# Patient Record
Sex: FEMALE | Race: WHITE | Employment: OTHER | URBAN - METROPOLITAN AREA
[De-identification: names, ages, dates, MRNs, and addresses within clinical notes are randomized per-mention and may not be internally consistent; named-entity substitution may affect disease eponyms.]

---

## 2017-06-13 ENCOUNTER — HOSPITAL ENCOUNTER (OUTPATIENT)
Dept: LAB | Age: 77
Discharge: HOME | End: 2017-06-13
Payer: MEDICARE

## 2017-06-13 ENCOUNTER — OFFICE VISIT (OUTPATIENT)
Dept: HEMATOLOGY | Age: 77
End: 2017-06-13

## 2017-06-13 VITALS
TEMPERATURE: 96.8 F | OXYGEN SATURATION: 97 % | RESPIRATION RATE: 16 BRPM | HEIGHT: 62 IN | HEART RATE: 65 BPM | SYSTOLIC BLOOD PRESSURE: 141 MMHG | BODY MASS INDEX: 29.55 KG/M2 | WEIGHT: 160.6 LBS | DIASTOLIC BLOOD PRESSURE: 65 MMHG

## 2017-06-13 DIAGNOSIS — K74.60 CIRRHOSIS OF LIVER WITHOUT ASCITES, UNSPECIFIED HEPATIC CIRRHOSIS TYPE (HCC): ICD-10-CM

## 2017-06-13 DIAGNOSIS — K75.81 NASH (NONALCOHOLIC STEATOHEPATITIS): ICD-10-CM

## 2017-06-13 DIAGNOSIS — K74.60 CIRRHOSIS OF LIVER WITHOUT ASCITES, UNSPECIFIED HEPATIC CIRRHOSIS TYPE (HCC): Primary | ICD-10-CM

## 2017-06-13 LAB
ALBUMIN SERPL BCP-MCNC: 4.2 G/DL (ref 3.4–5)
ALBUMIN/GLOB SERPL: 1.3 {RATIO} (ref 0.8–1.7)
ALP SERPL-CCNC: 44 U/L (ref 45–117)
ALT SERPL-CCNC: 39 U/L (ref 13–56)
ANION GAP BLD CALC-SCNC: 10 MMOL/L (ref 3–18)
AST SERPL W P-5'-P-CCNC: 49 U/L (ref 15–37)
BASOPHILS # BLD AUTO: 0 K/UL (ref 0–0.06)
BASOPHILS # BLD: 0 % (ref 0–2)
BILIRUB DIRECT SERPL-MCNC: 0.1 MG/DL (ref 0–0.2)
BILIRUB SERPL-MCNC: 0.4 MG/DL (ref 0.2–1)
BUN SERPL-MCNC: 29 MG/DL (ref 7–18)
BUN/CREAT SERPL: 33 (ref 12–20)
CALCIUM SERPL-MCNC: 9.6 MG/DL (ref 8.5–10.1)
CHLORIDE SERPL-SCNC: 104 MMOL/L (ref 100–108)
CO2 SERPL-SCNC: 29 MMOL/L (ref 21–32)
CREAT SERPL-MCNC: 0.89 MG/DL (ref 0.6–1.3)
DIFFERENTIAL METHOD BLD: ABNORMAL
EOSINOPHIL # BLD: 0.1 K/UL (ref 0–0.4)
EOSINOPHIL NFR BLD: 2 % (ref 0–5)
ERYTHROCYTE [DISTWIDTH] IN BLOOD BY AUTOMATED COUNT: 15.5 % (ref 11.6–14.5)
GLOBULIN SER CALC-MCNC: 3.2 G/DL (ref 2–4)
GLUCOSE SERPL-MCNC: 166 MG/DL (ref 74–99)
HCT VFR BLD AUTO: 41.6 % (ref 35–45)
HGB BLD-MCNC: 13.8 G/DL (ref 12–16)
LYMPHOCYTES # BLD AUTO: 28 % (ref 21–52)
LYMPHOCYTES # BLD: 1.1 K/UL (ref 0.9–3.6)
MCH RBC QN AUTO: 30.4 PG (ref 24–34)
MCHC RBC AUTO-ENTMCNC: 33.2 G/DL (ref 31–37)
MCV RBC AUTO: 91.6 FL (ref 74–97)
MONOCYTES # BLD: 0.3 K/UL (ref 0.05–1.2)
MONOCYTES NFR BLD AUTO: 7 % (ref 3–10)
NEUTS SEG # BLD: 2.5 K/UL (ref 1.8–8)
NEUTS SEG NFR BLD AUTO: 63 % (ref 40–73)
PLATELET # BLD AUTO: 108 K/UL (ref 135–420)
PMV BLD AUTO: 12.5 FL (ref 9.2–11.8)
POTASSIUM SERPL-SCNC: 4.3 MMOL/L (ref 3.5–5.5)
PROT SERPL-MCNC: 7.4 G/DL (ref 6.4–8.2)
RBC # BLD AUTO: 4.54 M/UL (ref 4.2–5.3)
SODIUM SERPL-SCNC: 143 MMOL/L (ref 136–145)
WBC # BLD AUTO: 4 K/UL (ref 4.6–13.2)

## 2017-06-13 PROCEDURE — 36415 COLL VENOUS BLD VENIPUNCTURE: CPT | Performed by: NURSE PRACTITIONER

## 2017-06-13 PROCEDURE — 82107 ALPHA-FETOPROTEIN L3: CPT | Performed by: NURSE PRACTITIONER

## 2017-06-13 PROCEDURE — 80076 HEPATIC FUNCTION PANEL: CPT | Performed by: NURSE PRACTITIONER

## 2017-06-13 PROCEDURE — 80048 BASIC METABOLIC PNL TOTAL CA: CPT | Performed by: NURSE PRACTITIONER

## 2017-06-13 PROCEDURE — 85025 COMPLETE CBC W/AUTO DIFF WBC: CPT | Performed by: NURSE PRACTITIONER

## 2017-06-13 RX ORDER — ATORVASTATIN CALCIUM 10 MG/1
TABLET, FILM COATED ORAL DAILY
COMMUNITY

## 2017-06-13 NOTE — PROGRESS NOTES
2 Alie Carlisle MD, GLENN Sorenson PA-C Emi Arbour, MD, FACP, [facility]  Melida Flores NP        at [address], [address] [address]  [address].     [phone]     FAX: [phone]    at [address], [address] [place] [address], [address]     [phone]     FAX: [phone]           Patient Care Team:  Geeta Adair MD as PCP - General (Family Practice)  Ana Blackman MD (Gastroenterology)      Problem List  Date Reviewed: 6/13/2017          Codes Class Noted    MÉNDEZ (nonalcoholic steatohepatitis) ICD-10-CM: K75.81  ICD-9-CM: 571.8  11/29/2014        Diabetes mellitus ([address] ICD-10-CM: E11.9  ICD-9-CM: 250.00  11/5/2013        Gout ICD-10-CM: M10.9  ICD-9-CM: 274.9  11/5/2013        GERD (gastroesophageal reflux disease) ICD-10-CM: K21.9  ICD-9-CM: 530.81  11/5/2013        Hypothyroidism ICD-10-CM: E03.9  ICD-9-CM: 244.9  11/5/2013        Hypertension ICD-10-CM: I10  ICD-9-CM: 401.9  11/5/2013        S/P cholecystectomy ICD-10-CM: Z90.49  ICD-9-CM: V45.79  11/5/2013        S/P EULALIA (total abdominal hysterectomy) ICD-10-CM: Z90.710  ICD-9-CM: V88.01  11/5/2013              Marya Ahuja returns to the [place] [address] for monitoring of MÉNDEZ. The active problem list, all pertinent past medical history, medications, liver histology, endoscopic studies, radiologic findings and laboratory findings related to the liver disorder were reviewed with the patient. Serologic evaluation was positive for JAEL and ASMA. A liver biopsy performed in 2005 suggested MÉNDEZ with fibrosis. She had been on prednisone for macrobid induced pulmonary fibrosis. While on prednisone the liver enzymes normalized. When off prednisone the liver enzymes increased. She had a very high titer positive JAEL and ASMA.   The possibility she had AIH and not MÉNDEZ was considered. Repeat liver biopsy in 8/2014 demonstrates MÉNDEZ with bridging fibrosis. The patient feels well and voiced no complaints today. She is helping to care for her grandchildren. She has been walking 4 to 6 miles a day and has lost 25 pounds over the past year or so. The patient has no complaints which can be attributed to liver disease. The patient completes all daily activities without any functional limitations. The patient has not experienced fatigue, fevers, chills, shortness of breath, chest pain, pain in the right side over the liver, diffuse abdominal pain, nausea, vomiting, constipation, diarrhrea, dry eyes, dry mouth, arthralgias, myalgias, yellowing of the eyes or skin, itching, dark urine, problems concentrating, swelling of the abdomen, swelling of the lower extremities, hematemesis, or hematochezia. Allergies   Allergen Reactions    Ciprofloxacin Itching     \"scalp burns and body itches\"    Macrobid [Nitrofurantoin Monohyd/M-Cryst] Other (comments)     Scarred lungs    Sulfa (Sulfonamide Antibiotics) Hives    Tetanus Immune Globulin Swelling     Current Outpatient Prescriptions   Medication Sig    atorvastatin (LIPITOR) 10 mg tablet Take  by mouth daily.  METHENAMINE HIPPURATE (HIPREX PO) Take 1 Tab by mouth two (2) times a day.  insulin lispro (HUMALOG) 100 unit/mL kwikpen by SubCUTAneous route Before breakfast, lunch, dinner and at bedtime. Sliding scale   Indications: TYPE 2 DIABETES MELLITUS    nitroglycerin (NITROSTAT) 0.4 mg SL tablet 0.4 mg by SubLINGual route every five (5) minutes as needed for Chest Pain.  furosemide (LASIX) 20 mg tablet Take 1 Tab by mouth daily.  aspirin delayed-release 81 mg tablet Take  by mouth daily.  allopurinol (ZYLOPRIM) 300 mg tablet Take  by mouth daily. Indications: GOUT    fenofibrate (TRICOR) 160 mg tablet Take 160 mg by mouth daily.  Indications: HYPERCHOLESTEROLEMIA    levothyroxine (SYNTHROID) 125 mcg tablet Take 125 mcg by mouth Daily (before breakfast). 125mcg Tuesday, W ednesday, Friday,Saturday, Sunday  62. 5mcg Monday, Thursday  Indications: HYPOTHYROIDISM    insulin detemir (LEVEMIR) 100 unit/mL (3 mL) pen 50 Units by SubCUTAneous route two (2) times a day. 15 units in the morning and 35 units at night. Indications: type 2 diabetes mellitus    omeprazole (PRILOSEC) 20 mg capsule Take 20 mg by mouth daily. Indications: GASTROESOPHAGEAL REFLUX     No current facility-administered medications for this visit. REVIEW OF SYSTEMS:  Constitution systems: Negative for fever, chills, weight gain, weight loss. Eyes: Negative for diplopia, visual changes, eye pain. ENT: Negative for sore throat, painful swallowing. Respiratory: Negative for cough, hemoptysis, dyspnea. Cardiology: Negative for chest pain, palpitations. GI:  Negative for constipation or diarrhea. : Negative for urinary frequency, dysuria and hematuria. Skin: Negative for rash. Hematology: Negative for easy bruising, bleeding from gums or nose. Musculo-skelatal: Negative for back pain, muscle pain, weakness. Neurologic: Negative for headaches, dizziness, vertigo, memory problems. Psychology: Negative for anxiety, depression. FAMILY/SOCIAL HISTORY:  The patient is . The patient has 3 children, and 10 grandchildren. The patient has never used tobacco products. The patient consumes 5 alcoholic beverages per week. The patient currently works full time as school  and worked in retail sales. 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 PHYSICAL EXAMINATION:    Visit Vitals    /65    Pulse 65    Temp 96.8 °F (36 °C) (Tympanic)    Resp 16    Ht 5' 2\" (1.575 m)    Wt 160 lb 9.6 oz (72.8 kg)    SpO2 97%    BMI 29.37 kg/m2       General: No acute distress. Eyes: Sclera anicteric. ENT: No oral lesions. Thyroid normal.  Nodes: No adenopathy. Skin: No spider angiomata.   No jaundice. No palmar erythema. Respiratory: Lungs clear to auscultation. Cardiovascular: Regular heart rate. No murmurs. No JVD. Abdomen: Soft non-tender. Liver size normal to percussion/palpation. Spleen not palpable. No obvious ascites. Extremities: No edema. No muscle wasting. No gross arthritic changes. Neurologic: Alert and oriented. Cranial nerves grossly intact. No asterixsis.     LABORATORY STUDIES:  [address] & Units 10/27/2016 4/27/2016   WBC 4.6 - 13.2 K/uL 4.7 3.9 (L)   ANC 1.8 - 8.0 K/UL 3.1 2.4   HGB 12.0 - 16.0 g/dL 13.5 13.0    - 420 K/uL 94 (L) 100 (L)   INR 0.8 - 1.2     AST 15 - 37 U/L 51 (H) 92 (H)   ALT 13 - 56 U/L 48 91 (H)   Alk Phos 45 - 117 U/L 49 66   Bili, Total 0.2 - 1.0 MG/DL 0.3 0.5   Bili, Direct 0.0 - 0.2 MG/DL 0.1 0.1   Albumin 3.4 - 5.0 g/dL 4.0 4.2   BUN 7.0 - 18 MG/DL 30 (H) 18   Creat 0.6 - 1.3 MG/DL 0.97 0.82   Na 136 - 145 mmol/L 145 143   K 3.5 - 5.5 mmol/L 4.5 4.4   Cl 100 - 108 mmol/L 105 104   CO2 21 - 32 mmol/L 30 30   Glucose 74 - 99 mg/dL 137 (H) 171 (H)     [facility] Latest Ref Rng & Units 7/24/2014   WBC 4.6 - 13.2 K/uL 5.2   ANC 1.8 - 8.0 K/UL 3.1   HGB 12.0 - 16.0 g/dL 13.8    - 420 K/uL 124 (L)   INR 0.8 - 1.2 1.0   AST 15 - 37 U/L 62 (H)   ALT 13 - 56 U/L 65 (H)   Alk Phos 45 - 117 U/L 74   Bili, Total 0.2 - 1.0 MG/DL 0.4   Bili, Direct 0.0 - 0.2 MG/DL 0.13   Albumin 3.4 - 5.0 g/dL 4.5   BUN 7.0 - 18 MG/DL 17   Creat 0.6 - 1.3 MG/DL 0.74   Na 136 - 145 mmol/L 139   K 3.5 - 5.5 mmol/L 4.6   Cl 100 - 108 mmol/L 100   CO2 21 - 32 mmol/L 24   Glucose 74 - 99 mg/dL 218 (H)       SEROLOGIES:  Serologies Latest Ref [facility] 11/5/2013   Hep B Surface Ag Negative Negative   Hep B Core Ab, Total Negative Negative   Ferritin 15 - 150 ng/mL 213 (H)   Iron % Saturation 15 - 55 % 14 (L)   JAEL, IFA  See patterns   JAEL Pattern  1:1280 (H)   ASMCA 0 - 19 Units 35 (H)   Alpha-1 antitrypsin level 90 - 200 mg/dL 176     11/2013. Anti-HBsurface negative, anti-HCV negative. LIVER HISTOLOGY:  2005. MÉNDEZ with fibrosis. 8/2014. Slides reviewed by MLS. MÉNDEZ with bridging fibrosis. ENDOSCOPIC PROCEDURES:  Not available or performed    RADIOLOGY:  11/2013. Ultrasound of liver. Echogenic consistent with chronic liver disease. No liver mass lesions. No dilated bile ducts. No ascites. 10/2015: Ultrasound of liver. Echogenic consistent with chronic liver disease. No liver mass lesions. No dilated bile ducts. No ascites. 6/2016: Ultrasound of liver. Echogenic consistent with chronic liver disease. No liver mass lesions. No dilated bile ducts. No ascites. OTHER TESTING:  Not available or performed    ASSESSMENT AND PLAN:  MÉNDEZ with bridging fibrosis. The most recent laboratory studies indicate that the liver transaminases are elevated (AST>ALT), alkaline phosphatase is normal, tests of hepatic synthetic and metabolic function are normal, and the platelet count is depressed. Based on labs, the patient appears to have cirrhosis. Will perform laboratory testing to monitor liver function and degree of liver injury. This will include hepatic panel, a CBC w/ diff, a BMP, a PT/INR, and an AFP-L3%. The need to perform an assessment of liver fibrosis was discussed with the patient. Elastography  can assess liver fibrosis and determine if a patient has advanced fibrosis or cirrhosis without the need for liver biopsy. This can also be performed with ultrasound. This was ordered today. The patient was counseled regarding diet and exercise to achieve weight loss. The best diet for patients with fatty liver is one very low in carbohydrates and enriched with protein such as an [address] program.      The patient was directed to continue all current medications at the current dosages.   There are no contraindications for the patient to take any medications that are necessary for treatment of other medical issues including medications for diabetes mellitus and hypercholesterolemia. The patient was counseled regarding alcohol consumption and that this could contribute to fatty liver disease. Vaccination for viral hepatitis B is recommended since the patient has no serologic evidence of previous exposure or vaccination with immunity. The need for vaccination against viral hepatitis A will be assessed with serologic and instituted as appropriate. 25 minutes total time spent with this patient with more than 50% of this time spent counseling and coordinating care as described above. [facility] 87 in 4 months.     Debora Benavidez NP  [facility] of [address] [place] [address], [address] [place] [address] [facility], [address]  [phone]

## 2017-06-13 NOTE — MR AVS SNAPSHOT
Visit Information Date & Time Provider Department Dept. Phone Encounter #  
 6/13/2017  1:00 PM Darius Browning NP Via [address] [id number] Follow-up Instructions Return in about 4 months (around 10/13/2017). Upcoming Health Maintenance Date Due HEMOGLOBIN A1C Q6M 1940 LIPID PANEL Q1 1940 FOOT EXAM Q1 5/18/1952 MICROALBUMIN Q1 5/18/1952 EYE EXAM RETINAL OR DILATED Q1 5/18/1952 DTaP/Tdap/Td series (1 - Tdap) 5/18/1963 FOBT Q 1 YEAR AGE 50-75 5/18/1992 ZOSTER VACCINE AGE 60> 5/18/2002 GLAUCOMA SCREENING Q2Y 5/18/2007 OSTEOPOROSIS SCREENING (DEXA) 5/18/2007 Pneumococcal 65+ Low/Medium Risk (1 of 2 - PCV13) 5/18/2007 MEDICARE YEARLY EXAM 5/18/2007 INFLUENZA AGE 9 TO ADULT 8/1/2017 Allergies as of 6/13/2017  Review Complete On: 6/13/2017 By: Mustapha Hampton Severity Noted Reaction Type Reactions Ciprofloxacin  12/31/2013   Side Effect Itching \"scalp burns and body itches\" Burke Barger [Nitrofurantoin Monohyd/m-cryst]  12/03/2013    Other (comments) Scarred lungs Sulfa (Sulfonamide Antibiotics)  11/05/2013    Hives Tetanus Immune Globulin  11/05/2013    Swelling Current Immunizations  Never Reviewed No immunizations on file. Not reviewed this visit You Were Diagnosed With   
  
 Codes Comments Cirrhosis of liver without ascites, unspecified hepatic cirrhosis type ([facility]    -  Primary ICD-10-CM: K74.60 ICD-9-CM: 571.5 MÉNDEZ (nonalcoholic steatohepatitis)     ICD-10-CM: A12.70 ICD-9-CM: 571.8 Vitals BP Pulse Temp Resp Height(growth percentile) Weight(growth percentile) 141/65 65 96.8 °F (36 °C) (Tympanic) 16 5' 2\" (1.575 m) 160 lb 9.6 oz (72.8 kg) SpO2 BMI OB Status Smoking Status 97% 29.37 kg/m2 Hysterectomy Never Smoker BMI and BSA Data  Body Mass Index Body Surface Area  
 29.37 kg/m 2 1.78 m 2  
  
  
 Preferred Pharmacy Pharmacy Name Phone [facility] [address], [address] [place] [address] [phone] Your Updated Medication List  
  
   
This list is accurate as of: 6/13/17  1:39 PM.  Always use your most recent med list.  
  
  
  
  
 allopurinol 300 mg tablet Commonly known as:  Claribel Kim Take  by mouth daily. Indications: GOUT  
  
 aspirin delayed-release 81 mg tablet Take  by mouth daily. atorvastatin 10 mg tablet Commonly known as:  LIPITOR Take  by mouth daily. fenofibrate 160 mg tablet Commonly known as:  LOFIBRA Take 160 mg by mouth daily. Indications: HYPERCHOLESTEROLEMIA  
  
 furosemide 20 mg tablet Commonly known as:  LASIX Take 1 Tab by mouth daily. HIPREX PO Take 1 Tab by mouth two (2) times a day. insulin detemir 100 unit/mL (3 mL) Inpn Commonly known as:  LEVEMIR FLEXTOUCH  
50 Units by SubCUTAneous route two (2) times a day. 15 units in the morning and 35 units at night. Indications: type 2 diabetes mellitus  
  
 insulin lispro 100 unit/mL kwikpen Commonly known as:  HUMALOG  
by SubCUTAneous route Before breakfast, lunch, dinner and at bedtime. Sliding scale   Indications: TYPE 2 DIABETES MELLITUS  
  
 levothyroxine 125 mcg tablet Commonly known as:  SYNTHROID Take 125 mcg by mouth Daily (before breakfast). 125mcg Tuesday, W ednesday, Friday,Saturday, Sunday 62. 5mcg Monday, Thursday  Indications: HYPOTHYROIDISM  
  
 nitroglycerin 0.4 mg SL tablet Commonly known as:  NITROSTAT  
0.4 mg by SubLINGual route every five (5) minutes as needed for Chest Pain. omeprazole 20 mg capsule Commonly known as:  PRILOSEC Take 20 mg by mouth daily. Indications: GASTROESOPHAGEAL REFLUX Follow-up Instructions Return in about 4 months (around 10/13/2017). To-Do List   
 06/13/2017 Lab:  AFP WITH AFP-L3%   
  
 06/13/2017 Lab:  CBC WITH AUTOMATED DIFF   
  
 06/13/2017 Lab:  HEPATIC FUNCTION PANEL   
  
 06/13/2017 Lab:  METABOLIC PANEL, BASIC   
  
 06/13/2017 Lab:  PROTHROMBIN TIME + INR   
  
 06/13/2017 Imaging:  US ABD LTD W ELASTOGRAPHY Introducing hospitals & HEALTH SERVICES! Dear Jennifer Sanders: Thank you for requesting a [employer] account. Our records indicate that you already have an active [employer] account. You can access your account anytime at [email or web address] Did you know that you can access your hospital and ER discharge instructions at any time in [employer]? You can also review all of your test results from your hospital stay or ER visit. Additional Information If you have questions, please visit the Frequently Asked Questions section of the Exper[employer] website at [email or web address] Remember, [employer] is NOT to be used for urgent needs. For medical emergencies, dial 911. Now available from your iPhone and Android! Please provide this summary of care documentation to your next provider. Your primary care clinician is listed as Maggy Kuo. If you have any questions after today's visit, please call [phone].

## 2017-06-14 LAB
AFP L3 MFR SERPL: 12.7 % (ref 0–9.9)
AFP SERPL-MCNC: 2.6 NG/ML (ref 0–8)

## 2017-06-23 ENCOUNTER — HOSPITAL ENCOUNTER (OUTPATIENT)
Dept: ULTRASOUND IMAGING | Age: 77
Discharge: HOME | End: 2017-06-23
Payer: MEDICARE

## 2017-06-23 DIAGNOSIS — K74.60 CIRRHOSIS OF LIVER WITHOUT ASCITES, UNSPECIFIED HEPATIC CIRRHOSIS TYPE (HCC): ICD-10-CM

## 2017-06-23 PROCEDURE — 0346T US ABD LTD W ELASTOGRAPHY: CPT

## 2017-06-26 NOTE — PROGRESS NOTES
Called pt to inform her that her U/S results are now posted in [place]. Pt stated she was at doctors appt with her  and she would look at results at a later time.

## 2017-06-26 NOTE — PROGRESS NOTES
Please let her know that the ultrasound results are released in \"my chart\". The elastography suggests mild to moderated hepatic fibrosis, F2. Also has some fatty liver.   Thank you

## 2017-10-12 ENCOUNTER — OFFICE VISIT (OUTPATIENT)
Dept: HEMATOLOGY | Age: 77
End: 2017-10-12

## 2017-10-12 ENCOUNTER — HOSPITAL ENCOUNTER (OUTPATIENT)
Dept: LAB | Age: 77
Discharge: HOME | End: 2017-10-12
Payer: MEDICARE

## 2017-10-12 VITALS
HEART RATE: 65 BPM | TEMPERATURE: 97 F | OXYGEN SATURATION: 97 % | DIASTOLIC BLOOD PRESSURE: 75 MMHG | SYSTOLIC BLOOD PRESSURE: 150 MMHG | BODY MASS INDEX: 28.63 KG/M2 | RESPIRATION RATE: 16 BRPM | HEIGHT: 62 IN | WEIGHT: 155.6 LBS

## 2017-10-12 DIAGNOSIS — K74.60 CIRRHOSIS OF LIVER WITHOUT ASCITES, UNSPECIFIED HEPATIC CIRRHOSIS TYPE (HCC): Primary | ICD-10-CM

## 2017-10-12 DIAGNOSIS — K74.60 CIRRHOSIS OF LIVER WITHOUT ASCITES, UNSPECIFIED HEPATIC CIRRHOSIS TYPE (HCC): ICD-10-CM

## 2017-10-12 LAB
ALBUMIN SERPL-MCNC: 4 G/DL (ref 3.4–5)
ALBUMIN/GLOB SERPL: 1.3 {RATIO} (ref 0.8–1.7)
ALP SERPL-CCNC: 44 U/L (ref 45–117)
ALT SERPL-CCNC: 32 U/L (ref 13–56)
ANION GAP SERPL CALC-SCNC: 7 MMOL/L (ref 3–18)
AST SERPL-CCNC: 33 U/L (ref 15–37)
BASOPHILS # BLD: 0 K/UL (ref 0–0.06)
BASOPHILS NFR BLD: 0 % (ref 0–2)
BILIRUB DIRECT SERPL-MCNC: 0.1 MG/DL (ref 0–0.2)
BILIRUB SERPL-MCNC: 0.3 MG/DL (ref 0.2–1)
BUN SERPL-MCNC: 22 MG/DL (ref 7–18)
BUN/CREAT SERPL: 28 (ref 12–20)
CALCIUM SERPL-MCNC: 9.3 MG/DL (ref 8.5–10.1)
CHLORIDE SERPL-SCNC: 105 MMOL/L (ref 100–108)
CO2 SERPL-SCNC: 31 MMOL/L (ref 21–32)
CREAT SERPL-MCNC: 0.8 MG/DL (ref 0.6–1.3)
DIFFERENTIAL METHOD BLD: ABNORMAL
EOSINOPHIL # BLD: 0.1 K/UL (ref 0–0.4)
EOSINOPHIL NFR BLD: 2 % (ref 0–5)
ERYTHROCYTE [DISTWIDTH] IN BLOOD BY AUTOMATED COUNT: 14.8 % (ref 11.6–14.5)
GLOBULIN SER CALC-MCNC: 3.2 G/DL (ref 2–4)
GLUCOSE SERPL-MCNC: 87 MG/DL (ref 74–99)
HCT VFR BLD AUTO: 41.3 % (ref 35–45)
HGB BLD-MCNC: 13.6 G/DL (ref 12–16)
INR PPP: 1.1 (ref 0.8–1.2)
LYMPHOCYTES # BLD: 1 K/UL (ref 0.9–3.6)
LYMPHOCYTES NFR BLD: 28 % (ref 21–52)
MCH RBC QN AUTO: 30.3 PG (ref 24–34)
MCHC RBC AUTO-ENTMCNC: 32.9 G/DL (ref 31–37)
MCV RBC AUTO: 92 FL (ref 74–97)
MONOCYTES # BLD: 0.3 K/UL (ref 0.05–1.2)
MONOCYTES NFR BLD: 8 % (ref 3–10)
NEUTS SEG # BLD: 2.1 K/UL (ref 1.8–8)
NEUTS SEG NFR BLD: 62 % (ref 40–73)
PLATELET # BLD AUTO: 104 K/UL (ref 135–420)
PMV BLD AUTO: 12.9 FL (ref 9.2–11.8)
POTASSIUM SERPL-SCNC: 4.3 MMOL/L (ref 3.5–5.5)
PROT SERPL-MCNC: 7.2 G/DL (ref 6.4–8.2)
PROTHROMBIN TIME: 13.4 SEC (ref 11.5–15.2)
RBC # BLD AUTO: 4.49 M/UL (ref 4.2–5.3)
SODIUM SERPL-SCNC: 143 MMOL/L (ref 136–145)
WBC # BLD AUTO: 3.5 K/UL (ref 4.6–13.2)

## 2017-10-12 PROCEDURE — 80076 HEPATIC FUNCTION PANEL: CPT | Performed by: NURSE PRACTITIONER

## 2017-10-12 PROCEDURE — 82107 ALPHA-FETOPROTEIN L3: CPT | Performed by: NURSE PRACTITIONER

## 2017-10-12 PROCEDURE — 36415 COLL VENOUS BLD VENIPUNCTURE: CPT | Performed by: NURSE PRACTITIONER

## 2017-10-12 PROCEDURE — 85610 PROTHROMBIN TIME: CPT | Performed by: NURSE PRACTITIONER

## 2017-10-12 PROCEDURE — 80048 BASIC METABOLIC PNL TOTAL CA: CPT | Performed by: NURSE PRACTITIONER

## 2017-10-12 PROCEDURE — 85025 COMPLETE CBC W/AUTO DIFF WBC: CPT | Performed by: NURSE PRACTITIONER

## 2017-10-12 NOTE — PROGRESS NOTES
[address] Nabil Manning MD, [address], [place], [place]       GLENN Jackson PA-C Louetta Peace, [facility]   GLENN Butler NP        at [facility] AT [address], [address] [facility]     [address] [facility], [facility].     [phone]     FAX: [phone]    at [address], [id number] [place] [address], [address]     [phone]     FAX: [phone]         Patient Care Team:  Elizabeth Huynh MD as PCP - General (Family Practice)  Vianca Frazier MD (Gastroenterology)      Problem List  Date Reviewed: 6/13/2017          Codes Class Noted    MÉNDEZ (nonalcoholic steatohepatitis) ICD-10-CM: K75.81  ICD-9-CM: 571.8  11/29/2014        Diabetes mellitus ([address] ICD-10-CM: E11.9  ICD-9-CM: 250.00  11/5/2013        Gout ICD-10-CM: M10.9  ICD-9-CM: 274.9  11/5/2013        GERD (gastroesophageal reflux disease) ICD-10-CM: K21.9  ICD-9-CM: 530.81  11/5/2013        Hypothyroidism ICD-10-CM: E03.9  ICD-9-CM: 244.9  11/5/2013        Hypertension ICD-10-CM: I10  ICD-9-CM: 401.9  11/5/2013        S/P cholecystectomy ICD-10-CM: Z90.49  ICD-9-CM: V45.79  11/5/2013        S/P EULALIA (total abdominal hysterectomy) ICD-10-CM: Z90.710  ICD-9-CM: V88.01  11/5/2013              [facility] MÉNDEZ. The active problem list, all pertinent past medical history, medications, liver histology, endoscopic studies, radiologic findings and laboratory findings related to the liver disorder were reviewed with the patient. Serologic evaluation was positive for JAEL and ASMA. A liver biopsy performed in 2005 suggested MÉNDEZ with fibrosis. She had been on prednisone for macrobid induced pulmonary fibrosis. While on prednisone the liver enzymes normalized. When off prednisone the liver enzymes increased. She had a very high titer positive JAEL and ASMA. The possibility she had AIH and not MÉNDEZ was considered. Repeat liver biopsy in 8/2014 demonstrates MÉNDEZ with bridging fibrosis. The patient feels well and voiced no complaints today. She is helping to care for her grandchildren. She has been walking 4 to 6 miles a day and has lost 25 pounds over the past year or so. The patient has no complaints which can be attributed to liver disease. The patient completes all daily activities without any functional limitations. The patient has not experienced fatigue, fevers, chills, shortness of breath, chest pain, pain in the right side over the liver, diffuse abdominal pain, nausea, vomiting, constipation, diarrhrea, dry eyes, dry mouth, arthralgias, myalgias, yellowing of the eyes or skin, itching, dark urine, problems concentrating, swelling of the abdomen, swelling of the lower extremities, hematemesis, or hematochezia. Allergies   Allergen Reactions    Ciprofloxacin Itching     \"scalp burns and body itches\"    Macrobid [Nitrofurantoin Monohyd/M-Cryst] Other (comments)     Scarred lungs    Sulfa (Sulfonamide Antibiotics) Hives    Tetanus Immune Globulin Swelling     Current Outpatient Prescriptions   Medication Sig    atorvastatin (LIPITOR) 10 mg tablet Take  by mouth daily.  METHENAMINE HIPPURATE (HIPREX PO) Take 1 Tab by mouth two (2) times a day.  insulin lispro (HUMALOG) 100 unit/mL kwikpen by SubCUTAneous route Before breakfast, lunch, dinner and at bedtime. Sliding scale   Indications: TYPE 2 DIABETES MELLITUS    nitroglycerin (NITROSTAT) 0.4 mg SL tablet 0.4 mg by SubLINGual route every five (5) minutes as needed for Chest Pain.  furosemide (LASIX) 20 mg tablet Take 1 Tab by mouth daily.  aspirin delayed-release 81 mg tablet Take  by mouth daily.  allopurinol (ZYLOPRIM) 300 mg tablet Take  by mouth daily. Indications: GOUT    fenofibrate (TRICOR) 160 mg tablet Take 160 mg by mouth daily.  Indications: HYPERCHOLESTEROLEMIA  levothyroxine (SYNTHROID) 125 mcg tablet Take 125 mcg by mouth Daily (before breakfast). 125mcg Tuesday, W ednesday, Friday,Saturday, Sunday  62. 5mcg Monday, Thursday  Indications: HYPOTHYROIDISM    insulin detemir (LEVEMIR) 100 unit/mL (3 mL) pen 50 Units by SubCUTAneous route two (2) times a day. 15 units in the morning and 35 units at night. Indications: type 2 diabetes mellitus    omeprazole (PRILOSEC) 20 mg capsule Take 20 mg by mouth daily. Indications: GASTROESOPHAGEAL REFLUX     No current facility-administered medications for this visit. REVIEW OF SYSTEMS:  Constitution systems: Negative for fever, chills, weight gain, weight loss. Eyes: Negative for diplopia, visual changes, eye pain. ENT: Negative for sore throat, painful swallowing. Respiratory: Negative for cough, hemoptysis, dyspnea. Cardiology: Negative for chest pain, palpitations. GI:  Negative for constipation or diarrhea. : Negative for urinary frequency, dysuria and hematuria. Skin: Negative for rash. Hematology: Negative for easy bruising, bleeding from gums or nose. Musculo-skelatal: Negative for back pain, muscle pain, weakness. Neurologic: Negative for headaches, dizziness, vertigo, memory problems. Psychology: Negative for anxiety, depression. FAMILY/SOCIAL HISTORY:  The patient is . The patient has 3 children, and 10 grandchildren. The patient has never used tobacco products. The patient consumes 5 alcoholic beverages per week. 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 PHYSICAL EXAMINATION:    Visit Vitals    /75 (BP 1 Location: Right arm, BP Patient Position: Sitting)    Pulse 65    Temp 97 °F (36.1 °C) (Tympanic)    Resp 16    Ht 5' 2\" (1.575 m)    Wt 155 lb 9.6 oz (70.6 kg)    SpO2 97%    BMI 28.46 kg/m2       General: No acute distress. Eyes: Sclera anicteric. ENT: No oral lesions. Thyroid normal.  Nodes: No adenopathy. Skin: No spider angiomata. No jaundice.   No palmar erythema. Respiratory: Lungs clear to auscultation. Cardiovascular: Regular heart rate. No murmurs. No JVD. Abdomen: Soft non-tender. Liver size normal to percussion/palpation. Spleen not palpable. No obvious ascites. Extremities: No edema. No muscle wasting. No gross arthritic changes. Neurologic: Alert and oriented. Cranial nerves grossly intact. No asterixsis.     LABORATORY STUDIES:  [address] & Units 6/13/2017 10/27/2016   WBC 4.6 - 13.2 K/uL 4.0 (L) 4.7   ANC 1.8 - 8.0 K/UL 2.5 3.1   HGB 12.0 - 16.0 g/dL 13.8 13.5    - 420 K/uL 108 (L) 94 (L)   INR 0.8 - 1.2     AST 15 - 37 U/L 49 (H) 51 (H)   ALT 13 - 56 U/L 39 48   Alk Phos 45 - 117 U/L 44 (L) 49   Bili, Total 0.2 - 1.0 MG/DL 0.4 0.3   Bili, Direct 0.0 - 0.2 MG/DL 0.1 0.1   Albumin 3.4 - 5.0 g/dL 4.2 4.0   BUN 7.0 - 18 MG/DL 29 (H) 30 (H)   Creat 0.6 - 1.3 MG/DL 0.89 0.97   Na 136 - 145 mmol/L 143 145   K 3.5 - 5.5 mmol/L 4.3 4.5   Cl 100 - 108 mmol/L 104 105   CO2 21 - 32 mmol/L 29 30   Glucose 74 - 99 mg/dL 166 (H) 137 (H)     Liver [facility] Latest Ref Rng & Units 4/27/2016   WBC 4.6 - 13.2 K/uL 3.9 (L)   ANC 1.8 - 8.0 K/UL 2.4   HGB 12.0 - 16.0 g/dL 13.0    - 420 K/uL 100 (L)   INR 0.8 - 1.2    AST 15 - 37 U/L 92 (H)   ALT 13 - 56 U/L 91 (H)   Alk Phos 45 - 117 U/L 66   Bili, Total 0.2 - 1.0 MG/DL 0.5   Bili, Direct 0.0 - 0.2 MG/DL 0.1   Albumin 3.4 - 5.0 g/dL 4.2   BUN 7.0 - 18 MG/DL 18   Creat 0.6 - 1.3 MG/DL 0.82   Na 136 - 145 mmol/L 143   K 3.5 - 5.5 mmol/L 4.4   Cl 100 - 108 mmol/L 104   CO2 21 - 32 mmol/L 30   Glucose 74 - 99 mg/dL 171 (H)     SEROLOGIES:  Serologies Latest Ref [facility] 11/5/2013   Hep B Surface Ag Negative Negative   Hep B Core Ab, Total Negative Negative   Ferritin 15 - 150 ng/mL 213 (H)   Iron % Saturation 15 - 55 % 14 (L)   JAEL, IFA  See patterns   JAEL Pattern  1:1280 (H)   ASMCA 0 - 19 Units 35 (H)   Alpha-1 antitrypsin level 90 - 200 mg/dL 176 11/2013. Anti-HBsurface negative, anti-HCV negative. LIVER HISTOLOGY:  2005. MÉNDEZ with fibrosis. 8/2014. Slides reviewed by MLS. MÉNDEZ with bridging fibrosis. 06/2017. TRANSIENT HEPATIC ELASTOGRAPHY:   E Range: 7.3-11.5 kPa  E Mean: 8.9 kPa  E Median: 8.7 kPa  E Std: 1.5 kPa    ENDOSCOPIC PROCEDURES:  Not available or performed    RADIOLOGY:  11/2013. Ultrasound of liver. Echogenic consistent with chronic liver disease. No liver mass lesions. No dilated bile ducts. No ascites. 10/2015: Ultrasound of liver. Echogenic consistent with chronic liver disease. No liver mass lesions. No dilated bile ducts. No ascites. 6/2016: Ultrasound of liver. Echogenic consistent with chronic liver disease. No liver mass lesions. No dilated bile ducts. No ascites. 06/2017. Ultrasound of the liver, performed with elastography. Heterogeneously coarsened parenchyma with suggestion of a subtle micronodular contour. No solid mass lesion. OTHER TESTING:  Not available or performed    ASSESSMENT AND PLAN:  MÉNDEZ with bridging fibrosis. The most recent laboratory studies indicate that the liver transaminases are elevated (AST>ALT), alkaline phosphatase is normal, tests of hepatic synthetic and metabolic function are normal, and the platelet count is depressed. Based on labs, the patient appears to have cirrhosis. Will perform laboratory testing to monitor liver function and degree of liver injury. This will include hepatic panel, a CBC w/ diff, a BMP, a PT/INR, and an AFP-L3%. Shear wave elastography suggests portal fibrosis, F2. The patient's lab work suggests cirrhosis. She is thrombocytopenic and her AST is greater than her ALT. Complications of cirrhosis were discussed in detail. We discussed thrombocytopenia, portal hypertension, varices, GI bleeding, peripheral edema, ascites, hepatic encephalopathy, and hepatocellular carcinoma.  We discussed the need for follow ups on a regular basis to monitor for complications. We discussed the need for every 6 month liver imaging studies. The safest way to proceed is to perform ultrasounds for [address] screenings every 6 months. Ultrasound was ordered today to be performed in 12/2017. I have asked her to call 's office to arrange for EGD screening for portal hypertension and esophageal varices. The patient was counseled regarding diet and exercise to achieve weight loss. The best diet for patients with fatty liver is one very low in carbohydrates and enriched with protein such as an [address] program.      The patient was directed to continue all current medications at the current dosages. There are no contraindications for the patient to take any medications that are necessary for treatment of other medical issues including medications for diabetes mellitus and hypercholesterolemia. The patient was counseled regarding alcohol consumption and that this could contribute to fatty liver disease. Vaccination for viral hepatitis B is recommended since the patient has no serologic evidence of previous exposure or vaccination with immunity. The need for vaccination against viral hepatitis A will be assessed with serologic and instituted as appropriate. 25 minutes total time spent with this patient with more than 50% of this time spent counseling and coordinating care as described above. [facility] 87 in 6 months.     Roya Taylor NP  [facility] [address], [address] [place] [address] Alie Em, [address] [phone]

## 2017-10-12 NOTE — MR AVS SNAPSHOT
Visit Information Date & Time Provider Department Dept. Phone Encounter #  
 10/12/2017 10:00 AM GLENN Albertmelony [address] of  [address] [id number] Follow-up Instructions Return in about 6 months (around 4/12/2018). Upcoming Health Maintenance Date Due HEMOGLOBIN A1C Q6M 1940 LIPID PANEL Q1 1940 FOOT EXAM Q1 5/18/1952 MICROALBUMIN Q1 5/18/1952 EYE EXAM RETINAL OR DILATED Q1 5/18/1952 DTaP/Tdap/Td series (1 - Tdap) 5/18/1963 ZOSTER VACCINE AGE 60> 3/18/2002 GLAUCOMA SCREENING Q2Y 5/18/2007 OSTEOPOROSIS SCREENING (DEXA) 5/18/2007 Pneumococcal 65+ Low/Medium Risk (1 of 2 - PCV13) 5/18/2007 MEDICARE YEARLY EXAM 5/18/2007 INFLUENZA AGE 9 TO ADULT 8/1/2017 Allergies as of 10/12/2017  Review Complete On: 10/12/2017 By: Jason Wong Severity Noted Reaction Type Reactions Ciprofloxacin  12/31/2013   Side Effect Itching \"scalp burns and body itches\" Crissie Best [Nitrofurantoin Monohyd/m-cryst]  12/03/2013    Other (comments) Scarred lungs Sulfa (Sulfonamide Antibiotics)  11/05/2013    Hives Tetanus Immune Globulin  11/05/2013    Swelling Current Immunizations  Never Reviewed No immunizations on file. Not reviewed this visit You Were Diagnosed With   
  
 Codes Comments Cirrhosis of liver without ascites, unspecified hepatic cirrhosis type ([address]    -  Primary ICD-10-CM: K74.60 ICD-9-CM: 571.5 Vitals BP Pulse Temp Resp Height(growth percentile) Weight(growth percentile) 150/75 (BP 1 Location: Right arm, BP Patient Position: Sitting) 65 97 °F (36.1 °C) (Tympanic) 16 5' 2\" (1.575 m) 155 lb 9.6 oz (70.6 kg) SpO2 BMI OB Status Smoking Status 97% 28.46 kg/m2 Hysterectomy Never Smoker BMI and BSA Data Body Mass Index Body Surface Area  
 28.46 kg/m 2 1.76 m 2 Preferred Pharmacy Pharmacy Name Phone [address] [phone] Your Updated Medication List  
  
   
This list is accurate as of: 10/12/17 10:47 AM.  Always use your most recent med list.  
  
  
  
  
 allopurinol 300 mg tablet Commonly known as:  Nadia Andrade Take  by mouth daily. Indications: GOUT  
  
 aspirin delayed-release 81 mg tablet Take  by mouth daily. atorvastatin 10 mg tablet Commonly known as:  LIPITOR Take  by mouth daily. fenofibrate 160 mg tablet Commonly known as:  LOFIBRA Take 160 mg by mouth daily. Indications: HYPERCHOLESTEROLEMIA  
  
 furosemide 20 mg tablet Commonly known as:  LASIX Take 1 Tab by mouth daily. HIPREX PO Take 1 Tab by mouth two (2) times a day. insulin detemir 100 unit/mL (3 mL) Inpn Commonly known as:  LEVEMIR FLEXTOUCH  
50 Units by SubCUTAneous route two (2) times a day. 15 units in the morning and 35 units at night. Indications: type 2 diabetes mellitus  
  
 insulin lispro 100 unit/mL kwikpen Commonly known as:  HUMALOG  
by SubCUTAneous route Before breakfast, lunch, dinner and at bedtime. Sliding scale   Indications: TYPE 2 DIABETES MELLITUS  
  
 levothyroxine 125 mcg tablet Commonly known as:  SYNTHROID Take 125 mcg by mouth Daily (before breakfast). 125mcg Tuesday, W ednesday, Friday,Saturday, Sunday 62. 5mcg Monday, Thursday  Indications: HYPOTHYROIDISM  
  
 nitroglycerin 0.4 mg SL tablet Commonly known as:  NITROSTAT  
0.4 mg by SubLINGual route every five (5) minutes as needed for Chest Pain. omeprazole 20 mg capsule Commonly known as:  PRILOSEC Take 20 mg by mouth daily. Indications: GASTROESOPHAGEAL REFLUX Follow-up Instructions Return in about 6 months (around 4/12/2018). To-Do List   
 10/12/2017 Imaging:  [facility]! Dear Mamie Riddle: Thank you for requesting a [employer] account.   Our records indicate that you already have an active [employer] account. You can access your account anytime at [email or web address]. [email or web address] Did you know that you can access your hospital and ER discharge instructions at any time in [employer]? You can also review all of your test results from your hospital stay or ER visit. Additional Information If you have questions, please visit the Frequently Asked Questions section of the [employer] website at [email or web address] Remember, [employer] is NOT to be used for urgent needs. For medical emergencies, dial 911. Now available from your iPhone and Android! Please provide this summary of care documentation to your next provider. Your primary care clinician is listed as Patricio Salgado. If you have any questions after today's visit, please call [phone].

## 2017-10-13 LAB
AFP L3 MFR SERPL: NORMAL % (ref 0–9.9)
AFP SERPL-MCNC: 1.5 NG/ML (ref 0–8)

## 2018-03-09 PROBLEM — N81.10 FEMALE CYSTOCELE: Status: ACTIVE | Noted: 2018-03-09

## 2018-04-16 ENCOUNTER — OFFICE VISIT (OUTPATIENT)
Dept: HEMATOLOGY | Age: 78
End: 2018-04-16

## 2018-04-16 ENCOUNTER — HOSPITAL ENCOUNTER (OUTPATIENT)
Dept: LAB | Age: 78
Discharge: HOME | End: 2018-04-16
Payer: MEDICARE

## 2018-04-16 VITALS
HEIGHT: 62 IN | SYSTOLIC BLOOD PRESSURE: 182 MMHG | BODY MASS INDEX: 30.36 KG/M2 | OXYGEN SATURATION: 97 % | WEIGHT: 165 LBS | RESPIRATION RATE: 18 BRPM | TEMPERATURE: 97.7 F | HEART RATE: 70 BPM | DIASTOLIC BLOOD PRESSURE: 76 MMHG

## 2018-04-16 DIAGNOSIS — K74.60 CIRRHOSIS OF LIVER WITHOUT ASCITES, UNSPECIFIED HEPATIC CIRRHOSIS TYPE (HCC): Primary | ICD-10-CM

## 2018-04-16 DIAGNOSIS — K74.60 CIRRHOSIS OF LIVER WITHOUT ASCITES, UNSPECIFIED HEPATIC CIRRHOSIS TYPE (HCC): ICD-10-CM

## 2018-04-16 LAB
ALBUMIN SERPL-MCNC: 3.6 G/DL (ref 3.4–5)
ALBUMIN/GLOB SERPL: 1.1 {RATIO} (ref 0.8–1.7)
ALP SERPL-CCNC: 49 U/L (ref 45–117)
ALT SERPL-CCNC: 33 U/L (ref 13–56)
ANION GAP SERPL CALC-SCNC: 8 MMOL/L (ref 3–18)
AST SERPL-CCNC: 30 U/L (ref 15–37)
BASOPHILS # BLD: 0 K/UL (ref 0–0.06)
BASOPHILS NFR BLD: 0 % (ref 0–2)
BILIRUB DIRECT SERPL-MCNC: 0.1 MG/DL (ref 0–0.2)
BILIRUB SERPL-MCNC: 0.3 MG/DL (ref 0.2–1)
BUN SERPL-MCNC: 23 MG/DL (ref 7–18)
BUN/CREAT SERPL: 27 (ref 12–20)
CALCIUM SERPL-MCNC: 8.9 MG/DL (ref 8.5–10.1)
CHLORIDE SERPL-SCNC: 108 MMOL/L (ref 100–108)
CO2 SERPL-SCNC: 29 MMOL/L (ref 21–32)
CREAT SERPL-MCNC: 0.84 MG/DL (ref 0.6–1.3)
DIFFERENTIAL METHOD BLD: ABNORMAL
EOSINOPHIL # BLD: 0.1 K/UL (ref 0–0.4)
EOSINOPHIL NFR BLD: 3 % (ref 0–5)
ERYTHROCYTE [DISTWIDTH] IN BLOOD BY AUTOMATED COUNT: 15.1 % (ref 11.6–14.5)
GLOBULIN SER CALC-MCNC: 3.4 G/DL (ref 2–4)
GLUCOSE SERPL-MCNC: 165 MG/DL (ref 74–99)
HCT VFR BLD AUTO: 39.2 % (ref 35–45)
HGB BLD-MCNC: 12.4 G/DL (ref 12–16)
INR PPP: 1.1 (ref 0.8–1.2)
LYMPHOCYTES # BLD: 0.9 K/UL (ref 0.9–3.6)
LYMPHOCYTES NFR BLD: 26 % (ref 21–52)
MCH RBC QN AUTO: 28.6 PG (ref 24–34)
MCHC RBC AUTO-ENTMCNC: 31.6 G/DL (ref 31–37)
MCV RBC AUTO: 90.5 FL (ref 74–97)
MONOCYTES # BLD: 0.2 K/UL (ref 0.05–1.2)
MONOCYTES NFR BLD: 7 % (ref 3–10)
NEUTS SEG # BLD: 2.1 K/UL (ref 1.8–8)
NEUTS SEG NFR BLD: 64 % (ref 40–73)
PLATELET # BLD AUTO: 88 K/UL (ref 135–420)
PMV BLD AUTO: 12 FL (ref 9.2–11.8)
POTASSIUM SERPL-SCNC: 4.5 MMOL/L (ref 3.5–5.5)
PROT SERPL-MCNC: 7 G/DL (ref 6.4–8.2)
PROTHROMBIN TIME: 13.7 SEC (ref 11.5–15.2)
RBC # BLD AUTO: 4.33 M/UL (ref 4.2–5.3)
SODIUM SERPL-SCNC: 145 MMOL/L (ref 136–145)
WBC # BLD AUTO: 3.3 K/UL (ref 4.6–13.2)

## 2018-04-16 PROCEDURE — 82107 ALPHA-FETOPROTEIN L3: CPT | Performed by: NURSE PRACTITIONER

## 2018-04-16 PROCEDURE — 85610 PROTHROMBIN TIME: CPT | Performed by: NURSE PRACTITIONER

## 2018-04-16 PROCEDURE — 80076 HEPATIC FUNCTION PANEL: CPT | Performed by: NURSE PRACTITIONER

## 2018-04-16 PROCEDURE — 85025 COMPLETE CBC W/AUTO DIFF WBC: CPT | Performed by: NURSE PRACTITIONER

## 2018-04-16 PROCEDURE — 80048 BASIC METABOLIC PNL TOTAL CA: CPT | Performed by: NURSE PRACTITIONER

## 2018-04-16 PROCEDURE — 36415 COLL VENOUS BLD VENIPUNCTURE: CPT | Performed by: NURSE PRACTITIONER

## 2018-04-16 NOTE — PROGRESS NOTES
Neena Mayers is a 78 y.o. female    No chief complaint on file. 1. Have you been to the ER, urgent care clinic or hospitalized since your last visit? YES    Patient was hospitalized in [address] [facility] on 29MAR2018. 2. Have you seen or consulted any other health care providers outside of the [address] [place] [address] since your last visit (Include any pap smears or colon screening)? YES    Patient saw urologist since last visit for surgery prep, surgery, and post surgery visit.       Learning Assessment 2/26/2014   PRIMARY LEARNER Patient   HIGHEST LEVEL OF EDUCATION - PRIMARY LEARNER  SOME COLLEGE   BARRIERS PRIMARY LEARNER NONE   CO-LEARNER CAREGIVER No   PRIMARY LANGUAGE ENGLISH   LEARNER PREFERENCE PRIMARY DEMONSTRATION   LEARNING SPECIAL TOPICS no    ANSWERED BY patient   RELATIONSHIP SELF

## 2018-04-16 NOTE — PROGRESS NOTES
[address] Manning MD, Reynold Gomez, Lisa Vijaya Dawson, [place]       Emelia Jackson, PERLA Vasquez, [facility]   GLENN Mc NP        at [address], [facility] Ole Goldberg  22.     [phone]     FAX: [phone]    at [address], [address]     [phone]     FAX: [phone]         Patient Care Team:  Shoaib Christian MD as PCP - General (Family Practice)  Nolberto Kimball MD (Gastroenterology)      Problem List  Date Reviewed: 4/13/2018          Codes Class Noted    Female cystocele ICD-10-CM: N81.10  ICD-9-CM: 618.01  3/9/2018        MÉNDEZ (nonalcoholic steatohepatitis) ICD-10-CM: K75.81  ICD-9-CM: 571.8  11/29/2014        Diabetes mellitus ([facility] [address] ICD-10-CM: E11.9  ICD-9-CM: 250.00  11/5/2013        Gout ICD-10-CM: M10.9  ICD-9-CM: 274.9  11/5/2013        GERD (gastroesophageal reflux disease) ICD-10-CM: K21.9  ICD-9-CM: 530.81  11/5/2013        Hypothyroidism ICD-10-CM: E03.9  ICD-9-CM: 244.9  11/5/2013        Hypertension ICD-10-CM: I10  ICD-9-CM: 401.9  11/5/2013        S/P cholecystectomy ICD-10-CM: Z90.49  ICD-9-CM: V45.79  11/5/2013        S/P EULALIA (total abdominal hysterectomy) ICD-10-CM: Z90.710  ICD-9-CM: V88.01  11/5/2013              Bethel Coldamon returns to the [address] for monitoring of MÉNDEZ. The active problem list, all pertinent past medical history, medications, liver histology, endoscopic studies, radiologic findings and laboratory findings related to the liver disorder were reviewed with the patient. Serologic evaluation was positive for JAEL and ASMA. A liver biopsy performed in 2005 suggested MÉNDEZ with fibrosis. She had been on prednisone for macrobid induced pulmonary fibrosis. While on prednisone the liver enzymes normalized.   When off prednisone the liver enzymes increased. She had a very high titer positive JAEL and ASMA. The possibility she had AIH and not MÉNDEZ was considered. Repeat liver biopsy in 8/2014 demonstrates MÉNDEZ with bridging fibrosis. The patient recently has surgical repair of prolapsed vaginal mathias by Dr. Bob Miller. The patient feels well and voiced no complaints today. She is helping to care for her grandchildren. She has been walking 4 to 6 miles a day and has lost 25 pounds over the past year or so. The patient has no complaints which can be attributed to liver disease. The patient completes all daily activities without any functional limitations. The patient has not experienced fatigue, fevers, chills, shortness of breath, chest pain, pain in the right side over the liver, diffuse abdominal pain, nausea, vomiting, constipation, diarrhrea, dry eyes, dry mouth, arthralgias, myalgias, yellowing of the eyes or skin, itching, dark urine, problems concentrating, swelling of the abdomen, swelling of the lower extremities, hematemesis, or hematochezia. Allergies   Allergen Reactions    Ciprofloxacin Itching     \"scalp burns and body itches\"    Macrobid [Nitrofurantoin Monohyd/M-Cryst] Other (comments)     Scarred lungs    Sulfa (Sulfonamide Antibiotics) Hives    Tetanus Immune Globulin Swelling     Current Outpatient Prescriptions   Medication Sig    metoprolol succinate (TOPROL-XL) 100 mg tablet Take  by mouth daily.  methenamine hippurate (HIPREX) 1 gram tablet Take 1 Tab by mouth two (2) times daily (with meals).  atorvastatin (LIPITOR) 10 mg tablet Take  by mouth daily.  insulin lispro (HUMALOG) 100 unit/mL kwikpen by SubCUTAneous route Before breakfast, lunch, dinner and at bedtime. Sliding scale   Indications: TYPE 2 DIABETES MELLITUS    furosemide (LASIX) 20 mg tablet Take 1 Tab by mouth daily.  allopurinol (ZYLOPRIM) 300 mg tablet Take  by mouth daily.  Indications: GOUT    fenofibrate (TRICOR) 160 mg tablet Take 160 mg by mouth daily. Indications: HYPERCHOLESTEROLEMIA    levothyroxine (SYNTHROID) 125 mcg tablet Take 125 mcg by mouth Daily (before breakfast). 125mcg Tuesday, W ednesday, Friday,Saturday, Sunday  62. 5mcg Monday, Thursday  Indications: HYPOTHYROIDISM    insulin detemir (LEVEMIR) 100 unit/mL (3 mL) pen 50 Units by SubCUTAneous route two (2) times a day. 15 units in the morning and 35 units at night. Indications: type 2 diabetes mellitus    omeprazole (PRILOSEC) 20 mg capsule Take 20 mg by mouth daily. Indications: GASTROESOPHAGEAL REFLUX    nitroglycerin (NITROSTAT) 0.4 mg SL tablet 0.4 mg by SubLINGual route every five (5) minutes as needed for Chest Pain. No current facility-administered medications for this visit. REVIEW OF SYSTEMS:  Constitution systems: Negative for fever, chills, weight gain, weight loss. Eyes: Negative for diplopia, visual changes, eye pain. ENT: Negative for sore throat, painful swallowing. Respiratory: Negative for cough, hemoptysis, dyspnea. Cardiology: Negative for chest pain, palpitations. GI:  Negative for constipation or diarrhea. : Negative for urinary frequency, dysuria and hematuria. Skin: Negative for rash. Hematology: Negative for easy bruising, bleeding from gums or nose. Musculo-skelatal: Negative for back pain, muscle pain, weakness. Neurologic: Negative for headaches, dizziness, vertigo, memory problems. Psychology: Negative for anxiety, depression. FAMILY/SOCIAL HISTORY:  The patient is . The patient has 3 children, and 10 grandchildren. The patient has never used tobacco products. The patient consumes 5 alcoholic beverages per week. 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       PHYSICAL EXAMINATION:  Visit Vitals    /76 (BP 1 Location: Right arm, BP Patient Position: Sitting)    Pulse 70    Temp 97.7 °F (36.5 °C) (Tympanic)    Resp 18    Ht 5' 2\" (1.575 m)    Wt 165 lb (74.8 kg)    SpO2 97%    BMI 30.18 kg/m2       General: No acute distress. Eyes: Sclera anicteric. ENT: No oral lesions. Thyroid normal.  Nodes: No adenopathy. Skin: No spider angiomata. No jaundice. No palmar erythema. Respiratory: Lungs clear to auscultation. Cardiovascular: Regular heart rate. No murmurs. No JVD. Abdomen: Soft non-tender. Liver size normal to percussion/palpation. Spleen not palpable. No obvious ascites. Extremities: No edema. No muscle wasting. No gross arthritic changes. Neurologic: Alert and oriented. Cranial nerves grossly intact. No asterixsis. LABORATORY STUDIES:  Liver [place] of [address] [place] [address] & Units 10/12/2017 6/13/2017   WBC 4.6 - 13.2 K/uL 3.5 (L) 4.0 (L)   ANC 1.8 - 8.0 K/UL 2.1 2.5   HGB 12.0 - 16.0 g/dL 13.6 13.8    - 420 K/uL 104 (L) 108 (L)   INR 0.8 - 1.2   1.1    AST 15 - 37 U/L 33 49 (H)   ALT 13 - 56 U/L 32 39   Alk Phos 45 - 117 U/L 44 (L) 44 (L)   Bili, Total 0.2 - 1.0 MG/DL 0.3 0.4   Bili, Direct 0.0 - 0.2 MG/DL 0.1 0.1   Albumin 3.4 - 5.0 g/dL 4.0 4.2   BUN 7.0 - 18 MG/DL 22 (H) 29 (H)   Creat 0.6 - 1.3 MG/DL 0.80 0.89   Na 136 - 145 mmol/L 143 143   K 3.5 - 5.5 mmol/L 4.3 4.3   Cl 100 - 108 mmol/L 105 104   CO2 21 - 32 mmol/L 31 29   Glucose 74 - 99 mg/dL 87 166 (H)     Cancer Screening Latest Ref Rng & Units 10/12/2017 6/13/2017   AFP, Serum 0.0 - 8.0 ng/mL 1.5 2.6   AFP-L3% 0.0 - 9.9 % Comment 12.7 (H)     SEROLOGIES:  Serologies Latest Ref Rng 11/5/2013   Hep B Surface Ag Negative Negative   Hep B Core Ab, Total Negative Negative   Ferritin 15 - 150 ng/mL 213 (H)   Iron % Saturation 15 - 55 % 14 (L)   JAEL, IFA  See patterns   JAEL Pattern  1:1280 (H)   ASMCA 0 - 19 Units 35 (H)   Alpha-1 antitrypsin level 90 - 200 mg/dL 176     11/2013. Anti-HBsurface negative, anti-HCV negative. LIVER HISTOLOGY:  2005. MÉNDEZ with fibrosis. 8/2014. Slides reviewed by MLS. MÉNDEZ with bridging fibrosis. 06/2017.   TRANSIENT HEPATIC ELASTOGRAPHY: E Range: 7.3-11.5 kPa  E Mean: 8.9 kPa  E Median: 8.7 kPa  E Std: 1.5 kPa    ENDOSCOPIC PROCEDURES:  11/2017. EGD by Dr. Medora Cushing. Small varices. No banding. Report is unavailable in CC. RADIOLOGY:  11/2013. Ultrasound of liver. Echogenic consistent with chronic liver disease. No liver mass lesions. No dilated bile ducts. No ascites. 10/2015: Ultrasound of liver. Echogenic consistent with chronic liver disease. No liver mass lesions. No dilated bile ducts. No ascites. 6/2016: Ultrasound of liver. Echogenic consistent with chronic liver disease. No liver mass lesions. No dilated bile ducts. No ascites. 06/2017. Ultrasound of the liver, performed with elastography. Heterogeneously coarsened parenchyma with suggestion of a subtle micronodular contour. No solid mass lesion. 02/2018. Abdominal ultrasound. Features of fatty infiltration and cirrhosis in the liver unchanged. Hepatic cysts unchanged. OTHER TESTING:  Not available or performed    ASSESSMENT AND PLAN:  MÉNDEZ with bridging fibrosis. The most recent laboratory studies indicate that the liver transaminases are elevated, alkaline phosphatase is normal, tests of hepatic synthetic and metabolic function are normal, and the platelet count is depressed. Based on labs, the patient appears to have cirrhosis. Will perform laboratory testing to monitor liver function and degree of liver injury. This will include hepatic panel, a CBC w/ diff, a BMP, a PT/INR, and an AFP-L3%. Shear wave elastography suggests portal fibrosis, F2. The patient's lab work suggests cirrhosis. She is thrombocytopenic and her AST is greater than her ALT. Complications of cirrhosis were discussed in detail. We discussed thrombocytopenia, portal hypertension, varices, GI bleeding, peripheral edema, ascites, hepatic encephalopathy, and hepatocellular carcinoma.  We discussed the need for follow ups on a regular basis to monitor for complications. We discussed the need for every 6 month liver imaging studies. The safest way to proceed is to perform ultrasounds for [address] screenings every 6 months. Ultrasound was ordered today to be performed in   08/2018. This will be performed with shear wave elastography. Elastography  can assess liver fibrosis and determine if a patient has advanced fibrosis or cirrhosis without the need for liver biopsy. EGD was performed by Dr. Amanda Mejia in 11/2017. Follow up with him as indicated. The patient was counseled regarding diet and exercise to achieve weight loss. The best diet for patients with fatty liver is one very low in carbohydrates and enriched with protein such as an [address] program.      The patient was directed to continue all current medications at the current dosages. There are no contraindications for the patient to take any medications that are necessary for treatment of other medical issues including medications for diabetes mellitus and hypercholesterolemia. The patient was counseled regarding alcohol consumption and that this could contribute to fatty liver disease. Vaccination for viral hepatitis B is recommended since the patient has no serologic evidence of previous exposure or vaccination with immunity. The need for vaccination against viral hepatitis A will be assessed with serologic and instituted as appropriate. [facility] in 6 months.     Gustavo Ayala NP  Liver [place] of [address] [place] [address], [address] [place] [address] Alie Em, [address] [phone]

## 2018-04-16 NOTE — MR AVS SNAPSHOT
[address] [address] 
[phone] Patient: Dionicio Myaers MRN: [id number] Mary Jane Blanco Visit Information Date & Time Provider Department Dept. Phone Encounter #  
 4/16/2018 12:45 PM Prescott Ditto, NP [address] of  [address] [id number] Your Appointments 5/25/2018  3:00 PM  
ESTABLISHED PATIENT with Callie Leo MD  
[facility] ([facility]) Appt Note: Return in about 1 month (around 5/13/2018) for post op  exam. per [facility] [address], [place] [address] [facility]  
  
   
 [address], [facility] [phone]  
  
    
 10/16/2018 11:30 AM  
Follow [place] [address] [facility] ([address]) Appt Note: 6 MON F/U  
 [address] [facility], [address] [place] [address] Upcoming Health Maintenance Date Due HEMOGLOBIN A1C Q6M 1940 LIPID PANEL Q1 1940 FOOT EXAM Q1 5/18/1952 MICROALBUMIN Q1 5/18/1952 EYE EXAM RETINAL OR DILATED Q1 5/18/1952 DTaP/Tdap/Td series (1 - Tdap) 5/18/1963 ZOSTER VACCINE AGE 60> 3/18/2002 GLAUCOMA SCREENING Q2Y 5/18/2007 Bone Densitometry (Dexa) Screening 5/18/2007 Pneumococcal 65+ Low/Medium Risk (1 of 2 - PCV13) 5/18/2007 MEDICARE YEARLY EXAM 3/14/2018 Allergies as of 4/16/2018  Review Complete On: 4/16/2018 By: Esther Blake Severity Noted Reaction Type Reactions Ciprofloxacin  12/31/2013   Side Effect Itching \"scalp burns and body itches\" Ana Bring [Nitrofurantoin Monohyd/m-cryst]  12/03/2013    Other (comments) Scarred lungs Sulfa (Sulfonamide Antibiotics)  11/05/2013    Hives Tetanus Immune Globulin  11/05/2013    Swelling Current Immunizations  Never Reviewed No immunizations on file. Not reviewed this visit You Were Diagnosed With   
  
 Codes Comments Cirrhosis of liver without ascites, unspecified hepatic cirrhosis type ([facility]    -  Primary ICD-10-CM: K74.60 ICD-9-CM: 571.5 Vitals BP Pulse Temp Resp Height(growth percentile) 182/76 (BP 1 Location: Right arm, BP Patient Position: Sitting) 70 97.7 °F (36.5 °C) (Tympanic) 18 5' 2\" (1.575 m) Weight(growth percentile) SpO2 BMI OB Status Smoking Status 165 lb (74.8 kg) 97% 30.18 kg/m2 Hysterectomy Former Smoker Vitals History BMI and BSA Data Body Mass Index Body Surface Area  
 30.18 kg/m 2 1.81 m 2 Preferred Pharmacy Pharmacy Name Phone [address] [facility] [phone] Your Updated Medication List  
  
   
This list is accurate as of 4/16/18  1:15 PM.  Always use your most recent med list.  
  
  
  
  
 allopurinol 300 mg tablet Commonly known as:  Angel Snowman Take  by mouth daily. Indications: GOUT  
  
 atorvastatin 10 mg tablet Commonly known as:  LIPITOR Take  by mouth daily. fenofibrate 160 mg tablet Commonly known as:  LOFIBRA Take 160 mg by mouth daily. Indications: HYPERCHOLESTEROLEMIA  
  
 furosemide 20 mg tablet Commonly known as:  LASIX Take 1 Tab by mouth daily. insulin detemir U-100 100 unit/mL (3 mL) Inpn Commonly known as:  LEVEMIR FLEXTOUCH  
50 Units by SubCUTAneous route two (2) times a day. 15 units in the morning and 35 units at night. Indications: type 2 diabetes mellitus  
  
 insulin lispro 100 unit/mL kwikpen Commonly known as:  HUMALOG  
by SubCUTAneous route Before breakfast, lunch, dinner and at bedtime. Sliding scale   Indications: TYPE 2 DIABETES MELLITUS  
  
 levothyroxine 125 mcg tablet Commonly known as:  SYNTHROID Take 125 mcg by mouth Daily (before breakfast).  125mcg Tuesday, [address], Sunday 62. 5mcg Monday, Thursday  Indications: HYPOTHYROIDISM  
  
 methenamine hippurate 1 gram tablet Commonly known as:  HIPREX Take 1 Tab by mouth two (2) times daily (with meals). metoprolol succinate 100 mg tablet Commonly known as:  TOPROL-XL Take  by mouth daily. nitroglycerin 0.4 mg SL tablet Commonly known as:  NITROSTAT  
0.4 mg by SubLINGual route every five (5) minutes as needed for Chest Pain. omeprazole 20 mg capsule Commonly known as:  PRILOSEC Take 20 mg by mouth daily. Indications: GASTROESOPHAGEAL REFLUX To-Do List   
 04/16/2018 Lab:  AFP WITH AFP-L3% 04/16/2018 Lab:  CBC WITH AUTOMATED DIFF   
  
 04/16/2018 Lab:  HEPATIC FUNCTION PANEL   
  
 04/16/2018 Lab:  METABOLIC PANEL, BASIC   
  
 04/16/2018 Lab:  PROTHROMBIN TIME + INR   
  
 08/01/2018 Imaging:  US ABD LTD W ELASTOGRAPHY Introducing [facility]! Dear Compa Hardin: Thank you for requesting a [email or web address] account. Our records indicate that you already have an active [email or web address] account. You can access your account anytime at [email or web address] Did you know that you can access your hospital and ER discharge instructions at any time in [email or web address]? You can also review all of your test results from your hospital stay or ER visit. Additional Information If you have questions, please visit the Frequently Asked Questions section of the [email or web address] website at [email or web address] Remember, [email or web address] is NOT to be used for urgent needs. For medical emergencies, dial 911. Now available from your iPhone and Android! Please provide this summary of care documentation to your next provider. Your primary care clinician is listed as Jered Zhang. If you have any questions after today's visit, please call [phone].

## 2018-04-17 LAB
AFP L3 MFR SERPL: NORMAL % (ref 0–9.9)
AFP SERPL-MCNC: 2.1 NG/ML (ref 0–8)

## 2018-04-19 ENCOUNTER — TELEPHONE (OUTPATIENT)
Dept: HEMATOLOGY | Age: 78
End: 2018-04-19

## 2018-07-13 PROBLEM — N39.0 UTI (URINARY TRACT INFECTION): Status: ACTIVE | Noted: 2018-07-13

## 2018-07-31 ENCOUNTER — HOSPITAL ENCOUNTER (OUTPATIENT)
Dept: ULTRASOUND IMAGING | Age: 78
Discharge: HOME | End: 2018-07-31
Payer: MEDICARE

## 2018-07-31 DIAGNOSIS — K74.60 CIRRHOSIS OF LIVER WITHOUT ASCITES, UNSPECIFIED HEPATIC CIRRHOSIS TYPE (HCC): ICD-10-CM

## 2018-07-31 PROCEDURE — 0346T US ABD LTD W ELASTOGRAPHY: CPT

## 2018-08-01 NOTE — PROGRESS NOTES
Please let her know that her ultrasound is fine. No hepatic masses. Elastography suggests some fatty liver. Encourage weight loss. Fibrosis score of F1. Thank you.

## 2018-10-16 ENCOUNTER — OFFICE VISIT (OUTPATIENT)
Dept: HEMATOLOGY | Age: 78
End: 2018-10-16

## 2018-10-16 ENCOUNTER — HOSPITAL ENCOUNTER (OUTPATIENT)
Dept: LAB | Age: 78
Discharge: HOME | End: 2018-10-16
Payer: MEDICARE

## 2018-10-16 VITALS
TEMPERATURE: 96.1 F | DIASTOLIC BLOOD PRESSURE: 71 MMHG | SYSTOLIC BLOOD PRESSURE: 150 MMHG | HEIGHT: 62 IN | WEIGHT: 167 LBS | RESPIRATION RATE: 16 BRPM | OXYGEN SATURATION: 98 % | BODY MASS INDEX: 30.73 KG/M2 | HEART RATE: 61 BPM

## 2018-10-16 DIAGNOSIS — K74.60 CIRRHOSIS OF LIVER WITHOUT ASCITES, UNSPECIFIED HEPATIC CIRRHOSIS TYPE (HCC): Primary | ICD-10-CM

## 2018-10-16 DIAGNOSIS — K74.60 CIRRHOSIS OF LIVER WITHOUT ASCITES, UNSPECIFIED HEPATIC CIRRHOSIS TYPE (HCC): ICD-10-CM

## 2018-10-16 LAB
ALBUMIN SERPL-MCNC: 3.9 G/DL (ref 3.4–5)
ALBUMIN/GLOB SERPL: 1.1 {RATIO} (ref 0.8–1.7)
ALP SERPL-CCNC: 50 U/L (ref 45–117)
ALT SERPL-CCNC: 38 U/L (ref 13–56)
ANION GAP SERPL CALC-SCNC: 9 MMOL/L (ref 3–18)
AST SERPL-CCNC: 34 U/L (ref 15–37)
BASOPHILS # BLD: 0 K/UL (ref 0–0.1)
BASOPHILS NFR BLD: 0 % (ref 0–2)
BILIRUB DIRECT SERPL-MCNC: 0.1 MG/DL (ref 0–0.2)
BILIRUB SERPL-MCNC: 0.4 MG/DL (ref 0.2–1)
BUN SERPL-MCNC: 26 MG/DL (ref 7–18)
BUN/CREAT SERPL: 31 (ref 12–20)
CALCIUM SERPL-MCNC: 9.2 MG/DL (ref 8.5–10.1)
CHLORIDE SERPL-SCNC: 106 MMOL/L (ref 100–108)
CO2 SERPL-SCNC: 29 MMOL/L (ref 21–32)
CREAT SERPL-MCNC: 0.84 MG/DL (ref 0.6–1.3)
DIFFERENTIAL METHOD BLD: ABNORMAL
EOSINOPHIL # BLD: 0.1 K/UL (ref 0–0.4)
EOSINOPHIL NFR BLD: 2 % (ref 0–5)
ERYTHROCYTE [DISTWIDTH] IN BLOOD BY AUTOMATED COUNT: 16.1 % (ref 11.6–14.5)
GLOBULIN SER CALC-MCNC: 3.4 G/DL (ref 2–4)
GLUCOSE SERPL-MCNC: 127 MG/DL (ref 74–99)
HCT VFR BLD AUTO: 43.1 % (ref 35–45)
HGB BLD-MCNC: 13.8 G/DL (ref 12–16)
INR PPP: 1.1 (ref 0.8–1.2)
LYMPHOCYTES # BLD: 0.9 K/UL (ref 0.9–3.6)
LYMPHOCYTES NFR BLD: 30 % (ref 21–52)
MCH RBC QN AUTO: 29.2 PG (ref 24–34)
MCHC RBC AUTO-ENTMCNC: 32 G/DL (ref 31–37)
MCV RBC AUTO: 91.1 FL (ref 74–97)
MONOCYTES # BLD: 0.2 K/UL (ref 0.05–1.2)
MONOCYTES NFR BLD: 7 % (ref 3–10)
NEUTS SEG # BLD: 1.8 K/UL (ref 1.8–8)
NEUTS SEG NFR BLD: 61 % (ref 40–73)
PLATELET # BLD AUTO: 79 K/UL (ref 135–420)
PMV BLD AUTO: 12.5 FL (ref 9.2–11.8)
POTASSIUM SERPL-SCNC: 4.3 MMOL/L (ref 3.5–5.5)
PROT SERPL-MCNC: 7.3 G/DL (ref 6.4–8.2)
PROTHROMBIN TIME: 13.8 SEC (ref 11.5–15.2)
RBC # BLD AUTO: 4.73 M/UL (ref 4.2–5.3)
SODIUM SERPL-SCNC: 144 MMOL/L (ref 136–145)
WBC # BLD AUTO: 3 K/UL (ref 4.6–13.2)

## 2018-10-16 PROCEDURE — 85025 COMPLETE CBC W/AUTO DIFF WBC: CPT | Performed by: NURSE PRACTITIONER

## 2018-10-16 PROCEDURE — 80048 BASIC METABOLIC PNL TOTAL CA: CPT | Performed by: NURSE PRACTITIONER

## 2018-10-16 PROCEDURE — 36415 COLL VENOUS BLD VENIPUNCTURE: CPT | Performed by: NURSE PRACTITIONER

## 2018-10-16 PROCEDURE — 80076 HEPATIC FUNCTION PANEL: CPT | Performed by: NURSE PRACTITIONER

## 2018-10-16 PROCEDURE — 85610 PROTHROMBIN TIME: CPT | Performed by: NURSE PRACTITIONER

## 2018-10-16 PROCEDURE — 82107 ALPHA-FETOPROTEIN L3: CPT | Performed by: NURSE PRACTITIONER

## 2018-10-16 RX ORDER — LANOLIN ALCOHOL/MO/W.PET/CERES
1000 CREAM (GRAM) TOPICAL DAILY
COMMUNITY
End: 2020-07-29 | Stop reason: SDUPTHER

## 2018-10-16 NOTE — PROGRESS NOTES
[address] Manning MD, [place], [facility], [place] Vijaya Dawson, [place]       GLENN Marmolejo PA-C Edilia Rudd, REYNALDOP-BC   GLENN Najera NP        at [address], [address] [address]     [phone]     FAX: [phone]    at [address], [address]     [phone]     FAX: [phone]         Patient Care Team:  Charles Morse MD as PCP - General (Family Practice)  Jose Montilla MD (Gastroenterology)  Jillian Alfaro MD (Urology)  Karrie Gambino MD (Hematology and Oncology)  Emily Roa MD (Endocrinology)      Problem List  Date Reviewed: 10/16/2018          Codes Class Noted    UTI (urinary tract infection) ICD-10-CM: N39.0  ICD-9-CM: 599.0  7/13/2018        Female cystocele ICD-10-CM: N81.10  ICD-9-CM: 618.01  3/9/2018        MÉNDEZ (nonalcoholic steatohepatitis) ICD-10-CM: K75.81  ICD-9-CM: 571.8  11/29/2014        Diabetes mellitus ([facility] [address] ICD-10-CM: E11.9  ICD-9-CM: 250.00  11/5/2013        Gout ICD-10-CM: M10.9  ICD-9-CM: 274.9  11/5/2013        GERD (gastroesophageal reflux disease) ICD-10-CM: K21.9  ICD-9-CM: 530.81  11/5/2013        Hypothyroidism ICD-10-CM: E03.9  ICD-9-CM: 244.9  11/5/2013        Hypertension ICD-10-CM: I10  ICD-9-CM: 401.9  11/5/2013        S/P cholecystectomy ICD-10-CM: Z90.49  ICD-9-CM: V45.79  11/5/2013        S/P EULALIA (total abdominal hysterectomy) ICD-10-CM: Z90.710  ICD-9-CM: V88.01  11/5/2013              Unknown Lemon returns to the [place] [address] for monitoring of MÉNDEZ. The active problem list, all pertinent past medical history, medications, liver histology, endoscopic studies, radiologic findings and laboratory findings related to the liver disorder were reviewed with the patient. Serologic evaluation was positive for JAEL and ASMA.       A liver biopsy performed in 2005 suggested MÉNDEZ with fibrosis. She had been on prednisone for macrobid induced pulmonary fibrosis. While on prednisone the liver enzymes normalized. When off prednisone the liver enzymes increased. She had a very high titer positive JAEL and ASMA. The possibility she had AIH coexistent with MÉNDEZ was considered. Repeat liver biopsy in 8/2014 demonstrates MÉNDEZ with bridging fibrosis. The patient recently has surgical repair of prolapsed vaginal mathias by Dr. Teetee Viera. The patient feels well and voiced no complaints today. She is helping to care for her grandchildren. She has been walking 4 to 6 miles a day and has lost 25 pounds over the past year or so. Her highest weight was about 210 pounds. She weighs 167 pounds today. The patient has no complaints which can be attributed to liver disease. The patient completes all daily activities without any functional limitations. The patient has not experienced fatigue, fevers, chills, shortness of breath, chest pain, pain in the right side over the liver, diffuse abdominal pain, nausea, vomiting, constipation, diarrhrea, dry eyes, dry mouth, arthralgias, myalgias, yellowing of the eyes or skin, itching, dark urine, problems concentrating, swelling of the abdomen, swelling of the lower extremities, hematemesis, or hematochezia. Allergies   Allergen Reactions    Ciprofloxacin Itching     \"scalp burns and body itches\"    Macrobid [Nitrofurantoin Monohyd/M-Cryst] Other (comments)     Scarred lungs    Sulfa (Sulfonamide Antibiotics) Hives    Tetanus Immune Globulin Swelling     Current Outpatient Prescriptions   Medication Sig    cyanocobalamin 1,000 mcg tablet Take 1,000 mcg by mouth daily.  ferrous sulfate (IRON PO) Take  by mouth.  metoprolol succinate (TOPROL-XL) 100 mg tablet Take  by mouth daily.     methenamine hippurate (HIPREX) 1 gram tablet Take 1 Tab by mouth two (2) times daily (with meals).  atorvastatin (LIPITOR) 10 mg tablet Take  by mouth daily.  insulin lispro (HUMALOG) 100 unit/mL kwikpen by SubCUTAneous route Before breakfast, lunch, dinner and at bedtime. Sliding scale   Indications: TYPE 2 DIABETES MELLITUS    nitroglycerin (NITROSTAT) 0.4 mg SL tablet 0.4 mg by SubLINGual route every five (5) minutes as needed for Chest Pain.  allopurinol (ZYLOPRIM) 300 mg tablet Take  by mouth daily. Indications: GOUT    fenofibrate (TRICOR) 160 mg tablet Take 160 mg by mouth daily. Indications: HYPERCHOLESTEROLEMIA    levothyroxine (SYNTHROID) 125 mcg tablet Take 125 mcg by mouth Daily (before breakfast). 125mcg Tuesday, W ednesday, Friday,Saturday, Sunday  62. 5mcg Monday, Thursday  Indications: HYPOTHYROIDISM    insulin detemir (LEVEMIR) 100 unit/mL (3 mL) pen 50 Units by SubCUTAneous route two (2) times a day. 15 units in the morning and 35 units at night. Indications: type 2 diabetes mellitus    omeprazole (PRILOSEC) 20 mg capsule Take 20 mg by mouth daily. Indications: GASTROESOPHAGEAL REFLUX     No current facility-administered medications for this visit. REVIEW OF SYSTEMS:  Constitution systems: Negative for fever, chills, weight gain, weight loss. Eyes: Negative for diplopia, visual changes, eye pain. ENT: Negative for sore throat, painful swallowing. Respiratory: Negative for cough, hemoptysis, dyspnea. Cardiology: Negative for chest pain, palpitations. GI:  Negative for constipation or diarrhea. : Negative for urinary frequency, dysuria and hematuria. Skin: Negative for rash. Hematology: Negative for easy bruising, bleeding from gums or nose. Musculo-skelatal: Negative for back pain, muscle pain, weakness. Neurologic: Negative for headaches, dizziness, vertigo, memory problems. Psychology: Negative for anxiety, depression. FAMILY/SOCIAL HISTORY:  The patient is . The patient has 3 children, and 10 grandchildren.   The patient has never used tobacco products. The patient consumes 5 alcoholic beverages per week. 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 PHYSICAL EXAMINATION:  Visit Vitals    /71 (BP 1 Location: Left arm, BP Patient Position: Sitting)    Pulse 61    Temp 96.1 °F (35.6 °C) (Tympanic)    Resp 16    Ht 5' 2\" (1.575 m)    Wt 167 lb (75.8 kg)    SpO2 98%    BMI 30.54 kg/m2       General: No acute distress. Eyes: Sclera anicteric. ENT: No oral lesions. Thyroid normal.  Nodes: No adenopathy. Skin: No spider angiomata. No jaundice. No palmar erythema. Respiratory: Lungs clear to auscultation. Cardiovascular: Regular heart rate. No murmurs. No JVD. Abdomen: Soft non-tender. Liver size normal to percussion/palpation. Spleen not palpable. No obvious ascites. Extremities: No edema. No muscle wasting. No gross arthritic changes. Neurologic: Alert and oriented. Cranial nerves grossly intact. No asterixsis.     LABORATORY STUDIES:  Liver [place] of [address] [place] [address] 10/16/2018 4/16/2018   WBC 4.6 - 13.2 K/uL 3.0 (L) 3.3 (L)   ANC 1.8 - 8.0 K/UL 1.8 2.1   HGB 12.0 - 16.0 g/dL 13.8 12.4    - 420 K/uL 79 (L) 88 (L)   INR 0.8 - 1.2   1.1 1.1   AST 15 - 37 U/L 34 30   ALT 13 - 56 U/L 38 33   Alk Phos 45 - 117 U/L 50 49   Bili, Total 0.2 - 1.0 MG/DL 0.4 0.3   Bili, Direct 0.0 - 0.2 MG/DL 0.1 0.1   Albumin 3.4 - 5.0 g/dL 3.9 3.6   BUN 7.0 - 18 MG/DL 26 (H) 23 (H)   Creat 0.6 - 1.3 MG/DL 0.84 0.84   Na 136 - 145 mmol/L 144 145   K 3.5 - 5.5 mmol/L 4.3 4.5   Cl 100 - 108 mmol/L 106 108   CO2 21 - 32 mmol/L 29 29   Glucose 74 - 99 mg/dL 127 (H) 165 (H)     Cancer Screening Latest Ref Rng & Units 10/16/2018 4/16/2018 10/12/2017   AFP, Serum 0.0 - 8.0 ng/mL 2.4 2.1 1.5   AFP-L3% 0.0 - 9.9 % Comment Comment Comment     SEROLOGIES:  Serologies Latest Ref Rng 11/5/2013   Hep B Surface Ag Negative Negative   Hep B Core Ab, Total Negative Negative   Ferritin 15 - 150 ng/mL 213 (H)   Iron % Saturation 15 - 55 % 14 (L)   JAEL, IFA  See patterns   JAEL Pattern  1:1280 (H)   ASMCA 0 - 19 Units 35 (H)   Alpha-1 antitrypsin level 90 - 200 mg/dL 176     11/2013. Anti-HBsurface negative, anti-HCV negative. LIVER HISTOLOGY:  2005. MÉNDEZ with fibrosis. 8/2014. Slides reviewed by MLS. MÉNDEZ with bridging fibrosis. 06/2017. TRANSIENT HEPATIC ELASTOGRAPHY:   E Range: 7.3-11.5 kPa  E Mean: 8.9 kPa  E Median: 8.7 kPa  E Std: 1.5 kPa    07/2018. TRANSIENT HEPATIC ELASTOGRAPHY:   E Range: 4.91-11.28 kPa  E Mean: 7.38 kPa  E Median: 7.17 kPa  E Std: 1.8 kPa    ENDOSCOPIC PROCEDURES:  11/2017. EGD by Dr. Ginger Will. Small varices. No banding. Report is unavailable in CC. RADIOLOGY:  11/2013. Ultrasound of liver. Echogenic consistent with chronic liver disease. No liver mass lesions. No dilated bile ducts. No ascites. 10/2015: Ultrasound of liver. Echogenic consistent with chronic liver disease. No liver mass lesions. No dilated bile ducts. No ascites. 6/2016: Ultrasound of liver. Echogenic consistent with chronic liver disease. No liver mass lesions. No dilated bile ducts. No ascites. 06/2017. Ultrasound of the liver, performed with elastography. Heterogeneously coarsened parenchyma with suggestion of a subtle micronodular contour. No solid mass lesion. 02/2018. Abdominal ultrasound. Features of fatty infiltration and cirrhosis in the liver unchanged. Hepatic cysts unchanged. 07/2018. Ultrasound of the liver. Heterogeneous and slightly coarse in echogenicity. There is incidental small anechoic 1.7 cm cyst in the left hepatic lobe. No evidence of solid mass. OTHER TESTING:  Not available or performed    ASSESSMENT AND PLAN:  MÉNDEZ with bridging fibrosis. The most recent laboratory studies indicate that the liver transaminases are elevated, alkaline phosphatase is normal, tests of hepatic synthetic and metabolic function are normal, and the platelet count is depressed.  Based on labs, the patient appears to have cirrhosis. Will perform laboratory testing to monitor liver function and degree of liver injury. This will include hepatic panel, a CBC w/ diff, a BMP, a PT/INR, and an AFP-L3%. Shear wave elastography suggests portal fibrosis, F2. Complications of cirrhosis were discussed in detail. We discussed thrombocytopenia, portal hypertension, varices, GI bleeding, peripheral edema, ascites, hepatic encephalopathy, and hepatocellular carcinoma. We discussed the need for follow ups on a regular basis to monitor for complications. We discussed the need for every 6 month liver imaging studies. The safest way to proceed is to perform ultrasounds for [facility]. screenings every 6 months. Ultrasound was ordered today to be performed in   01/2019. Shear wave elastography will be performed annually. Elastography can assess liver fibrosis and determine if a patient has advanced fibrosis or cirrhosis without the need for liver biopsy. EGD was performed by Dr. Idania Navarro in 11/2017. Follow up with him as indicated. The patient was counseled regarding diet and exercise to achieve weight loss. The best diet for patients with fatty liver is one very low in carbohydrates and enriched with protein such as an [address] program.      The patient was directed to continue all current medications at the current dosages. There are no contraindications for the patient to take any medications that are necessary for treatment of other medical issues including medications for diabetes mellitus and hypercholesterolemia. The patient was counseled regarding alcohol consumption and that this could contribute to fatty liver disease. Vaccination for viral hepatitis B is recommended since the patient has no serologic evidence of previous exposure or vaccination with immunity. The need for vaccination against viral hepatitis A will be assessed with serologic and instituted as appropriate.      Follow-up [address] in 6 months.     Gris Burr NP  Liver [place] of [address], [address] [facility], [facility] [address] [phone]

## 2018-10-16 NOTE — MR AVS SNAPSHOT
[address] [place] [address] 
[phone] Patient: Inge Weaver MRN: [id number] Adan Brockraman Visit Information Date & Time Provider Department Dept. Phone Encounter #  
 10/16/2018 11:30 AM GLENN Silveraudrey [address] of  [address] [id number] Your Appointments 11/14/2018  2:15 PM  
ESTABLISHED PATIENT with Yovana Briggs MD  
[facility] Appt Note: Return in about 3 months (around 11/30/2018) for repeat  exam. per Venecia Arnold; Letter mailed r/s 11/30/18  
 [employer] [address] [place] [address] [phone]  
[phone]  
  
   
 [employer] [address] [place] [id number] [facility] Upcoming Health Maintenance Date Due HEMOGLOBIN A1C Q6M 1940 LIPID PANEL Q1 1940 FOOT EXAM Q1 5/18/1952 MICROALBUMIN Q1 5/18/1952 EYE EXAM RETINAL OR DILATED Q1 5/18/1952 DTaP/Tdap/Td series (1 - Tdap) 5/18/1963 Shingrix Vaccine Age 50> (1 of 2) 5/18/1992 GLAUCOMA SCREENING Q2Y 5/18/2007 Bone Densitometry (Dexa) Screening 5/18/2007 Pneumococcal 65+ Low/Medium Risk (1 of 2 - PCV13) 1/1/2008 MEDICARE YEARLY EXAM 3/14/2018 Influenza Age 5 to Adult 8/1/2018 Allergies as of 10/16/2018  Review Complete On: 10/16/2018 By: Fortino Goldsmith Severity Noted Reaction Type Reactions Ciprofloxacin  12/31/2013   Side Effect Itching \"scalp burns and body itches\" [employer] [Nitrofurantoin Monohyd/m-cryst]  12/03/2013    Other (comments) Scarred lungs Sulfa (Sulfonamide Antibiotics)  11/05/2013    Hives Tetanus Immune Globulin  11/05/2013    Swelling Current Immunizations  Never Reviewed No immunizations on file. Not reviewed this visit You Were Diagnosed With   
  
 Codes Comments  Cirrhosis of liver without ascites, unspecified hepatic cirrhosis type [facility])    -  Primary ICD-10-CM: P32.47 ICD-9-CM: 571.5 Vitals BP Pulse Temp Resp Height(growth percentile) 150/71 (BP 1 Location: Left arm, BP Patient Position: Sitting) 61 96.1 °F (35.6 °C) (Tympanic) 16 5' 2\" (1.575 m) Weight(growth percentile) SpO2 BMI OB Status Smoking Status 167 lb (75.8 kg) 98% 30.54 kg/m2 Hysterectomy Former Smoker Vitals History BMI and BSA Data Body Mass Index Body Surface Area 30.54 kg/m 2 1.82 m 2 Preferred Pharmacy Pharmacy Name Phone [facility] [phone] Your Updated Medication List  
  
   
This list is accurate as of 10/16/18 11:40 AM.  Always use your most recent med list.  
  
  
  
  
 allopurinol 300 mg tablet Commonly known as:  Sola Ormond Take  by mouth daily. Indications: GOUT  
  
 atorvastatin 10 mg tablet Commonly known as:  LIPITOR Take  by mouth daily. cyanocobalamin 1,000 mcg tablet Take 1,000 mcg by mouth daily. fenofibrate 160 mg tablet Commonly known as:  LOFIBRA Take 160 mg by mouth daily. Indications: HYPERCHOLESTEROLEMIA  
  
 insulin detemir U-100 100 unit/mL (3 mL) Inpn Commonly known as:  LEVEMIR FLEXTOUCH  
50 Units by SubCUTAneous route two (2) times a day. 15 units in the morning and 35 units at night. Indications: type 2 diabetes mellitus  
  
 insulin lispro 100 unit/mL kwikpen Commonly known as:  HUMALOG  
by SubCUTAneous route Before breakfast, lunch, dinner and at bedtime. Sliding scale   Indications: TYPE 2 DIABETES MELLITUS  
  
 IRON PO Take  by mouth.  
  
 levothyroxine 125 mcg tablet Commonly known as:  SYNTHROID Take 125 mcg by mouth Daily (before breakfast). 125mcg Tuesday, W ednesday, Friday,Saturday, Sunday 62. 5mcg Monday, Thursday  Indications: HYPOTHYROIDISM  
  
 methenamine hippurate 1 gram tablet Commonly known as:  HIPREX Take 1 Tab by mouth two (2) times daily (with meals). metoprolol succinate 100 mg tablet Commonly known as:  TOPROL-XL Take  by mouth daily. nitroglycerin 0.4 mg SL tablet Commonly known as:  NITROSTAT  
0.4 mg by SubLINGual route every five (5) minutes as needed for Chest Pain. omeprazole 20 mg capsule Commonly known as:  PRILOSEC Take 20 mg by mouth daily. Indications: GASTROESOPHAGEAL REFLUX To-Do List   
 10/16/2018 Lab:  AFP WITH AFP-L3% 10/16/2018 Lab:  CBC WITH AUTOMATED DIFF   
  
 10/16/2018 Lab:  HEPATIC FUNCTION PANEL   
  
 10/16/2018 Lab:  METABOLIC PANEL, BASIC   
  
 10/16/2018 Lab:  PROTHROMBIN TIME + INR   
  
 02/01/2019 Imaging:  [facility] SERVICES! Dear Abraham Salazar: Thank you for requesting a [employer] account. Our records indicate that you already have an active [employer] account. You can access your account anytime at [email or web address]. [email or web address] Did you know that you can access your hospital and ER discharge instructions at any time in [employer]? You can also review all of your test results from your hospital stay or ER visit. Additional Information If you have questions, please visit the Frequently Asked Questions section of the [employer] website at [email or web address] Remember, [employer] is NOT to be used for urgent needs. For medical emergencies, dial 911. Now available from your iPhone and Android! Please provide this summary of care documentation to your next provider. Your primary care clinician is listed as Jayda Lisa. If you have any questions after today's visit, please call [phone].

## 2018-10-16 NOTE — PATIENT INSTRUCTIONS
Liver Disease Diet: Care Instructions  Your Care Instructions    The liver does many jobs that are vital to the rest of your body. When something is wrong with the liver, your body may not get the nutrition it needs. It is important that you eat a healthy diet that includes a variety of foods from the basic food groups: grains, vegetables, fruits, dairy, and protein foods. Follow your doctor's instructions for eating carbohydrate, protein, and fat in the right amounts for you. Your doctor also may limit salt or take salt out of your diet to help protect the liver. Always talk with your doctor or dietitian before you make changes in your diet. Follow-up care is a key part of your treatment and safety. Be sure to make and go to all appointments, and call your doctor if you are having problems. It's also a good idea to know your test results and keep a list of the medicines you take. How can you care for yourself at home? · Work with your doctor or dietitian to create a food plan that guides your daily food choices. · Eat a balanced diet. Do not skip meals or go for many hours without eating. If you eat several small meals during the day, you have a better chance of getting the extra calories your body needs for energy. · Your doctor may recommend a high-carbohydrate diet to get enough calories, since you may have to limit fat. You may need to spread carbohydrate throughout your meals and snacks to control the amount of sugar in your blood. Eat six small meals a day, rather than three big ones. Carbohydrate is found in:  ¨ Whole-grain and refined breads and cereals. ¨ Some vegetables. ¨ Cooked beans (such as kidney or black beans) and peas (such as lentils or split peas). ¨ Fruits. ¨ Low-fat or nonfat milk and milk products, which also supply protein. ¨ Candy, table sugar, and sugary soda drinks (try to limit these).   · Follow your doctor's or dietitian's instructions on how to get the right amount of protein in your diet. Examples of animal protein are:  [employer], fish, and poultry. ¨ Eggs. ¨ Milk and milk products. · Your doctor or dietitian may ask you to eat a certain amount of protein that comes from plants (rather than protein that comes from animals). You can get plant protein from foods such as:  ¨ Cooked dried beans and peas. ¨ Peanut butter, nuts, and seeds. ¨ Tofu. · Limit salt, if your doctor tells you to. This will help prevent fluid buildup in your belly and chest, which can cause serious problems. Salt is in many prepared foods, such as chacko, canned foods, snack foods, sauces, and soups. Look for reduced-salt products. · Your doctor may recommend vitamin and mineral supplements. However, do not take any other medicine, including over-the-counter medicines, vitamins, and herbal products, without talking to your doctor first.  · Do NOT take acetaminophen (Tylenol), ibuprofen (Advil, Motrin), or naproxen (Aleve). These can cause more liver damage. · Do not drink any alcohol. It can harm your liver. Talk to your doctor if you need help to stop drinking. · If you have a loss of appetite or have nausea or vomiting, try to:  ¨ Stay away from foods and food smells that make you feel worse. ¨ Avoid greasy or fatty foods. ¨ Eat food that settles your stomach when it feels upset. Try crackers, dry toast, or pratik (pratik tea, hard pratik candy, or crystallized pratik). When should you call for help? Watch closely for changes in your health, and be sure to contact your doctor if you have any problems. Where can you learn more? Go to http://ernestina-nya.info/. Enter Z291 in the search box to learn more about \"Liver Disease Diet: Care Instructions. \"  Current as of: March 29, 2018  Content Version: 11.8  © 6657-4057 [employer]. Care instructions adapted under license by [employer] (which disclaims liability or warranty for this information).  If you have questions about a medical condition or this instruction, always ask your healthcare professional. [address] any warranty or liability for your use of this information. Nonalcoholic Steatohepatitis (MÉNDEZ): Care Instructions  Your Care Instructions    Nonalcoholic steatohepatitis (MÉNDEZ) is liver inflammation. It is caused by a buildup of fat in the liver. The fat buildup is not caused by drinking alcohol. Because of the inflammation, the liver does not work as well as it should. MÉNDEZ is part of a group of liver diseases called nonalcoholic fatty liver disease. In these diseases, fat builds up in the liver and sometimes causes liver damage. This damage can get worse over time. Follow-up care is a key part of your treatment and safety. Be sure to make and go to all appointments, and call your doctor if you are having problems. It's also a good idea to know your test results and keep a list of the medicines you take. How can you care for yourself at home? · Stay at a healthy weight. Or if you need to, slowly get to a healthy weight. · Control your cholesterol. Talk to your doctor about ways to lower your cholesterol, if needed. You might try getting active, taking medicines, and making healthy changes to your diet. · Eat healthy foods. This includes fruits, vegetables, lean meats and dairy, and whole grains. · If you have diabetes, keep your blood sugar at your target level. · Get at least 30 minutes of exercise on most days of the week. Walking is a good choice. You also may want to do other activities, such as running, swimming, cycling, or playing tennis or team sports. · Limit alcohol, or do not drink. Alcohol can damage the liver and cause health problems. When should you call for help? Call 911 anytime you think you may need emergency care.  For example, call if:    · You have trouble breathing.     · You vomit blood or what looks like coffee grounds.    Call your doctor now or seek immediate medical care if:    · You feel very sleepy or confused.     · You have new or worse belly pain.     · You have a fever.     · There is a new or increasing yellow tint to your skin or the whites of your eyes.     · You have any abnormal bleeding, such as:  ¨ Nosebleeds. ¨ Vaginal bleeding that is different (heavier, more frequent, at a different time of the month) than what you are used to. ¨ Bloody or black stools, or rectal bleeding. ¨ Bloody or pink urine.    Watch closely for changes in your health, and be sure to contact your doctor if:    · Your belly is getting bigger.     · You are gaining weight.     · You have any problems. Where can you learn more? Go to http://ernestina-nya.info/. Enter L769 in the search box to learn more about \"Nonalcoholic Steatohepatitis (MÉNDEZ): Care Instructions. \"  Current as of: March 28, 2018  Content Version: 11.8  © 5817-7267 [employer]. Care instructions adapted under license by [employer] (which disclaims liability or warranty for this information). If you have questions about a medical condition or this instruction, always ask your healthcare professional. [address] any warranty or liability for your use of this information. Nonalcoholic Steatohepatitis (MÉNDEZ): Care Instructions  Your Care Instructions    Nonalcoholic steatohepatitis (MÉNDEZ) is liver inflammation. It is caused by a buildup of fat in the liver. The fat buildup is not caused by drinking alcohol. Because of the inflammation, the liver does not work as well as it should. MÉNDEZ is part of a group of liver diseases called nonalcoholic fatty liver disease. In these diseases, fat builds up in the liver and sometimes causes liver damage. This damage can get worse over time. Follow-up care is a key part of your treatment and safety.  Be sure to make and go to all appointments, and call your doctor if you are having problems. It's also a good idea to know your test results and keep a list of the medicines you take. How can you care for yourself at home? · Stay at a healthy weight. Or if you need to, slowly get to a healthy weight. · Control your cholesterol. Talk to your doctor about ways to lower your cholesterol, if needed. You might try getting active, taking medicines, and making healthy changes to your diet. · Eat healthy foods. This includes fruits, vegetables, lean meats and dairy, and whole grains. · If you have diabetes, keep your blood sugar at your target level. · Get at least 30 minutes of exercise on most days of the week. Walking is a good choice. You also may want to do other activities, such as running, swimming, cycling, or playing tennis or team sports. · Limit alcohol, or do not drink. Alcohol can damage the liver and cause health problems. When should you call for help? Call 911 anytime you think you may need emergency care. For example, call if:    · You have trouble breathing.     · You vomit blood or what looks like coffee grounds.    Call your doctor now or seek immediate medical care if:    · You feel very sleepy or confused.     · You have new or worse belly pain.     · You have a fever.     · There is a new or increasing yellow tint to your skin or the whites of your eyes.     · You have any abnormal bleeding, such as:  ¨ Nosebleeds. ¨ Vaginal bleeding that is different (heavier, more frequent, at a different time of the month) than what you are used to. ¨ Bloody or black stools, or rectal bleeding. ¨ Bloody or pink urine.    Watch closely for changes in your health, and be sure to contact your doctor if:    · Your belly is getting bigger.     · You are gaining weight.     · You have any problems. Where can you learn more? Go to http://ernestina-nya.info/.   Enter W017 in the search box to learn more about \"Nonalcoholic Steatohepatitis (MÉNDEZ): Care Instructions. \"  Current as of: March 28, 2018  Content Version: 11.8  © 9070-9938 [employer], [employer]. Care instructions adapted under license by [employer] (which disclaims liability or warranty for this information). If you have questions about a medical condition or this instruction, always ask your healthcare professional. [address] any warranty or liability for your use of this information. Looks like the liver has improved a great deal with the weight loss. 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 Please continue weight loss and exercise efforts.

## 2018-10-17 LAB
AFP L3 MFR SERPL: NORMAL % (ref 0–9.9)
AFP SERPL-MCNC: 2.4 NG/ML (ref 0–8)

## 2019-04-03 ENCOUNTER — HOSPITAL ENCOUNTER (OUTPATIENT)
Dept: ULTRASOUND IMAGING | Age: 79
Discharge: HOME | End: 2019-04-03
Payer: MEDICARE

## 2019-04-03 DIAGNOSIS — K74.60 CIRRHOSIS OF LIVER WITHOUT ASCITES, UNSPECIFIED HEPATIC CIRRHOSIS TYPE (HCC): ICD-10-CM

## 2019-04-03 PROCEDURE — 76705 ECHO EXAM OF ABDOMEN: CPT

## 2019-04-05 DIAGNOSIS — K74.60 CIRRHOSIS OF LIVER WITHOUT ASCITES, UNSPECIFIED HEPATIC CIRRHOSIS TYPE (HCC): Primary | ICD-10-CM

## 2019-04-05 NOTE — PROGRESS NOTES
Dynamic MRI ordered to better characterize a 1.8 cm hypoechoic area right hepatic lobe. Patient aware and provided contact information.

## 2019-04-12 ENCOUNTER — HOSPITAL ENCOUNTER (OUTPATIENT)
Dept: MRI IMAGING | Age: 79
Discharge: HOME | End: 2019-04-12
Payer: MEDICARE

## 2019-04-12 DIAGNOSIS — K74.60 CIRRHOSIS OF LIVER WITHOUT ASCITES, UNSPECIFIED HEPATIC CIRRHOSIS TYPE (HCC): ICD-10-CM

## 2019-04-12 LAB — CREAT UR-MCNC: 0.8 MG/DL (ref 0.6–1.3)

## 2019-04-12 PROCEDURE — 74183 MRI ABD W/O CNTR FLWD CNTR: CPT

## 2019-04-12 PROCEDURE — 82565 ASSAY OF CREATININE: CPT

## 2019-04-12 PROCEDURE — 74011636320 HC RX REV CODE- 636/320: Performed by: NURSE PRACTITIONER

## 2019-04-12 PROCEDURE — A9575 INJ GADOTERATE MEGLUMI 0.1ML: HCPCS | Performed by: NURSE PRACTITIONER

## 2019-04-12 RX ADMIN — GADOTERATE MEGLUMINE 20 ML: 376.9 INJECTION INTRAVENOUS at 12:47

## 2019-04-17 ENCOUNTER — OFFICE VISIT (OUTPATIENT)
Dept: HEMATOLOGY | Age: 79
End: 2019-04-17

## 2019-04-17 ENCOUNTER — HOSPITAL ENCOUNTER (OUTPATIENT)
Dept: LAB | Age: 79
Discharge: HOME | End: 2019-04-17
Payer: MEDICARE

## 2019-04-17 VITALS
BODY MASS INDEX: 31.28 KG/M2 | OXYGEN SATURATION: 97 % | TEMPERATURE: 96.3 F | WEIGHT: 170 LBS | DIASTOLIC BLOOD PRESSURE: 87 MMHG | HEART RATE: 61 BPM | HEIGHT: 62 IN | SYSTOLIC BLOOD PRESSURE: 164 MMHG

## 2019-04-17 DIAGNOSIS — K75.81 NASH (NONALCOHOLIC STEATOHEPATITIS): Primary | ICD-10-CM

## 2019-04-17 DIAGNOSIS — K75.81 NASH (NONALCOHOLIC STEATOHEPATITIS): ICD-10-CM

## 2019-04-17 LAB
ALBUMIN SERPL-MCNC: 4 G/DL (ref 3.4–5)
ALBUMIN/GLOB SERPL: 1.2 {RATIO} (ref 0.8–1.7)
ALP SERPL-CCNC: 66 U/L (ref 45–117)
ALT SERPL-CCNC: 37 U/L (ref 13–56)
ANION GAP SERPL CALC-SCNC: 5 MMOL/L (ref 3–18)
AST SERPL-CCNC: 32 U/L (ref 15–37)
BASOPHILS # BLD: 0 K/UL (ref 0–0.1)
BASOPHILS NFR BLD: 0 % (ref 0–2)
BILIRUB DIRECT SERPL-MCNC: 0.2 MG/DL (ref 0–0.2)
BILIRUB SERPL-MCNC: 0.7 MG/DL (ref 0.2–1)
BUN SERPL-MCNC: 17 MG/DL (ref 7–18)
BUN/CREAT SERPL: 20 (ref 12–20)
CALCIUM SERPL-MCNC: 9.5 MG/DL (ref 8.5–10.1)
CHLORIDE SERPL-SCNC: 106 MMOL/L (ref 100–108)
CO2 SERPL-SCNC: 30 MMOL/L (ref 21–32)
CREAT SERPL-MCNC: 0.87 MG/DL (ref 0.6–1.3)
DIFFERENTIAL METHOD BLD: ABNORMAL
EOSINOPHIL # BLD: 0.1 K/UL (ref 0–0.4)
EOSINOPHIL NFR BLD: 2 % (ref 0–5)
ERYTHROCYTE [DISTWIDTH] IN BLOOD BY AUTOMATED COUNT: 15.2 % (ref 11.6–14.5)
FERRITIN SERPL-MCNC: 82 NG/ML (ref 8–388)
GLOBULIN SER CALC-MCNC: 3.3 G/DL (ref 2–4)
GLUCOSE SERPL-MCNC: 108 MG/DL (ref 74–99)
HCT VFR BLD AUTO: 44.9 % (ref 35–45)
HGB BLD-MCNC: 14.6 G/DL (ref 12–16)
INR PPP: 1.1 (ref 0.8–1.2)
IRON SATN MFR SERPL: 21 %
IRON SERPL-MCNC: 81 UG/DL (ref 50–175)
LYMPHOCYTES # BLD: 0.9 K/UL (ref 0.9–3.6)
LYMPHOCYTES NFR BLD: 24 % (ref 21–52)
MCH RBC QN AUTO: 30.6 PG (ref 24–34)
MCHC RBC AUTO-ENTMCNC: 32.5 G/DL (ref 31–37)
MCV RBC AUTO: 94.1 FL (ref 74–97)
MONOCYTES # BLD: 0.3 K/UL (ref 0.05–1.2)
MONOCYTES NFR BLD: 7 % (ref 3–10)
NEUTS SEG # BLD: 2.5 K/UL (ref 1.8–8)
NEUTS SEG NFR BLD: 67 % (ref 40–73)
PLATELET # BLD AUTO: 95 K/UL (ref 135–420)
PMV BLD AUTO: 12.9 FL (ref 9.2–11.8)
POTASSIUM SERPL-SCNC: 4 MMOL/L (ref 3.5–5.5)
PROT SERPL-MCNC: 7.3 G/DL (ref 6.4–8.2)
PROTHROMBIN TIME: 13.4 SEC (ref 11.5–15.2)
RBC # BLD AUTO: 4.77 M/UL (ref 4.2–5.3)
SODIUM SERPL-SCNC: 141 MMOL/L (ref 136–145)
TIBC SERPL-MCNC: 390 UG/DL (ref 250–450)
WBC # BLD AUTO: 3.8 K/UL (ref 4.6–13.2)

## 2019-04-17 PROCEDURE — 82728 ASSAY OF FERRITIN: CPT

## 2019-04-17 PROCEDURE — 83540 ASSAY OF IRON: CPT

## 2019-04-17 PROCEDURE — 85025 COMPLETE CBC W/AUTO DIFF WBC: CPT

## 2019-04-17 PROCEDURE — 36415 COLL VENOUS BLD VENIPUNCTURE: CPT

## 2019-04-17 PROCEDURE — 80076 HEPATIC FUNCTION PANEL: CPT

## 2019-04-17 PROCEDURE — 85610 PROTHROMBIN TIME: CPT

## 2019-04-17 PROCEDURE — 80048 BASIC METABOLIC PNL TOTAL CA: CPT

## 2019-04-17 PROCEDURE — 82107 ALPHA-FETOPROTEIN L3: CPT

## 2019-04-17 NOTE — PROGRESS NOTES
[address] [facility] MD Mitch, GLENN Arguello PA-C Coralie Bald, MD, FACP, [facility]  Pastor Sanchez NP        at [facility]     [address] [facility], [address], [address]     [phone]     FAX: [phone]    at [place] [address], [address] [place] [address], [address]     [phone]     FAX: [phone]       Patient Care Team:  Yuly Guillaume MD as PCP - General (Family Practice)  Michael Garcia MD (Gastroenterology)  Farrukh Finney MD (Urology)  Jessica Collins MD (Hematology and Oncology)  Anne Olivares MD (Endocrinology)  Amisha Polanco MD (Pulmonary Disease)      Problem List  Date Reviewed: 4/17/2019          Codes Class Noted    UTI (urinary tract infection) ICD-10-CM: N39.0  ICD-9-CM: 599.0  7/13/2018        Female cystocele ICD-10-CM: N81.10  ICD-9-CM: 618.01  3/9/2018        MÉNDEZ (nonalcoholic steatohepatitis) ICD-10-CM: K75.81  ICD-9-CM: 571.8  11/29/2014        Diabetes mellitus ([address] ICD-10-CM: E11.9  ICD-9-CM: 250.00  11/5/2013        Gout ICD-10-CM: M10.9  ICD-9-CM: 274.9  11/5/2013        GERD (gastroesophageal reflux disease) ICD-10-CM: K21.9  ICD-9-CM: 530.81  11/5/2013        Hypothyroidism ICD-10-CM: E03.9  ICD-9-CM: 244.9  11/5/2013        Hypertension ICD-10-CM: I10  ICD-9-CM: 401.9  11/5/2013        S/P cholecystectomy ICD-10-CM: Z90.49  ICD-9-CM: V45.79  11/5/2013        S/P EULALIA (total abdominal hysterectomy) ICD-10-CM: Z90.710  ICD-9-CM: V88.01  11/5/2013              Beverley Benavides [facility] of MÉNDEZ. The active problem list, all pertinent past medical history, medications, liver histology, endoscopic studies, radiologic findings and laboratory findings related to the liver disorder were reviewed with the patient. Serologic evaluation was positive for JAEL and ASMA. A liver biopsy performed in 2005 suggested MÉNDEZ with fibrosis. She had been on prednisone for macrobid induced pulmonary fibrosis. While on prednisone the liver enzymes normalized. When off prednisone the liver enzymes increased. She had a very high titer positive JAEL and ASMA. The possibility she had AIH and not MÉNDEZ was considered. Repeat liver biopsy in 8/2014 demonstrates MÉNDEZ with bridging fibrosis. Ultrasound imaging of the liver was performed in 04/2019. This demonstrates a 1.6 X 1.2 cm subcapsular mass in the right hepatic lobe. This was better characterized with dynamic MRI in 04/2019 and is classified as a LI-RADS 3 lesion. The patient feels well and voiced no complaints today. She is helping to care for her grandchildren. She is also caring for her ailing spouse. She is back to walking 2 miles a day after recovery from surgery of her prolapsed bladder. She was walking 4 to 6 miles a day prior to her surgery. She had lost about 25 pounds with her exercise regime. The patient has no complaints which can be attributed to liver disease. The patient completes all daily activities without any functional limitations. The patient has not experienced fatigue, fevers, chills, shortness of breath, chest pain, pain in the right side over the liver, diffuse abdominal pain, nausea, vomiting, constipation, diarrhrea, dry eyes, dry mouth, arthralgias, myalgias, yellowing of the eyes or skin, itching, dark urine, problems concentrating, swelling of the abdomen, swelling of the lower extremities, hematemesis, or hematochezia.          Allergies   Allergen Reactions    Ciprofloxacin Itching     \"scalp burns and body itches\"    Macrobid [Nitrofurantoin Monohyd/M-Cryst] Other (comments)     Scarred lungs    Serotonin 5ht-3 Antagonists Other (comments)     fever    Sulfa (Sulfonamide Antibiotics) Hives    Tetanus Immune Globulin Swelling     Current Outpatient Medications   Medication Sig    vit B complex no.12/niacin,B3, (VITAMIN B COMPLEX NO.12-NIACIN PO) Take 1,000 mg by mouth.  glucose blood VI test strips (PRECISION XTRA TEST) strip Use to check bs 5x daily    ferrous sulfate 325 mg (65 mg iron) tablet 325 mg.    GIGI PEN NEEDLE 32 gauge x 5/32\" ndle     cyanocobalamin 1,000 mcg tablet Take 1,000 mcg by mouth daily.  metoprolol succinate (TOPROL-XL) 100 mg tablet Take  by mouth daily.  methenamine hippurate (HIPREX) 1 gram tablet Take 1 Tab by mouth two (2) times daily (with meals).  atorvastatin (LIPITOR) 10 mg tablet Take  by mouth daily.  insulin lispro (HUMALOG) 100 unit/mL kwikpen by SubCUTAneous route Before breakfast, lunch, dinner and at bedtime. Sliding scale   Indications: TYPE 2 DIABETES MELLITUS    allopurinol (ZYLOPRIM) 300 mg tablet Take  by mouth daily. Indications: GOUT    fenofibrate (TRICOR) 160 mg tablet Take 160 mg by mouth daily. Indications: HYPERCHOLESTEROLEMIA    levothyroxine (SYNTHROID) 125 mcg tablet Take 125 mcg by mouth Daily (before breakfast). 125mcg Tuesday, W ednesday, Friday,Saturday, Sunday  62. 5mcg Monday, Thursday  Indications: HYPOTHYROIDISM    insulin detemir (LEVEMIR) 100 unit/mL (3 mL) pen 50 Units by SubCUTAneous route two (2) times a day. 15 units in the morning and 35 units at night. Indications: type 2 diabetes mellitus    omeprazole (PRILOSEC) 20 mg capsule Take 20 mg by mouth daily. Indications: GASTROESOPHAGEAL REFLUX    acetaminophen-codeine (TYLENOL #3) 300-30 mg per tablet     nitroglycerin (NITROSTAT) 0.4 mg SL tablet 0.4 mg by SubLINGual route every five (5) minutes as needed for Chest Pain. No current facility-administered medications for this visit. REVIEW OF SYSTEMS:  Constitution systems: Negative for fever, chills, weight gain, weight loss. Eyes: Negative for diplopia, visual changes, eye pain. ENT: Negative for sore throat, painful swallowing.    Respiratory: Negative for cough, hemoptysis, dyspnea. Cardiology: Negative for chest pain, palpitations. GI:  Negative for constipation or diarrhea. : Negative for urinary frequency, dysuria and hematuria. Skin: Negative for rash. Hematology: Negative for easy bruising, bleeding from gums or nose. Musculo-skelatal: Negative for back pain, muscle pain, weakness. Neurologic: Negative for headaches, dizziness, vertigo, memory problems. Psychology: Negative for anxiety, depression. FAMILY/SOCIAL HISTORY:  The patient is . The patient has 3 children, and 10 grandchildren. The patient has never used tobacco products. The patient consumes 5 alcoholic beverages per week. 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 PHYSICAL EXAMINATION:    Visit Vitals  /87 (BP 1 Location: Left arm, BP Patient Position: Sitting)   Pulse 61   Temp 96.3 °F (35.7 °C)   Ht 5' 2\" (1.575 m)   Wt 170 lb (77.1 kg)   SpO2 97%   BMI 31.09 kg/m²       General: No acute distress. Eyes: Sclera anicteric. ENT: No oral lesions. Thyroid normal.  Nodes: No adenopathy. Skin: No spider angiomata. No jaundice. No palmar erythema. Respiratory: Lungs clear to auscultation. Cardiovascular: Regular heart rate. No murmurs. No JVD. Abdomen: Soft non-tender. Liver size normal to percussion/palpation. Spleen not palpable. No obvious ascites. Extremities: No edema. No muscle wasting. No gross arthritic changes. Neurologic: Alert and oriented. Cranial nerves grossly intact. No asterixsis.     LABORATORY STUDIES:  [facility] [address] & Units 10/27/2016 4/27/2016   WBC 4.6 - 13.2 K/uL 4.7 3.9 (L)   ANC 1.8 - 8.0 K/UL 3.1 2.4   HGB 12.0 - 16.0 g/dL 13.5 13.0    - 420 K/uL 94 (L) 100 (L)   INR 0.8 - 1.2     AST 15 - 37 U/L 51 (H) 92 (H)   ALT 13 - 56 U/L 48 91 (H)   Alk Phos 45 - 117 U/L 49 66   Bili, Total 0.2 - 1.0 MG/DL 0.3 0.5   Bili, Direct 0.0 - 0.2 MG/DL 0.1 0.1   Albumin 3.4 - 5.0 g/dL 4.0 4.2   BUN 7.0 - 18 MG/DL 30 (H) 18   Creat 0.6 - 1.3 MG/DL 0.97 0.82   Na 136 - 145 mmol/L 145 143   K 3.5 - 5.5 mmol/L 4.5 4.4   Cl 100 - 108 mmol/L 105 104   CO2 21 - 32 mmol/L 30 30   Glucose 74 - 99 mg/dL 137 (H) 171 (H)     Liver [facility] Latest Ref Rng & Units 7/24/2014   WBC 4.6 - 13.2 K/uL 5.2   ANC 1.8 - 8.0 K/UL 3.1   HGB 12.0 - 16.0 g/dL 13.8    - 420 K/uL 124 (L)   INR 0.8 - 1.2 1.0   AST 15 - 37 U/L 62 (H)   ALT 13 - 56 U/L 65 (H)   Alk Phos 45 - 117 U/L 74   Bili, Total 0.2 - 1.0 MG/DL 0.4   Bili, Direct 0.0 - 0.2 MG/DL 0.13   Albumin 3.4 - 5.0 g/dL 4.5   BUN 7.0 - 18 MG/DL 17   Creat 0.6 - 1.3 MG/DL 0.74   Na 136 - 145 mmol/L 139   K 3.5 - 5.5 mmol/L 4.6   Cl 100 - 108 mmol/L 100   CO2 21 - 32 mmol/L 24   Glucose 74 - 99 mg/dL 218 (H)     Cancer Screening Latest Ref Rng & Units 10/16/2018 4/16/2018 10/12/2017   AFP, Serum 0.0 - 8.0 ng/mL 2.4 2.1 1.5   AFP-L3% 0.0 - 9.9 % Comment Comment Comment     SEROLOGIES:  Serologies Latest Ref Rng 11/5/2013   Hep B Surface Ag Negative Negative   Hep B Core Ab, Total Negative Negative   Ferritin 15 - 150 ng/mL 213 (H)   Iron % Saturation 15 - 55 % 14 (L)   JAEL, IFA  See patterns   JAEL Pattern  1:1280 (H)   ASMCA 0 - 19 Units 35 (H)   Alpha-1 antitrypsin level 90 - 200 mg/dL 176     11/2013. Anti-HBsurface negative, anti-HCV negative. LIVER HISTOLOGY:  2005. MÉNDEZ with fibrosis  . 8/2014. Slides reviewed by MLS. MÉNDEZ with bridging fibrosis. 06/2017. TRANSIENT HEPATIC ELASTOGRAPHY:   E Range: 7.3-11.5 kPa  E Mean: 8.9 kPa  E Median: 8.7 kPa  E Std: 1.5 kPa    07/2018. TRANSIENT HEPATIC ELASTOGRAPHY:   E Range: 4.91-11.28 kPa  E Mean: 7.38 kPa  E Median: 7.17 kPa  E Std: 1.8 kPa    ENDOSCOPIC PROCEDURES:  Not available or performed    RADIOLOGY:  11/2013. Ultrasound of liver. Echogenic consistent with chronic liver disease. No liver mass lesions. No dilated bile ducts.   No ascites. 10/2015: Ultrasound of liver. Echogenic consistent with chronic liver disease. No liver mass lesions. No dilated bile ducts. No ascites. 6/2016: Ultrasound of liver. Echogenic consistent with chronic liver disease. No liver mass lesions. No dilated bile ducts. No ascites. 06/2017. Ultrasound of the liver. Evidence of chronic hepatocellular disease without suspicious mass   07/2018. Ultrasound of the liver. Heterogeneous and slightly coarse in echogenicity. There is incidental small anechoic 1.7 cm cyst in the left hepatic lobe. No evidence of solid mass. 04/2019. Ultrasound of the liver. Persistent sonographic findings of fibrosis from chronic hepatocellular changes and/or steatosis. Interval appearing anterior subcapsular right lobe of liver circumscribed indeterminate 1.8 cm hypoechoic lesion. 04/2019. Dynamic MRI of the liver. Hepatic steatosis with possible cirrhosis. Inferior right hepatic lobe nodule identified possibly corresponding with the hypoechoic nodule in the ultrasound. This is an LR-3 lesion. No arterial enhancing nodules or masses identified. OTHER TESTING:  Not available or performed    ASSESSMENT AND PLAN:  MÉNDEZ with bridging fibrosis. The most recent laboratory studies indicate that the liver transaminases are elevated (AST>ALT), alkaline phosphatase is normal, tests of hepatic synthetic and metabolic function are normal, and the platelet count is depressed. Based on labs, the patient appears to have cirrhosis. Will perform laboratory testing to monitor liver function and degree of liver injury. This will include hepatic panel, a CBC w/ diff, a BMP, a PT/INR, and an AFP-L3%. We will monitor the right lobe LI-RADS 3 mass with MRI's for the next two years. If the mass does not change, then it is benign by definition. Repeat MRI was ordered today to be performed in 07/2019. She will follow up with me shortly after the repeat imaging.       The patient was counseled regarding diet and exercise to achieve weight loss. The best diet for patients with fatty liver is one very low in carbohydrates and enriched with protein such as an [address] program.      The patient underwent surgery for a prolapsed bladder is is still recovery, but is back to walking 2 miles a day. Before the surgery, she had been walking 4 to 6 miles a day. The patient was directed to continue all current medications at the current dosages. There are no contraindications for the patient to take any medications that are necessary for treatment of other medical issues including medications for diabetes mellitus and hypercholesterolemia. The patient was counseled regarding alcohol consumption and that this could contribute to fatty liver disease. Vaccination for viral hepatitis B is recommended since the patient has no serologic evidence of previous exposure or vaccination with immunity. The need for vaccination against viral hepatitis A will be assessed with serologic and instituted as appropriate. 25 minutes total time spent with this patient with more than 50% of this time spent counseling and coordinating care as described above. [address] [place] [address] in 3 months, after the MRI.     Martha Viera NP  Liver [place] [facility] of [address] [place] [address], [address] [place] [address] [facility], [address]  [phone]

## 2019-04-17 NOTE — PROGRESS NOTES
1. Have you been to the ER, urgent care clinic since your last visit? Hospitalized since your last visit? No    2. Have you seen or consulted any other health care providers outside of the [address] [place] [address] since your last visit? Include any pap smears or colon screening. Yes .

## 2019-04-19 LAB
AFP L3 MFR SERPL: NORMAL % (ref 0–9.9)
AFP SERPL-MCNC: 2.2 NG/ML (ref 0–8)

## 2019-07-16 ENCOUNTER — HOSPITAL ENCOUNTER (OUTPATIENT)
Dept: MRI IMAGING | Age: 79
Discharge: HOME | End: 2019-07-16
Payer: MEDICARE

## 2019-07-16 DIAGNOSIS — K75.81 NASH (NONALCOHOLIC STEATOHEPATITIS): ICD-10-CM

## 2019-07-16 PROCEDURE — A9575 INJ GADOTERATE MEGLUMI 0.1ML: HCPCS | Performed by: NURSE PRACTITIONER

## 2019-07-16 PROCEDURE — 74011636320 HC RX REV CODE- 636/320: Performed by: NURSE PRACTITIONER

## 2019-07-16 PROCEDURE — 74183 MRI ABD W/O CNTR FLWD CNTR: CPT

## 2019-07-16 RX ADMIN — GADOTERATE MEGLUMINE 15 ML: 376.9 INJECTION INTRAVENOUS at 11:37

## 2019-07-20 NOTE — PROGRESS NOTES
Please let her know that her MRI is not concerning. We will continue advanced imaging at 6 month intervals to keep an eye on the small, non-enhancing nodule in the right hepatic lobe. Tumor markers are negative. Thank you.

## 2019-07-29 ENCOUNTER — OFFICE VISIT (OUTPATIENT)
Dept: HEMATOLOGY | Age: 79
End: 2019-07-29

## 2019-07-29 VITALS
DIASTOLIC BLOOD PRESSURE: 79 MMHG | HEART RATE: 65 BPM | BODY MASS INDEX: 31.28 KG/M2 | WEIGHT: 170 LBS | OXYGEN SATURATION: 98 % | HEIGHT: 62 IN | SYSTOLIC BLOOD PRESSURE: 162 MMHG | RESPIRATION RATE: 16 BRPM | TEMPERATURE: 96.4 F

## 2019-07-29 DIAGNOSIS — K74.60 CIRRHOSIS OF LIVER WITHOUT ASCITES, UNSPECIFIED HEPATIC CIRRHOSIS TYPE (HCC): Primary | ICD-10-CM

## 2019-07-29 NOTE — PROGRESS NOTES
[address] [place] [address], MD, Elyse Self, MD Debra Monahan, PERLA Bill, [facility]     Elizabeth Camacho, [facility]   Michael Fang FNP-MARYAM Cartagena, [facility]       Simba Lino Count includes the [facility] 136    at [address]    217 [facility], [address] [place] [address], [facility] 22.    [phone]    FAX: [address] [place] [address] Avenue    [address], [address]    [phone]    FAX: [phone]         Patient Care Team:  Nicole Dang MD as PCP - General (Family Practice)  Delbert Martinez MD (Gastroenterology)  Cayla Starr MD (Urology)  Deloris Mckeon MD (Hematology and Oncology)  Steven Weber MD (Endocrinology)  Augustina Bal MD (Pulmonary Disease)      Problem List  Date Reviewed: 4/17/2019          Codes Class Noted    UTI (urinary tract infection) ICD-10-CM: N39.0  ICD-9-CM: 599.0  7/13/2018        Female cystocele ICD-10-CM: N81.10  ICD-9-CM: 618.01  3/9/2018        MÉNDEZ (nonalcoholic steatohepatitis) ICD-10-CM: K75.81  ICD-9-CM: 571.8  11/29/2014        Diabetes mellitus ([facility] ICD-10-CM: E11.9  ICD-9-CM: 250.00  11/5/2013        Gout ICD-10-CM: M10.9  ICD-9-CM: 274.9  11/5/2013        GERD (gastroesophageal reflux disease) ICD-10-CM: K21.9  ICD-9-CM: 530.81  11/5/2013        Hypothyroidism ICD-10-CM: E03.9  ICD-9-CM: 244.9  11/5/2013        Hypertension ICD-10-CM: I10  ICD-9-CM: 401.9  11/5/2013        S/P cholecystectomy ICD-10-CM: Z90.49  ICD-9-CM: V45.79  11/5/2013        S/P EULALIA (total abdominal hysterectomy) ICD-10-CM: Z90.710  ICD-9-CM: V88.01  11/5/2013              Leno Wilson returns to the [place] [address] for monitoring of MÉNDEZ/cirrhosis.   The active problem list, all pertinent past medical history, medications, liver histology, endoscopic studies, radiologic findings and laboratory findings related to the liver disorder were reviewed with the patient. Serologic evaluation was positive for JAEL and ASMA. A liver biopsy performed in 2005 suggested MÉNDEZ with fibrosis. She had been on prednisone for macrobid induced pulmonary fibrosis. While on prednisone the liver enzymes normalized. When off prednisone the liver enzymes increased. She had a very high titer positive JAEL and ASMA. The possibility she had AIH and not MÉNDEZ was considered. Repeat liver biopsy in 8/2014 demonstrates MÉNDEZ with bridging fibrosis. Ultrasound imaging of the liver was performed in 04/2019. This demonstrates a 1.6 X 1.2 cm subcapsular mass in the right hepatic lobe. This was better characterized with dynamic MRI in 04/2019 and is classified as a LI-RADS 3 lesion. MRI was repeated in 07/2019. This remains a LI-RADS 3 lesion. The patient feels well and voiced no complaints today. She is caring for her ailing spouse. She is back to walking 2.5 miles a day after recovery from surgery of her prolapsed bladder. She was walking 4 to 6 miles a day prior to her surgery. She had lost about 25 pounds with her exercise regime. The patient has no complaints which can be attributed to liver disease. The patient completes all daily activities without any functional limitations. The patient has not experienced fatigue, fevers, chills, shortness of breath, chest pain, pain in the right side over the liver, diffuse abdominal pain, nausea, vomiting, constipation, diarrhrea, dry eyes, dry mouth, arthralgias, myalgias, yellowing of the eyes or skin, itching, dark urine, problems concentrating, swelling of the abdomen, swelling of the lower extremities, hematemesis, or hematochezia.          Allergies   Allergen Reactions    Ciprofloxacin Itching     \"scalp burns and body itches\"   Yvon Fernando [Nitrofurantoin Monohyd/M-Cryst] Other (comments)     Scarred lungs    Serotonin 5ht-3 Antagonists Other (comments)     fever    Sulfa (Sulfonamide Antibiotics) Hives    Tetanus Immune Globulin Swelling     Current Outpatient Medications   Medication Sig    vit B complex no.12/niacin,B3, (VITAMIN B COMPLEX NO.12-NIACIN PO) Take 1,000 mg by mouth.  glucose blood VI test strips (PRECISION XTRA TEST) strip Use to check bs 5x daily    GIGI PEN NEEDLE 32 gauge x 5/32\" ndle     cyanocobalamin 1,000 mcg tablet Take 1,000 mcg by mouth daily.  metoprolol succinate (TOPROL-XL) 100 mg tablet Take  by mouth daily.  atorvastatin (LIPITOR) 10 mg tablet Take  by mouth daily.  insulin lispro (HUMALOG) 100 unit/mL kwikpen by SubCUTAneous route Before breakfast, lunch, dinner and at bedtime. Sliding scale   Indications: TYPE 2 DIABETES MELLITUS    allopurinol (ZYLOPRIM) 300 mg tablet Take  by mouth daily. Indications: GOUT    levothyroxine (SYNTHROID) 125 mcg tablet Take 125 mcg by mouth Daily (before breakfast). 125mcg Tuesday, W ednesday, Friday,Saturday, Sunday  62. 5mcg Monday, Thursday  Indications: HYPOTHYROIDISM    insulin detemir (LEVEMIR) 100 unit/mL (3 mL) pen 50 Units by SubCUTAneous route two (2) times a day. 20 units in the morning and 40 units at night. Indications: type 2 diabetes mellitus    omeprazole (PRILOSEC) 20 mg capsule Take 20 mg by mouth daily. Indications: GASTROESOPHAGEAL REFLUX    nitroglycerin (NITROSTAT) 0.4 mg SL tablet 0.4 mg by SubLINGual route every five (5) minutes as needed for Chest Pain. No current facility-administered medications for this visit. REVIEW OF SYSTEMS:  Constitution systems: Negative for fever, chills, weight gain, weight loss. Eyes: Negative for diplopia, visual changes, eye pain. ENT: Negative for sore throat, painful swallowing. Respiratory: Negative for cough, hemoptysis, dyspnea. Cardiology: Negative for chest pain, palpitations.   GI: Negative for constipation or diarrhea. : Negative for urinary frequency, dysuria and hematuria. Skin: Negative for rash. Hematology: Negative for easy bruising, bleeding from gums or nose. Musculo-skelatal: Negative for back pain, muscle pain, weakness. Neurologic: Negative for headaches, dizziness, vertigo, memory problems. Psychology: Negative for anxiety, depression. FAMILY/SOCIAL HISTORY:  The patient is . The patient has 3 children, and 10 grandchildren. The patient has never used tobacco products. The patient consumes 5 alcoholic beverages per week. 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 PHYSICAL EXAMINATION:    Visit Vitals  /79 (BP 1 Location: Left arm, BP Patient Position: Sitting)   Pulse 65   Temp 96.4 °F (35.8 °C) (Tympanic)   Resp 16   Ht 5' 2\" (1.575 m)   Wt 170 lb (77.1 kg)   SpO2 98%   BMI 31.09 kg/m²       General: No acute distress. Eyes: Sclera anicteric. ENT: No oral lesions. Thyroid normal.  Nodes: No adenopathy. Skin: No spider angiomata. No jaundice. No palmar erythema. Respiratory: Lungs clear to auscultation. Cardiovascular: Regular heart rate. No murmurs. No JVD. Abdomen: Soft non-tender. Liver size normal to percussion/palpation. Spleen not palpable. No obvious ascites. Extremities: No edema. No muscle wasting. No gross arthritic changes. Neurologic: Alert and oriented. Cranial nerves grossly intact. No asterixsis.     LABORATORY STUDIES:  [facility] of [address] [place] [address] 4/17/2019   WBC 4.6 - 13.2 K/uL 3.8 (L)   ANC 1.8 - 8.0 K/UL 2.5   HGB 12.0 - 16.0 g/dL 14.6    - 420 K/uL 95 (L)   INR 0.8 - 1.2   1.1   AST 15 - 37 U/L 32   ALT 13 - 56 U/L 37   Alk Phos 45 - 117 U/L 66   Bili, Total 0.2 - 1.0 MG/DL 0.7   Bili, Direct 0.0 - 0.2 MG/DL 0.2   Albumin 3.4 - 5.0 g/dL 4.0   BUN 7.0 - 18 MG/DL 17   Creat 0.6 - 1.3 MG/DL 0.87 Creat (iSTAT) 0.6 - 1.3 MG/DL    Na 136 - 145 mmol/L 141   K 3.5 - 5.5 mmol/L 4.0   Cl 100 - 108 mmol/L 106   CO2 21 - 32 mmol/L 30   Glucose 74 - 99 mg/dL 108 (H)     Liver [place] of [address] [place] [address] Ref Rng & Units 10/16/2018 4/16/2018   WBC 4.6 - 13.2 K/uL 3.0 (L) 3.3 (L)   ANC 1.8 - 8.0 K/UL 1.8 2.1   HGB 12.0 - 16.0 g/dL 13.8 12.4    - 420 K/uL 79 (L) 88 (L)   INR 0.8 - 1.2   1.1 1.1   AST 15 - 37 U/L 34 30   ALT 13 - 56 U/L 38 33   Alk Phos 45 - 117 U/L 50 49   Bili, Total 0.2 - 1.0 MG/DL 0.4 0.3   Bili, Direct 0.0 - 0.2 MG/DL 0.1 0.1   Albumin 3.4 - 5.0 g/dL 3.9 3.6   BUN 7.0 - 18 MG/DL 26 (H) 23 (H)   Creat 0.6 - 1.3 MG/DL 0.84 0.84   Creat (iSTAT) 0.6 - 1.3 MG/DL     Na 136 - 145 mmol/L 144 145   K 3.5 - 5.5 mmol/L 4.3 4.5   Cl 100 - 108 mmol/L 106 108   CO2 21 - 32 mmol/L 29 29   Glucose 74 - 99 mg/dL 127 (H) 165 (H)     Cancer Screening Latest Ref Rng & Units 10/16/2018 4/16/2018 10/12/2017   AFP, Serum 0.0 - 8.0 ng/mL 2.4 2.1 1.5   AFP-L3% 0.0 - 9.9 % Comment Comment Comment     SEROLOGIES:  Serologies Latest Ref Rng 11/5/2013   Hep B Surface Ag Negative Negative   Hep B Core Ab, Total Negative Negative   Ferritin 15 - 150 ng/mL 213 (H)   Iron % Saturation 15 - 55 % 14 (L)   JAEL, IFA  See patterns   JAEL Pattern  1:1280 (H)   ASMCA 0 - 19 Units 35 (H)   Alpha-1 antitrypsin level 90 - 200 mg/dL 176     11/2013. Anti-HBsurface negative, anti-HCV negative. LIVER HISTOLOGY:  2005. MÉNDEZ with fibrosis  . 8/2014. Slides reviewed by MLS. MÉNDEZ with bridging fibrosis. 06/2017. TRANSIENT HEPATIC ELASTOGRAPHY:   E Range: 7.3-11.5 kPa  E Mean: 8.9 kPa  E Median: 8.7 kPa  E Std: 1.5 kPa    07/2018. TRANSIENT HEPATIC ELASTOGRAPHY:   E Range: 4.91-11.28 kPa  E Mean: 7.38 kPa  E Median: 7.17 kPa  E Std: 1.8 kPa    ENDOSCOPIC PROCEDURES:  Not available or performed    RADIOLOGY:  11/2013. Ultrasound of liver. Echogenic consistent with chronic liver disease. No liver mass lesions.   No dilated bile ducts. No ascites. 10/2015: Ultrasound of liver. Echogenic consistent with chronic liver disease. No liver mass lesions. No dilated bile ducts. No ascites. 6/2016: Ultrasound of liver. Echogenic consistent with chronic liver disease. No liver mass lesions. No dilated bile ducts. No ascites. 06/2017. Ultrasound of the liver. Evidence of chronic hepatocellular disease without suspicious mass   07/2018. Ultrasound of the liver. Heterogeneous and slightly coarse in echogenicity. There is incidental small anechoic 1.7 cm cyst in the left hepatic lobe. No evidence of solid mass. 04/2019. Ultrasound of the liver. Persistent sonographic findings of fibrosis from chronic hepatocellular changes and/or steatosis. Interval appearing anterior subcapsular right lobe of liver circumscribed indeterminate 1.8 cm hypoechoic lesion. 04/2019. Dynamic MRI of the liver. Hepatic steatosis with possible cirrhosis. Inferior right hepatic lobe nodule identified possibly corresponding with the hypoechoic nodule in the ultrasound. This is an LR-3 lesion. No arterial enhancing nodules or masses identified. 07/2019. Dynamic MRI of the abdomen. Small area of progressive nodular enhancement in the inferior right hepatic lobe, which does not enhance in the arterial phase, but demonstrates enhancement in the portal venous phase without washout, is unchanged in appearance since 4/12/2019. There is persistent nodular enhancement on the equilibrium phase imaging. No washout. LR-3.    OTHER TESTING:  Not available or performed    ASSESSMENT AND PLAN:  MÉNDEZ with bridging fibrosis/cirrhosis. Based on labs, the patient appears to have cirrhosis. Will perform laboratory testing to monitor liver function and degree of liver injury. This will include hepatic panel, a CBC w/ diff, a BMP, a PT/INR, and an AFP-L3%. We will monitor the right lobe LI-RADS 3 mass with MRI's for the next two years.   If the mass does not change, then it is benign by definition. Repeat MRI was ordered today to be performed in 01/2020. She will follow up with me shortly after the repeat imaging. The patient was counseled regarding diet and exercise to achieve weight loss. The best diet for patients with fatty liver is one very low in carbohydrates and enriched with protein such as an [address] program.      The patient underwent surgery for a prolapsed bladder is still recovering, but is back to walking 2.5 miles a day. Before the surgery, she had been walking 4 to 6 miles a day. Still caring for her . The patient was directed to continue all current medications at the current dosages. There are no contraindications for the patient to take any medications that are necessary for treatment of other medical issues including medications for diabetes mellitus and hypercholesterolemia. The patient was counseled regarding alcohol consumption and that this could contribute to fatty liver disease. Vaccination for viral hepatitis B is recommended since the patient has no serologic evidence of previous exposure or vaccination with immunity. The need for vaccination against viral hepatitis A will be assessed with serologic and instituted as appropriate. 25 minutes total time spent with this patient with more than 50% of this time spent counseling and coordinating care as described above. [address] [place] [address] in 3 months, after the MRI.     Venancio Villagomez NP  Liver [place] of [facility] [address], [address] [place], [address]  [phone]

## 2019-07-29 NOTE — PROGRESS NOTES
Neno Floor is a 70 y.o. female      1. Have you been to the ER, urgent care clinic or hospitalized since your last visit? NO.     2. Have you seen or consulted any other health care providers outside of the [address] [place] [address] since your last visit (Include any pap smears or colon screening)?  NO          Learning Assessment 2/26/2014   PRIMARY LEARNER Patient   HIGHEST LEVEL OF EDUCATION - PRIMARY LEARNER  SOME COLLEGE   BARRIERS PRIMARY LEARNER NONE   CO-LEARNER CAREGIVER No   PRIMARY LANGUAGE ENGLISH   LEARNER PREFERENCE PRIMARY DEMONSTRATION   LEARNING SPECIAL TOPICS no    ANSWERED BY patient   RELATIONSHIP SELF

## 2020-01-02 ENCOUNTER — HOSPITAL ENCOUNTER (OUTPATIENT)
Dept: MRI IMAGING | Age: 80
Discharge: HOME | End: 2020-01-02
Payer: MEDICARE

## 2020-01-02 DIAGNOSIS — K74.60 CIRRHOSIS OF LIVER WITHOUT ASCITES, UNSPECIFIED HEPATIC CIRRHOSIS TYPE (HCC): ICD-10-CM

## 2020-01-02 LAB — CREAT UR-MCNC: 0.8 MG/DL (ref 0.6–1.3)

## 2020-01-02 PROCEDURE — A9575 INJ GADOTERATE MEGLUMI 0.1ML: HCPCS | Performed by: NURSE PRACTITIONER

## 2020-01-02 PROCEDURE — 82565 ASSAY OF CREATININE: CPT

## 2020-01-02 PROCEDURE — 74183 MRI ABD W/O CNTR FLWD CNTR: CPT

## 2020-01-02 PROCEDURE — 74011636320 HC RX REV CODE- 636/320: Performed by: NURSE PRACTITIONER

## 2020-01-02 RX ADMIN — GADOTERATE MEGLUMINE 15 ML: 376.9 INJECTION INTRAVENOUS at 10:44

## 2020-01-07 ENCOUNTER — TELEPHONE (OUTPATIENT)
Dept: HEMATOLOGY | Age: 80
End: 2020-01-07

## 2020-01-29 ENCOUNTER — HOSPITAL ENCOUNTER (OUTPATIENT)
Dept: LAB | Age: 80
Discharge: HOME | End: 2020-01-29
Payer: MEDICARE

## 2020-01-29 ENCOUNTER — OFFICE VISIT (OUTPATIENT)
Dept: HEMATOLOGY | Age: 80
End: 2020-01-29

## 2020-01-29 VITALS
HEART RATE: 67 BPM | OXYGEN SATURATION: 97 % | WEIGHT: 164.38 LBS | DIASTOLIC BLOOD PRESSURE: 72 MMHG | SYSTOLIC BLOOD PRESSURE: 150 MMHG | TEMPERATURE: 96.9 F | HEIGHT: 62 IN | BODY MASS INDEX: 30.25 KG/M2

## 2020-01-29 DIAGNOSIS — K74.60 CIRRHOSIS OF LIVER WITHOUT ASCITES, UNSPECIFIED HEPATIC CIRRHOSIS TYPE (HCC): Primary | ICD-10-CM

## 2020-01-29 DIAGNOSIS — K74.60 CIRRHOSIS OF LIVER WITHOUT ASCITES, UNSPECIFIED HEPATIC CIRRHOSIS TYPE (HCC): ICD-10-CM

## 2020-01-29 LAB
ALBUMIN SERPL-MCNC: 3.7 G/DL (ref 3.4–5)
ALBUMIN/GLOB SERPL: 1 {RATIO} (ref 0.8–1.7)
ALP SERPL-CCNC: 145 U/L (ref 45–117)
ALT SERPL-CCNC: 34 U/L (ref 13–56)
ANION GAP SERPL CALC-SCNC: 3 MMOL/L (ref 3–18)
AST SERPL-CCNC: 27 U/L (ref 10–38)
BASOPHILS # BLD: 0 K/UL (ref 0–0.1)
BASOPHILS NFR BLD: 0 % (ref 0–2)
BILIRUB DIRECT SERPL-MCNC: 0.2 MG/DL (ref 0–0.2)
BILIRUB SERPL-MCNC: 0.5 MG/DL (ref 0.2–1)
BUN SERPL-MCNC: 13 MG/DL (ref 7–18)
BUN/CREAT SERPL: 19 (ref 12–20)
CALCIUM SERPL-MCNC: 9.1 MG/DL (ref 8.5–10.1)
CHLORIDE SERPL-SCNC: 106 MMOL/L (ref 100–111)
CO2 SERPL-SCNC: 33 MMOL/L (ref 21–32)
CREAT SERPL-MCNC: 0.7 MG/DL (ref 0.6–1.3)
DIFFERENTIAL METHOD BLD: ABNORMAL
EOSINOPHIL # BLD: 0.1 K/UL (ref 0–0.4)
EOSINOPHIL NFR BLD: 4 % (ref 0–5)
ERYTHROCYTE [DISTWIDTH] IN BLOOD BY AUTOMATED COUNT: 15.6 % (ref 11.6–14.5)
GLOBULIN SER CALC-MCNC: 3.7 G/DL (ref 2–4)
GLUCOSE SERPL-MCNC: 128 MG/DL (ref 74–99)
HCT VFR BLD AUTO: 44 % (ref 35–45)
HGB BLD-MCNC: 13.9 G/DL (ref 12–16)
INR PPP: 1.1 (ref 0.8–1.2)
LYMPHOCYTES # BLD: 0.7 K/UL (ref 0.9–3.6)
LYMPHOCYTES NFR BLD: 23 % (ref 21–52)
MCH RBC QN AUTO: 29.1 PG (ref 24–34)
MCHC RBC AUTO-ENTMCNC: 31.6 G/DL (ref 31–37)
MCV RBC AUTO: 92.2 FL (ref 74–97)
MONOCYTES # BLD: 0.3 K/UL (ref 0.05–1.2)
MONOCYTES NFR BLD: 8 % (ref 3–10)
NEUTS SEG # BLD: 2.1 K/UL (ref 1.8–8)
NEUTS SEG NFR BLD: 65 % (ref 40–73)
PLATELET # BLD AUTO: 71 K/UL (ref 135–420)
PMV BLD AUTO: 11.9 FL (ref 9.2–11.8)
POTASSIUM SERPL-SCNC: 3.8 MMOL/L (ref 3.5–5.5)
PROT SERPL-MCNC: 7.4 G/DL (ref 6.4–8.2)
PROTHROMBIN TIME: 14.4 SEC (ref 11.5–15.2)
RBC # BLD AUTO: 4.77 M/UL (ref 4.2–5.3)
SODIUM SERPL-SCNC: 142 MMOL/L (ref 136–145)
WBC # BLD AUTO: 3.2 K/UL (ref 4.6–13.2)

## 2020-01-29 PROCEDURE — 85025 COMPLETE CBC W/AUTO DIFF WBC: CPT

## 2020-01-29 PROCEDURE — 80076 HEPATIC FUNCTION PANEL: CPT

## 2020-01-29 PROCEDURE — 85610 PROTHROMBIN TIME: CPT

## 2020-01-29 PROCEDURE — 36415 COLL VENOUS BLD VENIPUNCTURE: CPT

## 2020-01-29 PROCEDURE — 82107 ALPHA-FETOPROTEIN L3: CPT

## 2020-01-29 PROCEDURE — 80048 BASIC METABOLIC PNL TOTAL CA: CPT

## 2020-01-29 RX ORDER — ERYTHROMYCIN 5 MG/G
OINTMENT OPHTHALMIC
COMMUNITY
Start: 2020-01-09 | End: 2020-07-29 | Stop reason: ALTCHOICE

## 2020-01-29 NOTE — PROGRESS NOTES
[address] [place] [address], Giorgio FOUNTAIN Luverne Molder, MD Meridith Kaiser, PA-C Lindi Flank, [facility]     Elizabeth Camacho [facility]   GOSIA Dumont-MARYAM Dudley, [facility]       [address]    at [address], [address] [place] [address], [facility].    [phone]    FAX: [address] [place] [address], [address]    [phone]    FAX: [phone]         Patient Care Team:  Dolores Pearson MD as PCP - General (Family Practice)  Lolly Monet MD (Gastroenterology)  Louann Acosta MD (Urology)  Shivani eVra MD (Hematology and Oncology)  Wilian Quiroga MD (Endocrinology)  Colonel Edwin MD (Pulmonary Disease)      Problem List  Date Reviewed: 4/17/2019          Codes Class Noted    UTI (urinary tract infection) ICD-10-CM: N39.0  ICD-9-CM: 599.0  7/13/2018        Female cystocele ICD-10-CM: N81.10  ICD-9-CM: 618.01  3/9/2018        MÉNDEZ (nonalcoholic steatohepatitis) ICD-10-CM: K75.81  ICD-9-CM: 571.8  11/29/2014        Diabetes mellitus ([facility] ICD-10-CM: E11.9  ICD-9-CM: 250.00  11/5/2013        Gout ICD-10-CM: M10.9  ICD-9-CM: 274.9  11/5/2013        GERD (gastroesophageal reflux disease) ICD-10-CM: K21.9  ICD-9-CM: 530.81  11/5/2013        Hypothyroidism ICD-10-CM: E03.9  ICD-9-CM: 244.9  11/5/2013        Hypertension ICD-10-CM: I10  ICD-9-CM: 401.9  11/5/2013        S/P cholecystectomy ICD-10-CM: Z90.49  ICD-9-CM: V45.79  11/5/2013        S/P EULALIA (total abdominal hysterectomy) ICD-10-CM: Z90.710  ICD-9-CM: V88.01  11/5/2013              Madalyn Bah returns to the [place] [address] for monitoring of MÉNDEZ/cirrhosis.   The active problem list, all pertinent past medical history, medications, liver histology, endoscopic studies, radiologic findings and laboratory findings related to the liver disorder were reviewed with the patient. Serologic evaluation was positive for JAEL and ASMA. A liver biopsy performed in 2005 suggested MÉNDEZ with fibrosis. She had been on prednisone for macrobid induced pulmonary fibrosis. While on prednisone the liver enzymes normalized. When off prednisone the liver enzymes increased. She had a very high titer positive JAEL and ASMA. The possibility she had AIH and not MÉNDEZ was considered. Repeat liver biopsy in 8/2014 demonstrates MÉNDEZ with bridging fibrosis. Ultrasound imaging of the liver was performed in 04/2019. This demonstrates a 1.6 X 1.2 cm subcapsular mass in the right hepatic lobe. This was better characterized with dynamic MRI in 04/2019 and is classified as a LI-RADS 3 lesion. MRI was repeated in 07/2019. This remains a LI-RADS 3 lesion. The patient feels well and voiced no complaints today. She is caring for her ailing spouse. She is back to walking 2.5 miles a day after recovery from surgery of her prolapsed bladder. She was walking 4 to 6 miles a day prior to her surgery. She had lost about 25 pounds with her exercise regime. The patient has no complaints which can be attributed to liver disease. The patient completes all daily activities without any functional limitations. The patient has not experienced fatigue, fevers, chills, shortness of breath, chest pain, pain in the right side over the liver, diffuse abdominal pain, nausea, vomiting, constipation, diarrhrea, dry eyes, dry mouth, arthralgias, myalgias, yellowing of the eyes or skin, itching, dark urine, problems concentrating, swelling of the abdomen, swelling of the lower extremities, hematemesis, or hematochezia. Since the last office appointment the patient:  Had no changes in the liver disease.   Had basal cell carcinoma removed from right lower eye lid. Had reconstruction surgery. Eyelids sewn together for now. Opening it on 02/19/2020. Allergies   Allergen Reactions    Ciprofloxacin Itching     \"scalp burns and body itches\"    Macrobid [Nitrofurantoin Monohyd/M-Cryst] Other (comments)     Scarred lungs    Serotonin 5ht-3 Antagonists Other (comments)     fever    Sulfa (Sulfonamide Antibiotics) Hives    Tetanus Immune Globulin Swelling     Current Outpatient Medications   Medication Sig    vit B complex no.12/niacin,B3, (VITAMIN B COMPLEX NO.12-NIACIN PO) Take 1,000 mg by mouth.  glucose blood VI test strips (PRECISION XTRA TEST) strip Use to check bs 5x daily    GIGI PEN NEEDLE 32 gauge x 5/32\" ndle     cyanocobalamin 1,000 mcg tablet Take 1,000 mcg by mouth daily.  metoprolol succinate (TOPROL-XL) 100 mg tablet Take  by mouth daily.  atorvastatin (LIPITOR) 10 mg tablet Take  by mouth daily.  insulin lispro (HUMALOG) 100 unit/mL kwikpen by SubCUTAneous route Before breakfast, lunch, dinner and at bedtime. Sliding scale   Indications: TYPE 2 DIABETES MELLITUS    nitroglycerin (NITROSTAT) 0.4 mg SL tablet 0.4 mg by SubLINGual route every five (5) minutes as needed for Chest Pain.  allopurinol (ZYLOPRIM) 300 mg tablet Take  by mouth daily. Indications: GOUT    levothyroxine (SYNTHROID) 125 mcg tablet Take 125 mcg by mouth Daily (before breakfast). 125mcg Tuesday, W ednesday, Friday,Saturday, Sunday  62. 5mcg Monday, Thursday  Indications: HYPOTHYROIDISM    insulin detemir (LEVEMIR) 100 unit/mL (3 mL) pen 50 Units by SubCUTAneous route two (2) times a day. 20 units in the morning and 40 units at night. Indications: type 2 diabetes mellitus    omeprazole (PRILOSEC) 20 mg capsule Take 20 mg by mouth daily. Indications: GASTROESOPHAGEAL REFLUX     No current facility-administered medications for this visit. REVIEW OF SYSTEMS:  Constitution systems: Negative for fever, chills, weight gain, weight loss. Eyes: Negative for diplopia, visual changes, eye pain. ENT: Negative for sore throat, painful swallowing. Respiratory: Negative for cough, hemoptysis, dyspnea. Cardiology: Negative for chest pain, palpitations. GI:  Negative for constipation or diarrhea. : Negative for urinary frequency, dysuria and hematuria. Skin: Negative for rash. Hematology: Negative for easy bruising, bleeding from gums or nose. Musculo-skelatal: Negative for back pain, muscle pain, weakness. Neurologic: Negative for headaches, dizziness, vertigo, memory problems. Psychology: Negative for anxiety, depression. FAMILY/SOCIAL HISTORY:  The patient is . The patient has 3 children, and 10 grandchildren. The patient has never used tobacco products. The patient consumes 5 alcoholic beverages per week. 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 PHYSICAL EXAMINATION:    Visit Vitals  /72   Pulse 67   Temp 96.9 °F (36.1 °C) (Tympanic)   Ht 5' 2\" (1.575 m)   Wt 164 lb 6 oz (74.6 kg)   SpO2 97%   BMI 30.06 kg/m²       General: No acute distress. Eyes: Sclera anicteric. ENT: No oral lesions. Thyroid normal.  Nodes: No adenopathy. Skin: No spider angiomata. No jaundice. No palmar erythema. Respiratory: Lungs clear to auscultation. Cardiovascular: Regular heart rate. No murmurs. No JVD. Abdomen: Soft non-tender. Liver size normal to percussion/palpation. Spleen not palpable. No obvious ascites. Extremities: No edema. No muscle wasting. No gross arthritic changes. Neurologic: Alert and oriented. Cranial nerves grossly intact. No asterixsis.     LABORATORY STUDIES:  [place] [address] [address] & Units 1/29/2020 1/2/2020 4/17/2019   WBC 4.6 - 13.2 K/uL 3.2 (L)  3.8 (L)   ANC 1.8 - 8.0 K/UL 2.1  2.5   HGB 12.0 - 16.0 g/dL 13.9  14.6    - 420 K/uL 71 (L)  95 (L)   INR 0.8 - 1.2   1.1  1.1   AST 10 - 38 U/L 27  32   ALT 13 - 56 U/L 34  37   Alk Phos 45 - 117 U/L 145 (H)  66   Bili, Total 0.2 - 1.0 MG/DL 0.5  0.7   Bili, Direct 0.0 - 0.2 MG/DL 0.2  0.2   Albumin 3.4 - 5.0 g/dL 3.7  4.0   BUN 7.0 - 18 MG/DL 13  17   Creat 0.6 - 1.3 MG/DL 0.70  0.87   Creat (iSTAT) 0.6 - 1.3 MG/DL  0.8    Na 136 - 145 mmol/L 142  141   K 3.5 - 5.5 mmol/L 3.8  4.0   Cl 100 - 111 mmol/L 106  106   CO2 21 - 32 mmol/L 33 (H)  30   Glucose 74 - 99 mg/dL 128 (H)  108 (H)     Cancer Screening Latest Ref Rng & Units 1/29/2020 4/17/2019   AFP, Serum 0.0 - 8.0 ng/mL 1.6  2.2   AFP-L3% 0.0 - 9.9 % Comment  Comment     SEROLOGIES:  Serologies Latest Ref Rng 11/5/2013   Hep B Surface Ag Negative Negative   Hep B Core Ab, Total Negative Negative   Ferritin 15 - 150 ng/mL 213 (H)   Iron % Saturation 15 - 55 % 14 (L)   JAEL, IFA  See patterns   JAEL Pattern  1:1280 (H)   ASMCA 0 - 19 Units 35 (H)   Alpha-1 antitrypsin level 90 - 200 mg/dL 176     11/2013. Anti-HBsurface negative, anti-HCV negative. LIVER HISTOLOGY:  2005. MÉNDEZ with fibrosis  . 8/2014. Slides reviewed by MLS. MÉNDEZ with bridging fibrosis. 06/2017. TRANSIENT HEPATIC ELASTOGRAPHY:   E Range: 7.3-11.5 kPa  E Mean: 8.9 kPa  E Median: 8.7 kPa  E Std: 1.5 kPa    07/2018. TRANSIENT HEPATIC ELASTOGRAPHY:   E Range: 4.91-11.28 kPa  E Mean: 7.38 kPa  E Median: 7.17 kPa  E Std: 1.8 kPa    ENDOSCOPIC PROCEDURES:  Not available or performed    RADIOLOGY:  11/2013. Ultrasound of liver. Echogenic consistent with chronic liver disease. No liver mass lesions. No dilated bile ducts. No ascites. 10/2015: Ultrasound of liver. Echogenic consistent with chronic liver disease. No liver mass lesions. No dilated bile ducts. No ascites. 6/2016: Ultrasound of liver. Echogenic consistent with chronic liver disease. No liver mass lesions. No dilated bile ducts. No ascites. 06/2017. Ultrasound of the liver.   Evidence of chronic hepatocellular disease without suspicious mass 07/2018. Ultrasound of the liver. Heterogeneous and slightly coarse in echogenicity. There is incidental small anechoic 1.7 cm cyst in the left hepatic lobe. No evidence of solid mass. 04/2019. Ultrasound of the liver. Persistent sonographic findings of fibrosis from chronic hepatocellular changes and/or steatosis. Interval appearing anterior subcapsular right lobe of liver circumscribed indeterminate 1.8 cm hypoechoic lesion. 04/2019. Dynamic MRI of the liver. Hepatic steatosis with possible cirrhosis. Inferior right hepatic lobe nodule identified possibly corresponding with the hypoechoic nodule in the ultrasound. This is an LR-3 lesion. No arterial enhancing nodules or masses identified. 07/2019. Dynamic MRI of the abdomen. Small area of progressive nodular enhancement in the inferior right hepatic lobe, which does not enhance in the arterial phase, but demonstrates enhancement in the portal venous phase without washout, is unchanged in appearance since 4/12/2019. There is persistent nodular enhancement on the equilibrium phase imaging. No washout. LR-3.  01/2020. Dynamic MRI of the abdomen. Hepatic steatosis with possible cirrhosis. Inferior right hepatic lobe nodule which demonstrates portal venous enhancement which persists is unchanged in size or appearance since prior MRI from 04/12/2019. LR-3. -No new developing hepatic finding. OTHER TESTING:  Not available or performed    ASSESSMENT AND PLAN:  MÉNDEZ with bridging fibrosis/cirrhosis. Based on labs, the patient appears to have cirrhosis. Will perform laboratory testing to monitor liver function and degree of liver injury. This will include hepatic panel, a CBC w/ diff, a BMP, a PT/INR, and an AFP-L3%. We will monitor the right lobe LI-RADS 3 mass with MRI's for another year. If the mass does not change, then it is benign by definition. Repeat MRI was ordered today to be performed in 07/2020.   She will follow up with me shortly after the repeat imaging. The patient was counseled regarding diet and exercise to achieve weight loss. The best diet for patients with fatty liver is one very low in carbohydrates and enriched with protein such as an [address] program.      The patient underwent surgery for a prolapsed bladder is still recovering, but is back to walking 2.5 miles a day. Before the surgery, she had been walking 4 to 6 miles a day. Still caring for her . The patient was directed to continue all current medications at the current dosages. There are no contraindications for the patient to take any medications that are necessary for treatment of other medical issues including medications for diabetes mellitus and hypercholesterolemia. The patient was counseled regarding alcohol consumption and that this could contribute to fatty liver disease. Vaccination for viral hepatitis B is recommended since the patient has no serologic evidence of previous exposure or vaccination with immunity. The need for vaccination against viral hepatitis A will be assessed with serologic and instituted as appropriate. 25 minutes total time spent with this patient with more than 50% of this time spent counseling and coordinating care as described above. [address] in 6 months, shortly after completing the MRI.     GOSIA Mayberry-MARYAM  [facility]  [address] [facility], [address] [facility]   [address], [address] [facility]   [phone]

## 2020-01-30 LAB
AFP L3 MFR SERPL: NORMAL % (ref 0–9.9)
AFP SERPL-MCNC: 1.6 NG/ML (ref 0–8)

## 2020-07-06 ENCOUNTER — HOSPITAL ENCOUNTER (OUTPATIENT)
Dept: MRI IMAGING | Age: 80
Discharge: HOME | End: 2020-07-06
Payer: MEDICARE

## 2020-07-06 DIAGNOSIS — K74.60 CIRRHOSIS OF LIVER WITHOUT ASCITES, UNSPECIFIED HEPATIC CIRRHOSIS TYPE (HCC): ICD-10-CM

## 2020-07-06 LAB — CREAT UR-MCNC: 0.7 MG/DL (ref 0.6–1.3)

## 2020-07-06 PROCEDURE — A9575 INJ GADOTERATE MEGLUMI 0.1ML: HCPCS | Performed by: NURSE PRACTITIONER

## 2020-07-06 PROCEDURE — 74011636320 HC RX REV CODE- 636/320: Performed by: NURSE PRACTITIONER

## 2020-07-06 PROCEDURE — 82565 ASSAY OF CREATININE: CPT

## 2020-07-06 PROCEDURE — 74183 MRI ABD W/O CNTR FLWD CNTR: CPT

## 2020-07-06 RX ADMIN — GADOTERATE MEGLUMINE 15 ML: 376.9 INJECTION INTRAVENOUS at 11:13

## 2020-07-28 ENCOUNTER — TELEPHONE (OUTPATIENT)
Dept: HEMATOLOGY | Age: 80
End: 2020-07-28

## 2020-07-28 NOTE — TELEPHONE ENCOUNTER
Updated on recent MRI. No suspiciou hepatic masses. Compatible with cirrhosis. Follow up as scheduled.

## 2020-07-29 ENCOUNTER — HOSPITAL ENCOUNTER (OUTPATIENT)
Dept: LAB | Age: 80
Discharge: HOME | End: 2020-07-29
Payer: MEDICARE

## 2020-07-29 ENCOUNTER — OFFICE VISIT (OUTPATIENT)
Dept: HEMATOLOGY | Age: 80
End: 2020-07-29

## 2020-07-29 VITALS
OXYGEN SATURATION: 98 % | WEIGHT: 169.5 LBS | BODY MASS INDEX: 30.03 KG/M2 | TEMPERATURE: 95.3 F | SYSTOLIC BLOOD PRESSURE: 150 MMHG | DIASTOLIC BLOOD PRESSURE: 67 MMHG | HEART RATE: 68 BPM

## 2020-07-29 DIAGNOSIS — K74.60 CIRRHOSIS OF LIVER WITHOUT ASCITES, UNSPECIFIED HEPATIC CIRRHOSIS TYPE (HCC): ICD-10-CM

## 2020-07-29 DIAGNOSIS — K74.60 CIRRHOSIS OF LIVER WITHOUT ASCITES, UNSPECIFIED HEPATIC CIRRHOSIS TYPE (HCC): Primary | ICD-10-CM

## 2020-07-29 LAB
ALBUMIN SERPL-MCNC: 3.5 G/DL (ref 3.4–5)
ALBUMIN/GLOB SERPL: 1 {RATIO} (ref 0.8–1.7)
ALP SERPL-CCNC: 96 U/L (ref 45–117)
ALT SERPL-CCNC: 29 U/L (ref 13–56)
ANION GAP SERPL CALC-SCNC: 5 MMOL/L (ref 3–18)
AST SERPL-CCNC: 24 U/L (ref 10–38)
BASOPHILS # BLD: 0 K/UL (ref 0–0.1)
BASOPHILS NFR BLD: 1 % (ref 0–2)
BILIRUB DIRECT SERPL-MCNC: 0.2 MG/DL (ref 0–0.2)
BILIRUB SERPL-MCNC: 0.6 MG/DL (ref 0.2–1)
BUN SERPL-MCNC: 8 MG/DL (ref 7–18)
BUN/CREAT SERPL: 13 (ref 12–20)
CALCIUM SERPL-MCNC: 8.5 MG/DL (ref 8.5–10.1)
CHLORIDE SERPL-SCNC: 109 MMOL/L (ref 100–111)
CO2 SERPL-SCNC: 29 MMOL/L (ref 21–32)
CREAT SERPL-MCNC: 0.64 MG/DL (ref 0.6–1.3)
DIFFERENTIAL METHOD BLD: ABNORMAL
EOSINOPHIL # BLD: 0.1 K/UL (ref 0–0.4)
EOSINOPHIL NFR BLD: 3 % (ref 0–5)
ERYTHROCYTE [DISTWIDTH] IN BLOOD BY AUTOMATED COUNT: 16.1 % (ref 11.6–14.5)
GLOBULIN SER CALC-MCNC: 3.6 G/DL (ref 2–4)
GLUCOSE SERPL-MCNC: 90 MG/DL (ref 74–99)
HCT VFR BLD AUTO: 40.6 % (ref 35–45)
HGB BLD-MCNC: 12.2 G/DL (ref 12–16)
INR PPP: 1.2 (ref 0.8–1.2)
LYMPHOCYTES # BLD: 0.8 K/UL (ref 0.9–3.6)
LYMPHOCYTES NFR BLD: 24 % (ref 21–52)
MCH RBC QN AUTO: 27.5 PG (ref 24–34)
MCHC RBC AUTO-ENTMCNC: 30 G/DL (ref 31–37)
MCV RBC AUTO: 91.6 FL (ref 74–97)
MONOCYTES # BLD: 0.3 K/UL (ref 0.05–1.2)
MONOCYTES NFR BLD: 9 % (ref 3–10)
NEUTS SEG # BLD: 2.2 K/UL (ref 1.8–8)
NEUTS SEG NFR BLD: 63 % (ref 40–73)
PLATELET # BLD AUTO: 74 K/UL (ref 135–420)
PMV BLD AUTO: 11.9 FL (ref 9.2–11.8)
POTASSIUM SERPL-SCNC: 4.1 MMOL/L (ref 3.5–5.5)
PROT SERPL-MCNC: 7.1 G/DL (ref 6.4–8.2)
PROTHROMBIN TIME: 15.2 SEC (ref 11.5–15.2)
RBC # BLD AUTO: 4.43 M/UL (ref 4.2–5.3)
SODIUM SERPL-SCNC: 143 MMOL/L (ref 136–145)
WBC # BLD AUTO: 3.4 K/UL (ref 4.6–13.2)

## 2020-07-29 PROCEDURE — 80076 HEPATIC FUNCTION PANEL: CPT

## 2020-07-29 PROCEDURE — 85610 PROTHROMBIN TIME: CPT

## 2020-07-29 PROCEDURE — 80048 BASIC METABOLIC PNL TOTAL CA: CPT

## 2020-07-29 PROCEDURE — 85025 COMPLETE CBC W/AUTO DIFF WBC: CPT

## 2020-07-29 PROCEDURE — 82107 ALPHA-FETOPROTEIN L3: CPT

## 2020-07-29 PROCEDURE — 36415 COLL VENOUS BLD VENIPUNCTURE: CPT

## 2020-07-29 NOTE — PROGRESS NOTES
[address] [place] [address], MD Kerri Bird PA-C Marjory Churches, [facility]     Elizabeth Uptonworth, [facility]   GOSIA Jacobson-MARYAM Lordissa Chad [facility]       Simba Lino [facility] 136    at [address], [address] [place] [address], [facility] 22.    [phone]    FAX: [address] [place] [address], [address]    [phone]    FAX: [phone]         Patient Care Team:  Juan Antonio Rico MD as PCP - General (Family Medicine)  Koko Paul MD (Gastroenterology)  Cricket George MD (Urology)  Prabhjot Montes MD (Hematology and Oncology)  Latoya Soriano MD (Endocrinology)  Philip Tran MD (Pulmonary Disease)  Wendy Lombardi MD (Ophthalmology)  Hadley Zavaleta [place] (Dermatology)      Problem List  Date Reviewed: 4/17/2019          Codes Class Noted    UTI (urinary tract infection) ICD-10-CM: N39.0  ICD-9-CM: 599.0  7/13/2018        Female cystocele ICD-10-CM: N81.10  ICD-9-CM: 618.01  3/9/2018        MÉNDEZ (nonalcoholic steatohepatitis) ICD-10-CM: K75.81  ICD-9-CM: 571.8  11/29/2014        Diabetes mellitus ([facility] ICD-10-CM: E11.9  ICD-9-CM: 250.00  11/5/2013        Gout ICD-10-CM: M10.9  ICD-9-CM: 274.9  11/5/2013        GERD (gastroesophageal reflux disease) ICD-10-CM: K21.9  ICD-9-CM: 530.81  11/5/2013        Hypothyroidism ICD-10-CM: E03.9  ICD-9-CM: 244.9  11/5/2013        Hypertension ICD-10-CM: I10  ICD-9-CM: 401.9  11/5/2013        S/P cholecystectomy ICD-10-CM: Z90.49  ICD-9-CM: V45.79  11/5/2013        S/P EULALIA (total abdominal hysterectomy) ICD-10-CM: Z90.710  ICD-9-CM: V88.01  11/5/2013              Alexandr Donaldson returns to the [address] for monitoring of MÉNDEZ/cirrhosis. The active problem list, all pertinent past medical history, medications, liver histology, endoscopic studies, radiologic findings and laboratory findings related to the liver disorder were reviewed with the patient. Serologic evaluation was positive for JAEL and ASMA. A liver biopsy performed in 2005 suggested MÉNDEZ with fibrosis. She had been on prednisone for macrobid induced pulmonary fibrosis. While on prednisone the liver enzymes normalized. When off prednisone the liver enzymes increased. She had a very high titer positive JAEL and ASMA. The possibility she had AIH and not MÉNDEZ was considered. Repeat liver biopsy in 8/2014 demonstrates MÉNDEZ with bridging fibrosis. Ultrasound imaging of the liver was performed in 04/2019. This demonstrates a 1.6 X 1.2 cm subcapsular mass in the right hepatic lobe. This was better characterized with dynamic MRI in 04/2019 and is classified as a LI-RADS 3 lesion. MRI was repeated in 07/2020. This remains a LI-RADS 3 lesion. The patient feels well and voiced no complaints today. She is caring for her ailing spouse. Because of her 's dementia, the patient is unable to resume her previous high level of exercise and has gained weight. She had lost about 30 pounds previously on her exercise regime. The patient has no complaints which can be attributed to liver disease. The patient completes all daily activities without any functional limitations. The patient has not experienced fatigue, fevers, chills, shortness of breath, chest pain, pain in the right side over the liver, diffuse abdominal pain, nausea, vomiting, constipation, diarrhrea, dry eyes, dry mouth, arthralgias, myalgias, yellowing of the eyes or skin, itching, dark urine, problems concentrating, swelling of the abdomen, swelling of the lower extremities, hematemesis, or hematochezia.     Since the last office appointment the patient:  Had no changes in the liver disease. Allergies   Allergen Reactions    Ciprofloxacin Itching     \"scalp burns and body itches\"    Macrobid [Nitrofurantoin Monohyd/M-Cryst] Other (comments)     Scarred lungs    Serotonin 5ht-3 Antagonists Other (comments)     fever    Sulfa (Sulfonamide Antibiotics) Hives    Tetanus Immune Globulin Swelling     Current Outpatient Medications   Medication Sig    erythromycin (ILOTYCIN) ophthalmic ointment     vit B complex no.12/niacin,B3, (VITAMIN B COMPLEX NO.12-NIACIN PO) Take 1,000 mg by mouth.  glucose blood VI test strips (PRECISION XTRA TEST) strip Use to check bs 5x daily    GIGI PEN NEEDLE 32 gauge x 5/32\" ndle     cyanocobalamin 1,000 mcg tablet Take 1,000 mcg by mouth daily.  metoprolol succinate (TOPROL-XL) 100 mg tablet Take  by mouth daily.  atorvastatin (LIPITOR) 10 mg tablet Take  by mouth daily.  insulin lispro (HUMALOG) 100 unit/mL kwikpen by SubCUTAneous route Before breakfast, lunch, dinner and at bedtime. Sliding scale   Indications: TYPE 2 DIABETES MELLITUS    nitroglycerin (NITROSTAT) 0.4 mg SL tablet 0.4 mg by SubLINGual route every five (5) minutes as needed for Chest Pain.  allopurinol (ZYLOPRIM) 300 mg tablet Take  by mouth daily. Indications: GOUT    levothyroxine (SYNTHROID) 125 mcg tablet Take 125 mcg by mouth Daily (before breakfast). 125mcg Tuesday, W ednesday, Friday,Saturday, Sunday  62. 5mcg Monday, Thursday  Indications: HYPOTHYROIDISM    insulin detemir (LEVEMIR) 100 unit/mL (3 mL) pen 50 Units by SubCUTAneous route two (2) times a day. 20 units in the morning and 40 units at night. Indications: type 2 diabetes mellitus    omeprazole (PRILOSEC) 20 mg capsule Take 20 mg by mouth daily. Indications: GASTROESOPHAGEAL REFLUX     No current facility-administered medications for this visit. REVIEW OF SYSTEMS:  Constitution systems: Negative for fever, chills, weight gain, weight loss.    Eyes: Negative for diplopia, visual changes, eye pain. ENT: Negative for sore throat, painful swallowing. Respiratory: Negative for cough, hemoptysis, dyspnea. Cardiology: Negative for chest pain, palpitations. GI:  Negative for constipation or diarrhea. : Negative for urinary frequency, dysuria and hematuria. Skin: Negative for rash. Hematology: Negative for easy bruising, bleeding from gums or nose. Musculo-skelatal: Negative for back pain, muscle pain, weakness. Neurologic: Negative for headaches, dizziness, vertigo, memory problems. Psychology: Negative for anxiety, depression. FAMILY/SOCIAL HISTORY:  The patient is . The patient has 3 children, and 10 grandchildren. The patient has never used tobacco products. The patient consumes 5 alcoholic beverages per week. 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 PHYSICAL EXAMINATION:    Visit Vitals  /67   Pulse 68   Temp (!) 95.3 °F (35.2 °C) (Tympanic)   Wt 169 lb 8 oz (76.9 kg)   SpO2 98%   BMI 30.03 kg/m²       General: No acute distress. Eyes: Sclera anicteric. ENT: No oral lesions. Thyroid normal.  Nodes: No adenopathy. Skin: No spider angiomata. No jaundice. No palmar erythema. Respiratory: Lungs clear to auscultation. Cardiovascular: Regular heart rate. No murmurs. No JVD. Abdomen: Soft non-tender. Liver size normal to percussion/palpation. Spleen not palpable. No obvious ascites. Extremities: No edema. No muscle wasting. No gross arthritic changes. Neurologic: Alert and oriented. Cranial nerves grossly intact. No asterixsis.     LABORATORY STUDIES:  [facility] [place] of [address] [place] [address] & Units 1/29/2020 1/2/2020 4/17/2019   WBC 4.6 - 13.2 K/uL 3.2 (L)  3.8 (L)   ANC 1.8 - 8.0 K/UL 2.1  2.5   HGB 12.0 - 16.0 g/dL 13.9  14.6    - 420 K/uL 71 (L)  95 (L)   INR 0.8 - 1.2   1.1  1.1   AST 10 - 38 U/L 27  32   ALT 13 - 56 U/L 34  37   Alk Phos 45 - 117 U/L 145 (H)  66   Bili, Total 0.2 - 1.0 MG/DL 0.5  0.7   Bili, Direct 0.0 - 0.2 MG/DL 0.2  0.2   Albumin 3.4 - 5.0 g/dL 3.7  4.0   BUN 7.0 - 18 MG/DL 13  17   Creat 0.6 - 1.3 MG/DL 0.70  0.87   Creat (iSTAT) 0.6 - 1.3 MG/DL  0.8    Na 136 - 145 mmol/L 142  141   K 3.5 - 5.5 mmol/L 3.8  4.0   Cl 100 - 111 mmol/L 106  106   CO2 21 - 32 mmol/L 33 (H)  30   Glucose 74 - 99 mg/dL 128 (H)  108 (H)     Cancer Screening Latest Ref Rng & Units 1/29/2020 4/17/2019   AFP, Serum 0.0 - 8.0 ng/mL 1.6  2.2   AFP-L3% 0.0 - 9.9 % Comment  Comment     SEROLOGIES:  Serologies Latest Ref Rng 11/5/2013   Hep B Surface Ag Negative Negative   Hep B Core Ab, Total Negative Negative   Ferritin 15 - 150 ng/mL 213 (H)   Iron % Saturation 15 - 55 % 14 (L)   JAEL, IFA  See patterns   JAEL Pattern  1:1280 (H)   ASMCA 0 - 19 Units 35 (H)   Alpha-1 antitrypsin level 90 - 200 mg/dL 176     11/2013. Anti-HBsurface negative, anti-HCV negative. LIVER HISTOLOGY:  2005. MÉNDEZ with fibrosis  . 8/2014. Slides reviewed by MLS. MÉNDEZ with bridging fibrosis. 06/2017. TRANSIENT HEPATIC ELASTOGRAPHY:   E Range: 7.3-11.5 kPa  E Mean: 8.9 kPa  E Median: 8.7 kPa  E Std: 1.5 kPa    07/2018. TRANSIENT HEPATIC ELASTOGRAPHY:   E Range: 4.91-11.28 kPa  E Mean: 7.38 kPa  E Median: 7.17 kPa  E Std: 1.8 kPa    ENDOSCOPIC PROCEDURES:  Not available or performed    RADIOLOGY:  11/2013. Ultrasound of liver. Echogenic consistent with chronic liver disease. No liver mass lesions. No dilated bile ducts. No ascites. 10/2015: Ultrasound of liver. Echogenic consistent with chronic liver disease. No liver mass lesions. No dilated bile ducts. No ascites. 6/2016: Ultrasound of liver. Echogenic consistent with chronic liver disease. No liver mass lesions. No dilated bile ducts. No ascites. 06/2017. Ultrasound of the liver. Evidence of chronic hepatocellular disease without suspicious mass   07/2018. Ultrasound of the liver.  Heterogeneous and slightly coarse in echogenicity. There is incidental small anechoic 1.7 cm cyst in the left hepatic lobe. No evidence of solid mass. 04/2019. Ultrasound of the liver. Persistent sonographic findings of fibrosis from chronic hepatocellular changes and/or steatosis. Interval appearing anterior subcapsular right lobe of liver circumscribed indeterminate 1.8 cm hypoechoic lesion. 04/2019. Dynamic MRI of the liver. Hepatic steatosis with possible cirrhosis. Inferior right hepatic lobe nodule identified possibly corresponding with the hypoechoic nodule in the ultrasound. This is an LR-3 lesion. No arterial enhancing nodules or masses identified. 07/2019. Dynamic MRI of the abdomen. Small area of progressive nodular enhancement in the inferior right hepatic lobe, which does not enhance in the arterial phase, but demonstrates enhancement in the portal venous phase without washout, is unchanged in appearance since 4/12/2019. There is persistent nodular enhancement on the equilibrium phase imaging. No washout. LR-3.  01/2020. Dynamic MRI of the abdomen. Hepatic steatosis with possible cirrhosis. Inferior right hepatic lobe nodule which demonstrates portal venous enhancement which persists is unchanged in size or appearance since prior MRI from 04/12/2019. LR-3. -No new developing hepatic finding.  07/2020. Dynamic MRI of the abdomen. Small lesion in the inferior right hepatic lobe is similar in size without  highly suspicious features, LR-3.    OTHER TESTING:  Not available or performed    ASSESSMENT AND PLAN:  MÉNDEZ with bridging fibrosis/cirrhosis. Based on labs, the patient appears to have cirrhosis. Will perform laboratory testing to monitor liver function and degree of liver injury. This will include hepatic panel, a CBC w/ diff, a BMP, a PT/INR, and an AFP-L3%. We will monitor the right lobe LI-RADS 3 mass with MRI's for another year. If the mass does not change, then it is benign by definition.   Repeat MRI was ordered today to be performed in 01/2021. She will follow up with me shortly after the repeat imaging. The patient was counseled regarding diet and exercise to achieve weight loss. The best diet for patients with fatty liver is one very low in carbohydrates and enriched with protein such as an [address] program.      She is currently unable to exercise due to her spouse's vascular dementia. The patient was directed to continue all current medications at the current dosages. There are no contraindications for the patient to take any medications that are necessary for treatment of other medical issues including medications for diabetes mellitus and hypercholesterolemia. The patient was counseled regarding alcohol consumption and that this could contribute to fatty liver disease. Vaccination for viral hepatitis B is recommended since the patient has no serologic evidence of previous exposure or vaccination with immunity. The need for vaccination against viral hepatitis A will be assessed with serologic and instituted as appropriate. 25 minutes total time spent with this patient with more than 50% of this time spent counseling and coordinating care as described above. [address] in 6 months, shortly after completing the MRI.     GOSIA Reyes-MARYAM  [facility] [place] of [address], [address] [place] [address]   [phone]

## 2020-07-31 LAB
AFP L3 MFR SERPL: NORMAL % (ref 0–9.9)
AFP SERPL-MCNC: 1.7 NG/ML (ref 0–8)

## 2020-08-04 ENCOUNTER — TELEPHONE (OUTPATIENT)
Dept: HEMATOLOGY | Age: 80
End: 2020-08-04

## 2021-01-11 ENCOUNTER — HOSPITAL ENCOUNTER (OUTPATIENT)
Dept: MRI IMAGING | Age: 81
Discharge: HOME | End: 2021-01-11
Payer: MEDICARE

## 2021-01-11 VITALS — BODY MASS INDEX: 30.46 KG/M2 | WEIGHT: 171.96 LBS

## 2021-01-11 DIAGNOSIS — K74.60 CIRRHOSIS OF LIVER WITHOUT ASCITES, UNSPECIFIED HEPATIC CIRRHOSIS TYPE (HCC): ICD-10-CM

## 2021-01-11 LAB — CREAT UR-MCNC: 0.7 MG/DL (ref 0.6–1.3)

## 2021-01-11 PROCEDURE — 74183 MRI ABD W/O CNTR FLWD CNTR: CPT

## 2021-01-11 PROCEDURE — 82565 ASSAY OF CREATININE: CPT

## 2021-01-11 PROCEDURE — 74011636320 HC RX REV CODE- 636/320: Performed by: NURSE PRACTITIONER

## 2021-01-11 PROCEDURE — A9575 INJ GADOTERATE MEGLUMI 0.1ML: HCPCS | Performed by: NURSE PRACTITIONER

## 2021-01-11 RX ADMIN — GADOTERATE MEGLUMINE 15 ML: 376.9 INJECTION INTRAVENOUS at 10:41

## 2021-01-12 ENCOUNTER — TELEPHONE (OUTPATIENT)
Dept: HEMATOLOGY | Age: 81
End: 2021-01-12

## 2021-01-28 ENCOUNTER — HOSPITAL ENCOUNTER (OUTPATIENT)
Dept: LAB | Age: 81
Discharge: HOME | End: 2021-01-28
Payer: MEDICARE

## 2021-01-28 ENCOUNTER — OFFICE VISIT (OUTPATIENT)
Dept: HEMATOLOGY | Age: 81
End: 2021-01-28
Payer: MEDICARE

## 2021-01-28 VITALS
HEART RATE: 77 BPM | SYSTOLIC BLOOD PRESSURE: 162 MMHG | RESPIRATION RATE: 16 BRPM | HEIGHT: 62 IN | BODY MASS INDEX: 31.28 KG/M2 | WEIGHT: 170 LBS | TEMPERATURE: 96 F | OXYGEN SATURATION: 98 % | DIASTOLIC BLOOD PRESSURE: 73 MMHG

## 2021-01-28 DIAGNOSIS — K74.60 CIRRHOSIS OF LIVER WITHOUT ASCITES, UNSPECIFIED HEPATIC CIRRHOSIS TYPE (HCC): ICD-10-CM

## 2021-01-28 DIAGNOSIS — K74.60 CIRRHOSIS OF LIVER WITHOUT ASCITES, UNSPECIFIED HEPATIC CIRRHOSIS TYPE (HCC): Primary | ICD-10-CM

## 2021-01-28 LAB
ALBUMIN SERPL-MCNC: 3.4 G/DL (ref 3.4–5)
ALBUMIN/GLOB SERPL: 1 {RATIO} (ref 0.8–1.7)
ALP SERPL-CCNC: 141 U/L (ref 45–117)
ALT SERPL-CCNC: 43 U/L (ref 13–56)
ANION GAP SERPL CALC-SCNC: 3 MMOL/L (ref 3–18)
AST SERPL-CCNC: 39 U/L (ref 10–38)
BASOPHILS # BLD: 0 K/UL (ref 0–0.1)
BASOPHILS NFR BLD: 1 % (ref 0–2)
BILIRUB DIRECT SERPL-MCNC: 0.3 MG/DL (ref 0–0.2)
BILIRUB SERPL-MCNC: 0.7 MG/DL (ref 0.2–1)
BUN SERPL-MCNC: 11 MG/DL (ref 7–18)
BUN/CREAT SERPL: 15 (ref 12–20)
CALCIUM SERPL-MCNC: 8.5 MG/DL (ref 8.5–10.1)
CHLORIDE SERPL-SCNC: 107 MMOL/L (ref 100–111)
CO2 SERPL-SCNC: 32 MMOL/L (ref 21–32)
CREAT SERPL-MCNC: 0.73 MG/DL (ref 0.6–1.3)
DIFFERENTIAL METHOD BLD: ABNORMAL
EOSINOPHIL # BLD: 0.1 K/UL (ref 0–0.4)
EOSINOPHIL NFR BLD: 3 % (ref 0–5)
ERYTHROCYTE [DISTWIDTH] IN BLOOD BY AUTOMATED COUNT: 19.5 % (ref 11.6–14.5)
GLOBULIN SER CALC-MCNC: 3.5 G/DL (ref 2–4)
GLUCOSE SERPL-MCNC: 181 MG/DL (ref 74–99)
HCT VFR BLD AUTO: 43.4 % (ref 35–45)
HGB BLD-MCNC: 14.4 G/DL (ref 12–16)
INR PPP: 1.2 (ref 0.8–1.2)
LYMPHOCYTES # BLD: 0.7 K/UL (ref 0.9–3.6)
LYMPHOCYTES NFR BLD: 18 % (ref 21–52)
MCH RBC QN AUTO: 31.1 PG (ref 24–34)
MCHC RBC AUTO-ENTMCNC: 33.2 G/DL (ref 31–37)
MCV RBC AUTO: 93.7 FL (ref 74–97)
MONOCYTES # BLD: 0.4 K/UL (ref 0.05–1.2)
MONOCYTES NFR BLD: 10 % (ref 3–10)
NEUTS SEG # BLD: 2.5 K/UL (ref 1.8–8)
NEUTS SEG NFR BLD: 68 % (ref 40–73)
PLATELET # BLD AUTO: 62 K/UL (ref 135–420)
PLATELET COMMENTS,PCOM: ABNORMAL
PMV BLD AUTO: 11.5 FL (ref 9.2–11.8)
POTASSIUM SERPL-SCNC: 3.6 MMOL/L (ref 3.5–5.5)
PROT SERPL-MCNC: 6.9 G/DL (ref 6.4–8.2)
PROTHROMBIN TIME: 14.8 SEC (ref 11.5–15.2)
RBC # BLD AUTO: 4.63 M/UL (ref 4.2–5.3)
RBC MORPH BLD: ABNORMAL
SODIUM SERPL-SCNC: 142 MMOL/L (ref 136–145)
WBC # BLD AUTO: 3.7 K/UL (ref 4.6–13.2)

## 2021-01-28 PROCEDURE — 82107 ALPHA-FETOPROTEIN L3: CPT

## 2021-01-28 PROCEDURE — G8536 NO DOC ELDER MAL SCRN: HCPCS | Performed by: NURSE PRACTITIONER

## 2021-01-28 PROCEDURE — 36415 COLL VENOUS BLD VENIPUNCTURE: CPT

## 2021-01-28 PROCEDURE — 85025 COMPLETE CBC W/AUTO DIFF WBC: CPT

## 2021-01-28 PROCEDURE — G8427 DOCREV CUR MEDS BY ELIG CLIN: HCPCS | Performed by: NURSE PRACTITIONER

## 2021-01-28 PROCEDURE — 1090F PRES/ABSN URINE INCON ASSESS: CPT | Performed by: NURSE PRACTITIONER

## 2021-01-28 PROCEDURE — 85610 PROTHROMBIN TIME: CPT

## 2021-01-28 PROCEDURE — G8753 SYS BP > OR = 140: HCPCS | Performed by: NURSE PRACTITIONER

## 2021-01-28 PROCEDURE — 1101F PT FALLS ASSESS-DOCD LE1/YR: CPT | Performed by: NURSE PRACTITIONER

## 2021-01-28 PROCEDURE — G8400 PT W/DXA NO RESULTS DOC: HCPCS | Performed by: NURSE PRACTITIONER

## 2021-01-28 PROCEDURE — G8754 DIAS BP LESS 90: HCPCS | Performed by: NURSE PRACTITIONER

## 2021-01-28 PROCEDURE — G8432 DEP SCR NOT DOC, RNG: HCPCS | Performed by: NURSE PRACTITIONER

## 2021-01-28 PROCEDURE — 99213 OFFICE O/P EST LOW 20 MIN: CPT | Performed by: NURSE PRACTITIONER

## 2021-01-28 PROCEDURE — 80076 HEPATIC FUNCTION PANEL: CPT

## 2021-01-28 PROCEDURE — G8417 CALC BMI ABV UP PARAM F/U: HCPCS | Performed by: NURSE PRACTITIONER

## 2021-01-28 PROCEDURE — 80048 BASIC METABOLIC PNL TOTAL CA: CPT

## 2021-01-28 PROCEDURE — G0463 HOSPITAL OUTPT CLINIC VISIT: HCPCS | Performed by: NURSE PRACTITIONER

## 2021-01-28 RX ORDER — MONTELUKAST SODIUM 10 MG/1
10 TABLET ORAL DAILY
COMMUNITY

## 2021-01-28 NOTE — PROGRESS NOTES
Pending sale to [facility], Nancy FOUNTAIN Dulcie Peach, MD Sherle Birkenhead, PERLA Trivedi, [facility]     Elizabeth Camacho, [facility]   Demetrio Johnson P-MARYAM Reid, [facility]       [address]    at [address], [address] [place] [address], [facility].    [phone]    FAX: [address] [place] [address]    at [address], [address]    [phone]    FAX: [phone]         Patient Care Team:  Lacie Quezada MD as PCP - General (Family Medicine)  Brant Mendez MD (Gastroenterology)  Coral Moe MD (Urology)  Tani Gregory MD (Hematology and Oncology)  Cornelio Harris MD (Endocrinology)  Silvia Hernandez MD (Pulmonary Disease)  [place], [place] (Dermatology)  Cliff Jimenez MD (Cardiology)      Problem List  Date Reviewed: 1/28/2021          Codes Class Noted    UTI (urinary tract infection) ICD-10-CM: N39.0  ICD-9-CM: 599.0  7/13/2018        Female cystocele ICD-10-CM: N81.10  ICD-9-CM: 618.01  3/9/2018        MÉNDEZ (nonalcoholic steatohepatitis) ICD-10-CM: K75.81  ICD-9-CM: 571.8  11/29/2014        Diabetes mellitus ([facility] ICD-10-CM: E11.9  ICD-9-CM: 250.00  11/5/2013        Gout ICD-10-CM: M10.9  ICD-9-CM: 274.9  11/5/2013        GERD (gastroesophageal reflux disease) ICD-10-CM: K21.9  ICD-9-CM: 530.81  11/5/2013        Hypothyroidism ICD-10-CM: E03.9  ICD-9-CM: 244.9  11/5/2013        Hypertension ICD-10-CM: I10  ICD-9-CM: 401.9  11/5/2013        S/P cholecystectomy ICD-10-CM: Z90.49  ICD-9-CM: V45.79  11/5/2013        S/P EULALIA (total abdominal hysterectomy) ICD-10-CM: Z90.710  ICD-9-CM: V88.01  11/5/2013              [facility]idd [facility] for monitoring of MÉNDEZ/cirrhosis. The active problem list, all pertinent past medical history, medications, liver histology, endoscopic studies, radiologic findings and laboratory findings related to the liver disorder were reviewed with the patient. Serologic evaluation was positive for JAEL and ASMA. A liver biopsy performed in 2005 suggested MÉNDEZ with fibrosis. She had been on prednisone for macrobid induced pulmonary fibrosis. While on prednisone the liver enzymes normalized. When off prednisone the liver enzymes increased. She had a very high titer positive JAEL and ASMA. The possibility she had AIH and not MÉNDEZ was considered. Repeat liver biopsy in 8/2014 demonstrates MÉNDEZ with bridging fibrosis. Ultrasound imaging of the liver was performed in 04/2019. This demonstrates a 1.6 X 1.2 cm subcapsular mass in the right hepatic lobe. This was better characterized with dynamic MRI in 04/2019 and is classified as a LI-RADS 3 lesion. MRI was repeated in 07/2020 and 01/2021. This remains a LI-RADS 3 lesion. The patient feels well and voiced no complaints today. She is caring for her ailing spouse. Because of her 's dementia, the patient is unable to resume her previous high level of exercise and has gained weight. She had lost about 30 pounds previously on her exercise regime. The patient has no complaints which can be attributed to liver disease. The patient completes all daily activities without any functional limitations. The patient has not experienced fatigue, fevers, chills, shortness of breath, chest pain, pain in the right side over the liver, diffuse abdominal pain, nausea, vomiting, constipation, diarrhrea, dry eyes, dry mouth, arthralgias, myalgias, yellowing of the eyes or skin, itching, dark urine, problems concentrating, swelling of the abdomen, swelling of the lower extremities, hematemesis, or hematochezia.     Since the last office appointment the patient:  Had no changes in the liver disease. Under a lot of stress. Spouse has dementia and suffered a UTI. Was hospitalized for 10 days. Now in rehab. Had two iron infusions [facility], Dr. Marlena Bosch). Had repeat MRI with no changes. Scheduled for colonoscopy and EGD on 02/01/2021 to look for GI bleeding. Allergies   Allergen Reactions    Ciprofloxacin Itching     \"scalp burns and body itches\"    Macrobid [Nitrofurantoin Monohyd/M-Cryst] Other (comments)     Scarred lungs    Serotonin 5ht-3 Antagonists Other (comments)     fever    Sulfa (Sulfonamide Antibiotics) Hives    Tetanus Immune Globulin Swelling     Current Outpatient Medications   Medication Sig    montelukast (SINGULAIR) 10 mg tablet Take 10 mg by mouth daily.  vit B complex no.12/niacin,B3, (VITAMIN B COMPLEX NO.12-NIACIN PO) Take 1,000 mg by mouth.  glucose blood VI test strips (PRECISION XTRA TEST) strip Use to check bs 5x daily    GIGI PEN NEEDLE 32 gauge x 5/32\" ndle     metoprolol succinate (TOPROL-XL) 100 mg tablet Take  by mouth daily.  atorvastatin (LIPITOR) 10 mg tablet Take  by mouth daily.  insulin lispro (HUMALOG) 100 unit/mL kwikpen by SubCUTAneous route Before breakfast, lunch, dinner and at bedtime. Sliding scale   Indications: TYPE 2 DIABETES MELLITUS    nitroglycerin (NITROSTAT) 0.4 mg SL tablet 0.4 mg by SubLINGual route every five (5) minutes as needed for Chest Pain.  allopurinol (ZYLOPRIM) 300 mg tablet Take  by mouth daily. Indications: GOUT    levothyroxine (SYNTHROID) 125 mcg tablet Take 125 mcg by mouth Daily (before breakfast). 125mcg Tuesday, W ednesday, Friday,Saturday, Sunday  62. 5mcg Monday, Thursday  Indications: HYPOTHYROIDISM    insulin detemir (LEVEMIR) 100 unit/mL (3 mL) pen 50 Units by SubCUTAneous route two (2) times a day. 20 units in the morning and 40 units at night. Indications: type 2 diabetes mellitus    omeprazole (PRILOSEC) 20 mg capsule Take 20 mg by mouth daily.  Indications: GASTROESOPHAGEAL REFLUX     No current facility-administered medications for this visit. REVIEW OF SYSTEMS:  Constitution systems: Negative for fever, chills, weight gain, weight loss. Eyes: Negative for diplopia, visual changes, eye pain. ENT: Negative for sore throat, painful swallowing. Respiratory: Negative for cough, hemoptysis, dyspnea. Cardiology: Negative for chest pain, palpitations. GI:  Negative for constipation or diarrhea. : Negative for urinary frequency, dysuria and hematuria. Skin: Negative for rash. Hematology: Negative for easy bruising, bleeding from gums or nose. Musculo-skelatal: Negative for back pain, muscle pain, weakness. Neurologic: Negative for headaches, dizziness, vertigo, memory problems. Psychology: Negative for anxiety, depression. FAMILY/SOCIAL HISTORY:  The patient is . The patient has 3 children, and 10 grandchildren. The patient has never used tobacco products. The patient consumes 5 alcoholic beverages per week. 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 PHYSICAL EXAMINATION:    Visit Vitals  BP (!) 162/73 (BP 1 Location: Right upper arm, BP Patient Position: Sitting)   Pulse 77   Temp (!) 96 °F (35.6 °C)   Resp 16   Ht 5' 2\" (1.575 m)   Wt 170 lb (77.1 kg)   SpO2 98%   BMI 31.09 kg/m²       General: No acute distress. Eyes: Sclera anicteric. ENT: No oral lesions. Thyroid normal.  Nodes: No adenopathy. Skin: No spider angiomata. No jaundice. No palmar erythema. Respiratory: Lungs clear to auscultation. Cardiovascular: Regular heart rate. No murmurs. No JVD. Abdomen: Soft non-tender. Liver size normal to percussion/palpation. Spleen not palpable. No obvious ascites. Extremities: No edema. No muscle wasting. No gross arthritic changes. Neurologic: Alert and oriented. Cranial nerves grossly intact. No asterixsis.     LABORATORY STUDIES:  [facility] [place] [address] & Units 1/28/2021   WBC 4.6 - 13.2 K/uL 3.7 (L)   ANC 1.8 - 8.0 K/UL 2.5   HGB 12.0 - 16.0 g/dL 14.4    - 420 K/uL 62 (L)   INR 0.8 - 1.2   1.2   AST 10 - 38 U/L 39 (H)   ALT 13 - 56 U/L 43   Alk Phos 45 - 117 U/L 141 (H)   Bili, Total 0.2 - 1.0 MG/DL 0.7   Bili, Direct 0.0 - 0.2 MG/DL 0.3 (H)   Albumin 3.4 - 5.0 g/dL 3.4   BUN 7.0 - 18 MG/DL 11   Creat 0.6 - 1.3 MG/DL 0.73   Creat (iSTAT) 0.6 - 1.3 MG/DL    Na 136 - 145 mmol/L 142   K 3.5 - 5.5 mmol/L 3.6   Cl 100 - 111 mmol/L 107   CO2 21 - 32 mmol/L 32   Glucose 74 - 99 mg/dL 181 (H)     Liver [place] [facility] Latest Ref Rng & Units 7/29/2020   WBC 4.6 - 13.2 K/uL 3.4 (L)   ANC 1.8 - 8.0 K/UL 2.2   HGB 12.0 - 16.0 g/dL 12.2    - 420 K/uL 74 (L)   INR 0.8 - 1.2   1.2   AST 10 - 38 U/L 24   ALT 13 - 56 U/L 29   Alk Phos 45 - 117 U/L 96   Bili, Total 0.2 - 1.0 MG/DL 0.6   Bili, Direct 0.0 - 0.2 MG/DL 0.2   Albumin 3.4 - 5.0 g/dL 3.5   BUN 7.0 - 18 MG/DL 8   Creat 0.6 - 1.3 MG/DL 0.64   Creat (iSTAT) 0.6 - 1.3 MG/DL    Na 136 - 145 mmol/L 143   K 3.5 - 5.5 mmol/L 4.1   Cl 100 - 111 mmol/L 109   CO2 21 - 32 mmol/L 29   Glucose 74 - 99 mg/dL 90     Cancer Screening Latest Ref Rng & Units 1/28/2021 7/29/2020   AFP, Serum 0.0 - 8.0 ng/mL 2.1  1.7   AFP-L3% 0.0 - 9.9 % Comment  Comment     SEROLOGIES:  Serologies Latest Ref Rng 11/5/2013   Hep B Surface Ag Negative Negative   Hep B Core Ab, Total Negative Negative   Ferritin 15 - 150 ng/mL 213 (H)   Iron % Saturation 15 - 55 % 14 (L)   JAEL, IFA  See patterns   JAEL Pattern  1:1280 (H)   ASMCA 0 - 19 Units 35 (H)   Alpha-1 antitrypsin level 90 - 200 mg/dL 176     11/2013. Anti-HBsurface negative, anti-HCV negative. LIVER HISTOLOGY:  2005. MÉNDEZ with fibrosis  . 8/2014. Slides reviewed by CRISTIANO. MÉNDEZ with bridging fibrosis. 06/2017.   TRANSIENT HEPATIC ELASTOGRAPHY:   E Range: 7.3-11.5 kPa  E Mean: 8.9 kPa  E Median: 8.7 kPa  E Std: 1.5 kPa    07/2018. TRANSIENT HEPATIC ELASTOGRAPHY:   E Range: 4.91-11.28 kPa  E Mean: 7.38 kPa  E Median: 7.17 kPa  E Std: 1.8 kPa    ENDOSCOPIC PROCEDURES:  Not available or performed    RADIOLOGY:  11/2013. Ultrasound of liver. Echogenic consistent with chronic liver disease. No liver mass lesions. No dilated bile ducts. No ascites. 10/2015: Ultrasound of liver. Echogenic consistent with chronic liver disease. No liver mass lesions. No dilated bile ducts. No ascites. 6/2016: Ultrasound of liver. Echogenic consistent with chronic liver disease. No liver mass lesions. No dilated bile ducts. No ascites. 06/2017. Ultrasound of the liver. Evidence of chronic hepatocellular disease without suspicious mass   07/2018. Ultrasound of the liver. Heterogeneous and slightly coarse in echogenicity. There is incidental small anechoic 1.7 cm cyst in the left hepatic lobe. No evidence of solid mass. 04/2019. Ultrasound of the liver. Persistent sonographic findings of fibrosis from chronic hepatocellular changes and/or steatosis. Interval appearing anterior subcapsular right lobe of liver circumscribed indeterminate 1.8 cm hypoechoic lesion. 04/2019. Dynamic MRI of the liver. Hepatic steatosis with possible cirrhosis. Inferior right hepatic lobe nodule identified possibly corresponding with the hypoechoic nodule in the ultrasound. This is an LR-3 lesion. No arterial enhancing nodules or masses identified. 07/2019. Dynamic MRI of the abdomen. Small area of progressive nodular enhancement in the inferior right hepatic lobe, which does not enhance in the arterial phase, but demonstrates enhancement in the portal venous phase without washout, is unchanged in appearance since 4/12/2019. There is persistent nodular enhancement on the equilibrium phase imaging. No washout. LR-3.  01/2020. Dynamic MRI of the abdomen. Hepatic steatosis with possible cirrhosis.  Inferior right hepatic lobe nodule which demonstrates portal venous enhancement which persists is unchanged in size or appearance since prior MRI from 04/12/2019. LR-3. -No new developing hepatic finding.  07/2020. Dynamic MRI of the abdomen. Small lesion in the inferior right hepatic lobe is similar in size without highly suspicious features, LR-3.  01/2021. Dynamic MRI of the abdomen. Stable MR liver since prior exam 7-2020. Subcentimeter observation, segment VII subcapsular inferior right lobe liver, LI-RADS-    OTHER TESTING:  Not available or performed    ASSESSMENT AND PLAN:  MÉNDEZ with bridging fibrosis/cirrhosis. Based on labs, the patient appears to have cirrhosis. Will perform laboratory testing to monitor liver function and degree of liver injury. This will include hepatic panel, a CBC w/ diff, a BMP, a PT/INR, and an AFP-L3%. We will monitor the right lobe LI-RADS 3 mass with MRI's for 6 months. If the mass does not change, then it is benign by definition. Repeat MRI was ordered today to be performed in 07/2021. She will follow up with me shortly after the repeat imaging. The patient was counseled regarding diet and exercise to achieve weight loss. The best diet for patients with fatty liver is one very low in carbohydrates and enriched with protein such as an [address] program.      She is currently unable to exercise due to her spouse's vascular dementia. The patient was directed to continue all current medications at the current dosages. There are no contraindications for the patient to take any medications that are necessary for treatment of other medical issues including medications for diabetes mellitus and hypercholesterolemia. The patient was counseled regarding alcohol consumption and that this could contribute to fatty liver disease. Vaccination for viral hepatitis B is recommended since the patient has no serologic evidence of previous exposure or vaccination with immunity.     The need for vaccination against viral hepatitis A will be assessed with serologic and instituted as appropriate. 25 minutes total time spent with this patient with more than 50% of this time spent counseling and coordinating care as described above. [address] in 6 months, shortly after completing the MRI.     GOSIA Peck-C  [facility] [place] [facility]  [address], [address] [place] [address], [address] [place] [address]   [phone]

## 2021-01-29 LAB
AFP L3 MFR SERPL: NORMAL % (ref 0–9.9)
AFP SERPL-MCNC: 2.1 NG/ML (ref 0–8)

## 2021-07-21 ENCOUNTER — HOSPITAL ENCOUNTER (OUTPATIENT)
Dept: MRI IMAGING | Age: 81
Discharge: HOME | End: 2021-07-21
Payer: MEDICARE

## 2021-07-21 VITALS — BODY MASS INDEX: 31.09 KG/M2 | WEIGHT: 170 LBS

## 2021-07-21 DIAGNOSIS — K74.60 CIRRHOSIS OF LIVER WITHOUT ASCITES, UNSPECIFIED HEPATIC CIRRHOSIS TYPE (HCC): ICD-10-CM

## 2021-07-21 LAB — CREAT UR-MCNC: 0.7 MG/DL (ref 0.6–1.3)

## 2021-07-21 PROCEDURE — 74011636320 HC RX REV CODE- 636/320: Performed by: NURSE PRACTITIONER

## 2021-07-21 PROCEDURE — 82565 ASSAY OF CREATININE: CPT

## 2021-07-21 PROCEDURE — 74183 MRI ABD W/O CNTR FLWD CNTR: CPT

## 2021-07-21 PROCEDURE — A9576 INJ PROHANCE MULTIPACK: HCPCS | Performed by: NURSE PRACTITIONER

## 2021-07-21 RX ADMIN — GADOTERIDOL 15 ML: 279.3 INJECTION, SOLUTION INTRAVENOUS at 11:52

## 2021-07-28 ENCOUNTER — VIRTUAL VISIT (OUTPATIENT)
Dept: HEMATOLOGY | Age: 81
End: 2021-07-28
Payer: MEDICARE

## 2021-07-28 VITALS — WEIGHT: 152 LBS | BODY MASS INDEX: 26.93 KG/M2 | HEIGHT: 63 IN

## 2021-07-28 DIAGNOSIS — K74.60 CIRRHOSIS OF LIVER WITHOUT ASCITES, UNSPECIFIED HEPATIC CIRRHOSIS TYPE (HCC): Primary | ICD-10-CM

## 2021-07-28 PROCEDURE — G8432 DEP SCR NOT DOC, RNG: HCPCS | Performed by: NURSE PRACTITIONER

## 2021-07-28 PROCEDURE — G0463 HOSPITAL OUTPT CLINIC VISIT: HCPCS | Performed by: NURSE PRACTITIONER

## 2021-07-28 PROCEDURE — G8417 CALC BMI ABV UP PARAM F/U: HCPCS | Performed by: NURSE PRACTITIONER

## 2021-07-28 PROCEDURE — G8427 DOCREV CUR MEDS BY ELIG CLIN: HCPCS | Performed by: NURSE PRACTITIONER

## 2021-07-28 PROCEDURE — 1101F PT FALLS ASSESS-DOCD LE1/YR: CPT | Performed by: NURSE PRACTITIONER

## 2021-07-28 PROCEDURE — 1090F PRES/ABSN URINE INCON ASSESS: CPT | Performed by: NURSE PRACTITIONER

## 2021-07-28 PROCEDURE — G8400 PT W/DXA NO RESULTS DOC: HCPCS | Performed by: NURSE PRACTITIONER

## 2021-07-28 PROCEDURE — G8536 NO DOC ELDER MAL SCRN: HCPCS | Performed by: NURSE PRACTITIONER

## 2021-07-28 PROCEDURE — G8756 NO BP MEASURE DOC: HCPCS | Performed by: NURSE PRACTITIONER

## 2021-07-28 PROCEDURE — 99214 OFFICE O/P EST MOD 30 MIN: CPT | Performed by: NURSE PRACTITIONER

## 2021-07-28 RX ORDER — NADOLOL 40 MG/1
40 TABLET ORAL DAILY
COMMUNITY
Start: 2021-03-09 | End: 2021-09-05

## 2021-07-28 NOTE — PROGRESS NOTES
Piyush Aden MD, Marie Garces MD Brice Rough, PA-MARYAM Alexander, [facility]     Elizabeth Camacho, [facility]   Cathy Tran, [facility]    Angelina Ceballos, [facility]       [address]    at [address], [address] [place] [address] Ole Baker  22.    [phone]    FAX: [address] [place] [address], [address]    [phone]    FAX: [phone]         Patient Care Team:  Latisha Overton MD as PCP - General (Family Medicine)  Tobias Schafer MD (Gastroenterology)  Ben Teague MD (Urology)  Gloria Kim MD (Hematology and Oncology)  Ata Bartholomew MD (Endocrinology)  Lisette Sykes MD (Pulmonary Disease)  Robert Tello [place] (Dermatology)  Stefany Hoffman MD (Cardiology)      Problem List  Date Reviewed: 1/28/2021        Codes Class Noted    UTI (urinary tract infection) ICD-10-CM: N39.0  ICD-9-CM: 599.0  7/13/2018        Female cystocele ICD-10-CM: N81.10  ICD-9-CM: 618.01  3/9/2018        MÉNDEZ (nonalcoholic steatohepatitis) ICD-10-CM: K75.81  ICD-9-CM: 571.8  11/29/2014        Diabetes mellitus ([facility] ICD-10-CM: E11.9  ICD-9-CM: 250.00  11/5/2013        Gout ICD-10-CM: M10.9  ICD-9-CM: 274.9  11/5/2013        GERD (gastroesophageal reflux disease) ICD-10-CM: K21.9  ICD-9-CM: 530.81  11/5/2013        Hypothyroidism ICD-10-CM: E03.9  ICD-9-CM: 244.9  11/5/2013        Hypertension ICD-10-CM: I10  ICD-9-CM: 401.9  11/5/2013        S/P cholecystectomy ICD-10-CM: Z90.49  ICD-9-CM: V45.79  11/5/2013        S/P EULALIA (total abdominal hysterectomy) ICD-10-CM: Z90.710  ICD-9-CM: V88.01  11/5/2013              VIRTUAL TELEHEALTH VISIT PERFORMED DUE TO COVID-19 EPIDEMIC    CONSENT:  Kenton Castellano Jose, who was seen by synchronous, real-time, audio-video technology, and/or her healthcare decision maker, is aware that this patient-initiated, Telehealth encounter on 7/28/2021 is a billable service, with coverage as determined by her insurance carrier. She is aware that she may receive a bill and has provided verbal consent to proceed. This patient was evaluated during a Virtual Telehealth visit. A caregiver was present if appropriate. Due to this being a TeleHealth encounter performed during the TVYKQ-72 public health emergency, the physical examination was limited to that listed in the [address] was seen today via virtual visit at the [place] [address] of [facility] for monitoring of MÉNDEZ/cirrhosis. The active problem list, all pertinent past medical history, medications, liver histology, endoscopic studies, radiologic findings and laboratory findings related to the liver disorder were reviewed with the patient. Serologic evaluation was positive for JAEL and ASMA. A liver biopsy performed in 2005 suggested MÉNDEZ with fibrosis. She had been on prednisone for macrobid induced pulmonary fibrosis. While on prednisone the liver enzymes normalized. When off prednisone the liver enzymes increased. She had a very high titer positive JAEL and ASMA. The possibility she had AIH and not MÉNDEZ was considered. Repeat liver biopsy in 8/2014 demonstrates MÉNDEZ with bridging fibrosis. Ultrasound imaging of the liver was performed in 04/2019. This demonstrates a 1.6 X 1.2 cm subcapsular mass in the right hepatic lobe. This was better characterized with dynamic MRI in 04/2019 and is classified as a LI-RADS 3 lesion. MRI was repeated in 07/2020 and 01/2021. This remains a LI-RADS 3 lesion. The patient feels well and voiced no complaints today. She is caring for her ailing spouse.   Because of her 's dementia, the patient is unable to resume her previous high level of exercise and has gained weight. She had lost about 30 pounds previously on her exercise regime. The patient has no complaints which can be attributed to liver disease. The patient completes all daily activities without any functional limitations. The patient has not experienced fatigue, fevers, chills, shortness of breath, chest pain, pain in the right side over the liver, diffuse abdominal pain, nausea, vomiting, constipation, diarrhrea, dry eyes, dry mouth, arthralgias, myalgias, yellowing of the eyes or skin, itching, dark urine, problems concentrating, swelling of the abdomen, swelling of the lower extremities, hematemesis, or hematochezia. Since the last office appointment the patient:  Had no changes in the liver disease. Under a lot of stress. Spouse has dementia. Spouse now in SNF secondary to either CVA or dementia. Mrs. Garcia has lost 18 pounds since the beginning of the year. ASSESSMENT AND PLAN:  MÉNDEZ with bridging fibrosis/cirrhosis. Based on labs, the patient appears to have cirrhosis. Will perform laboratory testing to monitor liver function and degree of liver injury. This will include hepatic panel, a CBC w/ diff, a BMP, a PT/INR, and an AFP-L3%. We have been following a LI-RADS 3 mass in the right lobe with MRI every 6 months for 2 years. No change in mass. The mass is benign by definition. Will switch imaging back to ultrasound every 6 [facility]. She will follow up with me shortly after the repeat imaging. The patient was counseled regarding diet and exercise to achieve weight loss. The best diet for patients with fatty liver is one very low in carbohydrates and enriched with protein such as an [address] program.      She is currently unable to exercise due to her spouse's vascular dementia. The patient was directed to continue all current medications at the current dosages.   There are no contraindications for the patient to take any medications that are necessary for treatment of other medical issues including medications for diabetes mellitus and hypercholesterolemia. The patient was counseled regarding alcohol consumption and that this could contribute to fatty liver disease. Vaccination for viral hepatitis B is recommended since the patient has no serologic evidence of previous exposure or vaccination with immunity. The need for vaccination against viral hepatitis A will be assessed with serologic and instituted as appropriate. All of the above issues were discussed with the patient. All questions were answered. The patient expressed a clear understanding of the above. Allergies   Allergen Reactions    Ciprofloxacin Itching     \"scalp burns and body itches\"    Macrobid [Nitrofurantoin Monohyd/M-Cryst] Other (comments)     Scarred lungs    Serotonin 5ht-3 Antagonists Other (comments)     fever    Sulfa (Sulfonamide Antibiotics) Hives    Tetanus Immune Globulin Swelling     Current Outpatient Medications   Medication Sig    montelukast (SINGULAIR) 10 mg tablet Take 10 mg by mouth daily.  vit B complex no.12/niacin,B3, (VITAMIN B COMPLEX NO.12-NIACIN PO) Take 1,000 mg by mouth.  glucose blood VI test strips (PRECISION XTRA TEST) strip Use to check bs 5x daily    GIGI PEN NEEDLE 32 gauge x 5/32\" ndle     metoprolol succinate (TOPROL-XL) 100 mg tablet Take  by mouth daily.  atorvastatin (LIPITOR) 10 mg tablet Take  by mouth daily.  insulin lispro (HUMALOG) 100 unit/mL kwikpen by SubCUTAneous route Before breakfast, lunch, dinner and at bedtime. Sliding scale   Indications: TYPE 2 DIABETES MELLITUS    nitroglycerin (NITROSTAT) 0.4 mg SL tablet 0.4 mg by SubLINGual route every five (5) minutes as needed for Chest Pain.  allopurinol (ZYLOPRIM) 300 mg tablet Take  by mouth daily. Indications: GOUT    levothyroxine (SYNTHROID) 125 mcg tablet Take 125 mcg by mouth Daily (before breakfast).  125mcg Tuesday, W panfilonesdaySis Otts, Sunday  62. 5mcg Monday, Thursday  Indications: HYPOTHYROIDISM    insulin detemir (LEVEMIR) 100 unit/mL (3 mL) pen 50 Units by SubCUTAneous route two (2) times a day. 20 units in the morning and 40 units at night. Indications: type 2 diabetes mellitus    omeprazole (PRILOSEC) 20 mg capsule Take 20 mg by mouth daily. Indications: GASTROESOPHAGEAL REFLUX     No current facility-administered medications for this visit. REVIEW OF SYSTEMS:  Constitution systems: Negative for fever, chills, weight gain, weight loss. Eyes: Negative for diplopia, visual changes, eye pain. ENT: Negative for sore throat, painful swallowing. Respiratory: Negative for cough, hemoptysis, dyspnea. Cardiology: Negative for chest pain, palpitations. GI:  Negative for constipation or diarrhea. : Negative for urinary frequency, dysuria and hematuria. Skin: Negative for rash. Hematology: Negative for easy bruising, bleeding from gums or nose. Musculo-skelatal: Negative for back pain, muscle pain, weakness. Neurologic: Negative for headaches, dizziness, vertigo, memory problems. Psychology: Negative for anxiety, depression. FAMILY/SOCIAL HISTORY:  The patient is . The patient has 3 children, and 10 grandchildren. The patient has never used tobacco products. The patient consumes 5 alcoholic beverages per week. 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 PHYSICAL EXAMINATION:    There were no vitals taken for this visit. General: No acute distress. Eyes: Sclera anicteric. ENT: No oral lesions. Thyroid normal.  Nodes: No adenopathy. Skin: No spider angiomata. No jaundice. No palmar erythema. Respiratory: Lungs clear to auscultation. Cardiovascular: Regular heart rate. No murmurs. No JVD. Abdomen: Soft non-tender. Liver size normal to percussion/palpation. Spleen not palpable.  No obvious ascites. Extremities: No edema. No muscle wasting. No gross arthritic changes. Neurologic: Alert and oriented. Cranial nerves grossly intact. No asterixsis. LABORATORY STUDIES:  [address] & Units 1/28/2021   WBC 4.6 - 13.2 K/uL 3.7 (L)   ANC 1.8 - 8.0 K/UL 2.5   HGB 12.0 - 16.0 g/dL 14.4    - 420 K/uL 62 (L)   INR 0.8 - 1.2   1.2   AST 10 - 38 U/L 39 (H)   ALT 13 - 56 U/L 43   Alk Phos 45 - 117 U/L 141 (H)   Bili, Total 0.2 - 1.0 MG/DL 0.7   Bili, Direct 0.0 - 0.2 MG/DL 0.3 (H)   Albumin 3.4 - 5.0 g/dL 3.4   BUN 7.0 - 18 MG/DL 11   Creat 0.6 - 1.3 MG/DL 0.73   Creat (iSTAT) 0.6 - 1.3 MG/DL    Na 136 - 145 mmol/L 142   K 3.5 - 5.5 mmol/L 3.6   Cl 100 - 111 mmol/L 107   CO2 21 - 32 mmol/L 32   Glucose 74 - 99 mg/dL 181 (H)     Liver [facility] Latest Ref Rng & Units 7/29/2020   WBC 4.6 - 13.2 K/uL 3.4 (L)   ANC 1.8 - 8.0 K/UL 2.2   HGB 12.0 - 16.0 g/dL 12.2    - 420 K/uL 74 (L)   INR 0.8 - 1.2   1.2   AST 10 - 38 U/L 24   ALT 13 - 56 U/L 29   Alk Phos 45 - 117 U/L 96   Bili, Total 0.2 - 1.0 MG/DL 0.6   Bili, Direct 0.0 - 0.2 MG/DL 0.2   Albumin 3.4 - 5.0 g/dL 3.5   BUN 7.0 - 18 MG/DL 8   Creat 0.6 - 1.3 MG/DL 0.64   Creat (iSTAT) 0.6 - 1.3 MG/DL    Na 136 - 145 mmol/L 143   K 3.5 - 5.5 mmol/L 4.1   Cl 100 - 111 mmol/L 109   CO2 21 - 32 mmol/L 29   Glucose 74 - 99 mg/dL 90     Cancer Screening Latest Ref Rng & Units 1/28/2021 7/29/2020   AFP, Serum 0.0 - 8.0 ng/mL 2.1  1.7   AFP-L3% 0.0 - 9.9 % Comment  Comment     SEROLOGIES:  Serologies Latest Ref Rng 11/5/2013   Hep B Surface Ag Negative Negative   Hep B Core Ab, Total Negative Negative   Ferritin 15 - 150 ng/mL 213 (H)   Iron % Saturation 15 - 55 % 14 (L)   JAEL, IFA  See patterns   JAEL Pattern  1:1280 (H)   ASMCA 0 - 19 Units 35 (H)   Alpha-1 antitrypsin level 90 - 200 mg/dL 176     11/2013. Anti-HBsurface negative, anti-HCV negative. LIVER HISTOLOGY:  2005. MÉNDEZ with fibrosis      8/2014. Slides reviewed by MLS. MÉNDEZ with bridging fibrosis. 06/2017. TRANSIENT HEPATIC ELASTOGRAPHY:   E Range: 7.3-11.5 kPa  E Mean: 8.9 kPa  E Median: 8.7 kPa  E Std: 1.5 kPa    07/2018. TRANSIENT HEPATIC ELASTOGRAPHY:   E Range: 4.91-11.28 kPa  E Mean: 7.38 kPa  E Median: 7.17 kPa  E Std: 1.8 kPa    ENDOSCOPIC PROCEDURES:  02/2021. EGD by Dr. Ran Hughes. Report not available. RADIOLOGY:  11/2013. Ultrasound of liver. Echogenic consistent with chronic liver disease. No liver mass lesions. No dilated bile ducts. No ascites. 10/2015: Ultrasound of liver. Echogenic consistent with chronic liver disease. No liver mass lesions. No dilated bile ducts. No ascites. 6/2016: Ultrasound of liver. Echogenic consistent with chronic liver disease. No liver mass lesions. No dilated bile ducts. No ascites. 06/2017. Ultrasound of the liver. Evidence of chronic hepatocellular disease without suspicious mass   07/2018. Ultrasound of the liver. Heterogeneous and slightly coarse in echogenicity. There is incidental small anechoic 1.7 cm cyst in the left hepatic lobe. No evidence of solid mass. 04/2019. Ultrasound of the liver. Persistent sonographic findings of fibrosis from chronic hepatocellular changes and/or steatosis. Interval appearing anterior subcapsular right lobe of liver circumscribed indeterminate 1.8 cm hypoechoic lesion. 04/2019. Dynamic MRI of the liver. Hepatic steatosis with possible cirrhosis. Inferior right hepatic lobe nodule identified possibly corresponding with the hypoechoic nodule in the ultrasound. This is an LR-3 lesion. No arterial enhancing nodules or masses identified. 07/2019. Dynamic MRI of the abdomen. Small area of progressive nodular enhancement in the inferior right hepatic lobe, which does not enhance in the arterial phase, but demonstrates enhancement in the portal venous phase without washout, is unchanged in appearance since 4/12/2019.  There is persistent nodular enhancement on the equilibrium phase imaging. No washout. LR-3.  01/2020. Dynamic MRI of the abdomen. Hepatic steatosis with possible cirrhosis. Inferior right hepatic lobe nodule which demonstrates portal venous enhancement which persists is unchanged in size or appearance since prior MRI from 04/12/2019. LR-3. -No new developing hepatic finding.  07/2020. Dynamic MRI of the abdomen. Small lesion in the inferior right hepatic lobe is similar in size without highly suspicious features, LR-3.  01/2021. Dynamic MRI of the abdomen. Stable MR liver since prior exam 7-2020. Subcentimeter observation, segment VII subcapsular inferior right lobe liver, LI-RADS-3.  07/2021. Dynamic MRI of the abdomen. Small lesion in the inferior right hepatic lobe described previously is increasingly inconspicuous. LR-2. No new worrisome liver lesions. Liver mildly atrophied and lobulated consistent with cirrhosis. OTHER TESTING:  Not available or performed    FOLLOW-UP AFTER VIRTUAL VISIT:  Pursuant to the emergency declaration under the [facility], [facility] waiver authority and the [employer] Act, this Virtual  Visit was conducted, with the patient's (and/or their legal guardian's) consent, to reduce the patient's risk of exposure to COVID-19 and provide necessary medical care. Services were provided through a video synchronous discussion virtually to substitute for an in-person clinic visit. The patient was located in their home. The provider was located in the [place] [address] office. All of the issues listed above in the Assessment and Plan were discussed with the patient. All questions were answered. The patient expressed a clear understanding of the above. Because of the COVID-19 epidemic a follow-up appointment will be performed via TeleHealth in 6 months.     LIZA Givens  Liver [place] of [address], [address] [place] [address] Alie Em, [address]   [phone]

## 2021-07-29 ENCOUNTER — TELEPHONE (OUTPATIENT)
Dept: HEMATOLOGY | Age: 81
End: 2021-07-29

## 2021-07-29 NOTE — TELEPHONE ENCOUNTER
Notation purposes only:    Lab orders from GLENN Carroll were faxed over to Dr Tex Weston office for patient to have drawn today at her appointment. Called over to confirm with nurse to make sure orders are drawn while she is in the office. Nurse states she is unsure if that's allowed, because \"that makes them responsible for those labs\". Explained to nurse as long as we receive results we'll give her results and make sure f/u info is provided to patient afterwards. Nurse states she will see if it's allowed after receive orders from here. Confirmed faxed number.  Received confirmation after sending orders over to [phone]

## 2021-07-30 LAB
AFP L3 MFR SERPL: NORMAL % (ref 0–9.9)
AFP SERPL-MCNC: 1.2 NG/ML (ref 0–8)
ALBUMIN SERPL-MCNC: 3.5 G/DL (ref 3.7–4.7)
ALP SERPL-CCNC: 131 IU/L (ref 48–121)
ALT SERPL-CCNC: 22 IU/L (ref 0–32)
AST SERPL-CCNC: 34 IU/L (ref 0–40)
BASOPHILS # BLD AUTO: 0 X10E3/UL (ref 0–0.2)
BASOPHILS NFR BLD AUTO: 1 %
BILIRUB DIRECT SERPL-MCNC: 0.2 MG/DL (ref 0–0.4)
BILIRUB SERPL-MCNC: 0.6 MG/DL (ref 0–1.2)
BUN SERPL-MCNC: 11 MG/DL (ref 8–27)
BUN/CREAT SERPL: 17 (ref 12–28)
CALCIUM SERPL-MCNC: 8.7 MG/DL (ref 8.7–10.3)
CHLORIDE SERPL-SCNC: 106 MMOL/L (ref 96–106)
CO2 SERPL-SCNC: 25 MMOL/L (ref 20–29)
CREAT SERPL-MCNC: 0.63 MG/DL (ref 0.57–1)
EOSINOPHIL # BLD AUTO: 0.1 X10E3/UL (ref 0–0.4)
EOSINOPHIL NFR BLD AUTO: 3 %
ERYTHROCYTE [DISTWIDTH] IN BLOOD BY AUTOMATED COUNT: 14 % (ref 11.7–15.4)
GLUCOSE SERPL-MCNC: 134 MG/DL (ref 65–99)
HCT VFR BLD AUTO: 38.5 % (ref 34–46.6)
HGB BLD-MCNC: 12.6 G/DL (ref 11.1–15.9)
IMM GRANULOCYTES # BLD AUTO: 0 X10E3/UL (ref 0–0.1)
IMM GRANULOCYTES NFR BLD AUTO: 0 %
INR PPP: 1.1 (ref 0.9–1.2)
LYMPHOCYTES # BLD AUTO: 0.8 X10E3/UL (ref 0.7–3.1)
LYMPHOCYTES NFR BLD AUTO: 21 %
MCH RBC QN AUTO: 30.2 PG (ref 26.6–33)
MCHC RBC AUTO-ENTMCNC: 32.7 G/DL (ref 31.5–35.7)
MCV RBC AUTO: 92 FL (ref 79–97)
MONOCYTES # BLD AUTO: 0.3 X10E3/UL (ref 0.1–0.9)
MONOCYTES NFR BLD AUTO: 8 %
MORPHOLOGY BLD-IMP: ABNORMAL
NEUTROPHILS # BLD AUTO: 2.7 X10E3/UL (ref 1.4–7)
NEUTROPHILS NFR BLD AUTO: 67 %
PLATELET # BLD AUTO: 74 X10E3/UL (ref 150–450)
POTASSIUM SERPL-SCNC: 3.9 MMOL/L (ref 3.5–5.2)
PROT SERPL-MCNC: 6.7 G/DL (ref 6–8.5)
PROTHROMBIN TIME: 11.6 SEC (ref 9.1–12)
RBC # BLD AUTO: 4.17 X10E6/UL (ref 3.77–5.28)
SODIUM SERPL-SCNC: 142 MMOL/L (ref 134–144)
SPECIMEN STATUS REPORT, ROLRST: NORMAL
WBC # BLD AUTO: 4 X10E3/UL (ref 3.4–10.8)

## 2022-01-04 ENCOUNTER — HOSPITAL ENCOUNTER (OUTPATIENT)
Dept: ULTRASOUND IMAGING | Age: 82
Discharge: HOME | End: 2022-01-04
Payer: MEDICARE

## 2022-01-04 DIAGNOSIS — K74.60 CIRRHOSIS OF LIVER WITHOUT ASCITES, UNSPECIFIED HEPATIC CIRRHOSIS TYPE (HCC): ICD-10-CM

## 2022-01-04 PROCEDURE — 76705 ECHO EXAM OF ABDOMEN: CPT

## 2022-01-06 ENCOUNTER — VIRTUAL VISIT (OUTPATIENT)
Dept: HEMATOLOGY | Age: 82
End: 2022-01-06
Payer: MEDICARE

## 2022-01-06 DIAGNOSIS — K74.60 CIRRHOSIS OF LIVER WITHOUT ASCITES, UNSPECIFIED HEPATIC CIRRHOSIS TYPE (HCC): Primary | ICD-10-CM

## 2022-01-06 PROCEDURE — G8536 NO DOC ELDER MAL SCRN: HCPCS | Performed by: NURSE PRACTITIONER

## 2022-01-06 PROCEDURE — 99214 OFFICE O/P EST MOD 30 MIN: CPT | Performed by: NURSE PRACTITIONER

## 2022-01-06 PROCEDURE — G8427 DOCREV CUR MEDS BY ELIG CLIN: HCPCS | Performed by: NURSE PRACTITIONER

## 2022-01-06 PROCEDURE — G8432 DEP SCR NOT DOC, RNG: HCPCS | Performed by: NURSE PRACTITIONER

## 2022-01-06 PROCEDURE — G8756 NO BP MEASURE DOC: HCPCS | Performed by: NURSE PRACTITIONER

## 2022-01-06 PROCEDURE — G8417 CALC BMI ABV UP PARAM F/U: HCPCS | Performed by: NURSE PRACTITIONER

## 2022-01-06 PROCEDURE — 1090F PRES/ABSN URINE INCON ASSESS: CPT | Performed by: NURSE PRACTITIONER

## 2022-01-06 PROCEDURE — 1101F PT FALLS ASSESS-DOCD LE1/YR: CPT | Performed by: NURSE PRACTITIONER

## 2022-01-06 PROCEDURE — G8400 PT W/DXA NO RESULTS DOC: HCPCS | Performed by: NURSE PRACTITIONER

## 2022-01-06 PROCEDURE — G0463 HOSPITAL OUTPT CLINIC VISIT: HCPCS | Performed by: NURSE PRACTITIONER

## 2022-01-06 NOTE — PROGRESS NOTES
[facility], Nadia FOUNTAIN [place] Ike, MD Jerral Clutter, PA-MARYAM Jaffe, [facility]     Elizabeth KUMAR Doug, [facility]   Asif Alonso FNP-MARYAM Hamilton, [facility]       Simba Lino [facility] 136    at [address], [address] [place] [address], [facility] 22.    [phone]    FAX: [address] [place] [address], [address]    [phone]    FAX: [phone]         Patient Care Team:  Mary Tafoya MD as PCP - General (Family Medicine)  Lacie Wilkinson MD (Gastroenterology)  Chante Chavez MD (Urology)  Bernie Blake MD (Hematology and Oncology)  Saul Silva MD (Endocrinology)  Khadar Mack MD (Pulmonary Disease)  [facility], [address] [place] [address] Jesusita (Dermatology)  Allie Ruby MD (Cardiology)      Problem List  Date Reviewed: 7/28/2021          Codes Class Noted    UTI (urinary tract infection) ICD-10-CM: N39.0  ICD-9-CM: 599.0  7/13/2018        Female cystocele ICD-10-CM: N81.10  ICD-9-CM: 618.01  3/9/2018        MÉNDEZ (nonalcoholic steatohepatitis) ICD-10-CM: K75.81  ICD-9-CM: 571.8  11/29/2014        Diabetes mellitus ([facility] ICD-10-CM: E11.9  ICD-9-CM: 250.00  11/5/2013        Gout ICD-10-CM: M10.9  ICD-9-CM: 274.9  11/5/2013        GERD (gastroesophageal reflux disease) ICD-10-CM: K21.9  ICD-9-CM: 530.81  11/5/2013        Hypothyroidism ICD-10-CM: E03.9  ICD-9-CM: 244.9  11/5/2013        Hypertension ICD-10-CM: I10  ICD-9-CM: 401.9  11/5/2013        S/P cholecystectomy ICD-10-CM: Z90.49  ICD-9-CM: V45.79  11/5/2013        S/P EULALIA (total abdominal hysterectomy) ICD-10-CM: Z90.710  ICD-9-CM: V88.01  11/5/2013              VIRTUAL TELEHEALTH VISIT PERFORMED DUE TO COVID-19 EPIDEMIC    CONSENT:  Edwin Tenorio Jose, who was seen by synchronous, real-time, audio-video technology, and/or her healthcare decision maker, is aware that this patient-initiated, Telehealth encounter on 1/6/2022 is a billable service, with coverage as determined by her insurance carrier. She is aware that she may receive a bill and has provided verbal consent to proceed. This patient was evaluated during a Virtual Telehealth visit. A caregiver was present if appropriate. Due to this being a TeleHealth encounter performed during the [facility]-10 public health emergency, the physical examination was limited to that listed in the [address] was seen today via virtual visit at the [facility] for monitoring of MÉNDEZ/cirrhosis. The active problem list, all pertinent past medical history, medications, liver histology, endoscopic studies, radiologic findings and laboratory findings related to the liver disorder were reviewed with the patient. Serologic evaluation was positive for JAEL and ASMA. A liver biopsy performed in 2005 suggested MÉNDEZ with fibrosis. She had been on prednisone for macrobid induced pulmonary fibrosis. While on prednisone the liver enzymes normalized. When off prednisone the liver enzymes increased. She had a very high titer positive JAEL and ASMA. The possibility she had AIH and not MÉNDEZ was considered. Repeat liver biopsy in 8/2014 demonstrates MÉNDEZ with bridging fibrosis. Ultrasound imaging of the liver was performed in 04/2019. This demonstrates a 1.6 X 1.2 cm subcapsular mass in the right hepatic lobe. This was better characterized with dynamic MRI in 04/2019 and is classified as a LI-RADS 3 lesion. MRI was repeated in 07/2020, 01/2021, and 07/2021. Now LI-RADS 2. The patient feels well and voiced no complaints today. The patient has no complaints which can be attributed to liver disease.     The patient completes all daily activities without any functional limitations. The patient has not experienced fatigue, fevers, chills, shortness of breath, chest pain, pain in the right side over the liver, diffuse abdominal pain, nausea, vomiting, constipation, diarrhrea, dry eyes, dry mouth, arthralgias, myalgias, yellowing of the eyes or skin, itching, dark urine, problems concentrating, swelling of the abdomen, swelling of the lower extremities, hematemesis, or hematochezia. Since the last office appointment the patient:  Had no changes in the liver disease. Spouse passed away in 10/2021. Weight stable. ASSESSMENT AND PLAN:  MÉNDEZ with bridging fibrosis/cirrhosis. Based on labs, the patient appears to have cirrhosis. Will perform laboratory testing to monitor liver function and degree of liver injury. This will include hepatic panel, a CBC w/ diff, a BMP, a PT/INR, and an AFP-L3%. We have been following a LI-RADS 3 mass in the right lobe with MRI every 6 months for 2 years. No change in mass. The mass is benign by definition. Will switched imaging back to ultrasound every 6 months for [facility] 75. surveillance. The patient was counseled regarding diet and exercise to achieve weight loss. The best diet for patients with fatty liver is one very low in carbohydrates and enriched with protein such as an [address] program.      She is currently unable to exercise due to her spouse's vascular dementia. The patient was directed to continue all current medications at the current dosages. There are no contraindications for the patient to take any medications that are necessary for treatment of other medical issues including medications for diabetes mellitus and hypercholesterolemia. The patient was counseled regarding alcohol consumption and that this could contribute to fatty liver disease. Vaccination for viral hepatitis B is recommended since the patient has no serologic evidence of previous exposure or vaccination with immunity.     The need for vaccination against viral hepatitis A will be assessed with serologic and instituted as appropriate. All of the above issues were discussed with the patient. All questions were answered. The patient expressed a clear understanding of the above. Allergies   Allergen Reactions    Ciprofloxacin Itching     \"scalp burns and body itches\"    Macrobid [Nitrofurantoin Monohyd/M-Cryst] Other (comments)     Scarred lungs    Serotonin 5ht-3 Antagonists Other (comments)     fever    Sulfa (Sulfonamide Antibiotics) Hives    Tetanus Immune Globulin Swelling     Current Outpatient Medications   Medication Sig    montelukast (SINGULAIR) 10 mg tablet Take 10 mg by mouth daily.  vit B complex no.12/niacin,B3, (VITAMIN B COMPLEX NO.12-NIACIN PO) Take 1,000 mg by mouth.  glucose blood VI test strips (PRECISION XTRA TEST) strip Use to check bs 5x daily    GIGI PEN NEEDLE 32 gauge x 5/32\" ndle     metoprolol succinate (TOPROL-XL) 100 mg tablet Take  by mouth daily.  atorvastatin (LIPITOR) 10 mg tablet Take  by mouth daily.  insulin lispro (HUMALOG) 100 unit/mL kwikpen by SubCUTAneous route Before breakfast, lunch, dinner and at bedtime. Sliding scale   Indications: TYPE 2 DIABETES MELLITUS    nitroglycerin (NITROSTAT) 0.4 mg SL tablet 0.4 mg by SubLINGual route every five (5) minutes as needed for Chest Pain.  allopurinol (ZYLOPRIM) 300 mg tablet Take  by mouth daily. Indications: GOUT    levothyroxine (SYNTHROID) 125 mcg tablet Take 125 mcg by mouth Daily (before breakfast). 125mcg Tuesday, W ednesday, Friday,Saturday, Sunday  62. 5mcg Monday, Thursday  Indications: HYPOTHYROIDISM    insulin detemir (LEVEMIR) 100 unit/mL (3 mL) pen 50 Units by SubCUTAneous route two (2) times a day. 20 units in the morning and 40 units at night. Indications: type 2 diabetes mellitus    omeprazole (PRILOSEC) 20 mg capsule Take 20 mg by mouth daily.  Indications: GASTROESOPHAGEAL REFLUX     No current facility-administered medications for this visit. REVIEW OF SYSTEMS:  Constitution systems: Negative for fever, chills, weight gain, weight loss. Eyes: Negative for diplopia, visual changes, eye pain. ENT: Negative for sore throat, painful swallowing. Respiratory: Negative for cough, hemoptysis, dyspnea. Cardiology: Negative for chest pain, palpitations. GI:  Negative for constipation or diarrhea. : Negative for urinary frequency, dysuria and hematuria. Skin: Negative for rash. Hematology: Negative for easy bruising, bleeding from gums or nose. Musculo-skelatal: Negative for back pain, muscle pain, weakness. Neurologic: Negative for headaches, dizziness, vertigo, memory problems. Psychology: Negative for anxiety, depression. FAMILY/SOCIAL HISTORY:  The patient is . The patient has 3 children, and 10 grandchildren. The patient has never used tobacco products. The patient consumes 5 alcoholic beverages per week. 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 PHYSICAL EXAMINATION:    There were no vitals taken for this visit. General: No acute distress. Eyes: Sclera anicteric. ENT: No oral lesions. Thyroid normal.  Nodes: No adenopathy. Skin: No spider angiomata. No jaundice. No palmar erythema. Respiratory: Lungs clear to auscultation. Cardiovascular: Regular heart rate. No murmurs. No JVD. Abdomen: Soft non-tender. Liver size normal to percussion/palpation. Spleen not palpable. No obvious ascites. Extremities: No edema. No muscle wasting. No gross arthritic changes. Neurologic: Alert and oriented. Cranial nerves grossly intact. No asterixsis.     LABORATORY STUDIES:  [facility] [place] of [address] [place] [address] & Units 7/29/2021   WBC 3.4 - 10.8 x10E3/uL 4.0   ANC 1.4 - 7.0 x10E3/uL 2.7   HGB 11.1 - 15.9 g/dL 12.6    - 450 x10E3/uL 74 (LL)   INR 0.9 - 1.2 1.1   AST 0 - 40 IU/L 34   ALT 0 - 32 IU/L 22   Alk Phos 48 - 121 IU/L 131 (H)   Bili, Total 0.0 - 1.2 mg/dL 0.6   Bili, Direct 0.00 - 0.40 mg/dL 0.20   Albumin 3.7 - 4.7 g/dL 3.5 (L)   BUN 8 - 27 mg/dL 11   Creat 0.57 - 1.00 mg/dL 0.63   Creat (iSTAT) 0.6 - 1.3 MG/DL    Na 134 - 144 mmol/L 142   K 3.5 - 5.2 mmol/L 3.9   Cl 96 - 106 mmol/L 106   CO2 20 - 29 mmol/L 25   Glucose 65 - 99 mg/dL 134 (H)     Cancer Screening Latest Ref Rng & Units 7/29/2021 1/28/2021 7/29/2020   AFP, Serum 0.0 - 8.0 ng/mL 1.2 2.1 1.7   AFP-L3% 0.0 - 9.9 % Comment Comment Comment     SEROLOGIES:  Serologies Latest Ref Rng 11/5/2013   Hep B Surface Ag Negative Negative   Hep B Core Ab, Total Negative Negative   Ferritin 15 - 150 ng/mL 213 (H)   Iron % Saturation 15 - 55 % 14 (L)   JAEL, IFA  See patterns   JAEL Pattern  1:1280 (H)   ASMCA 0 - 19 Units 35 (H)   Alpha-1 antitrypsin level 90 - 200 mg/dL 176     11/2013. Anti-HBsurface negative, anti-HCV negative. LIVER HISTOLOGY:  2005. MÉNDEZ with fibrosis      8/2014. Slides reviewed by MLS. MÉNDEZ with bridging fibrosis. 06/2017. TRANSIENT HEPATIC ELASTOGRAPHY:   E Range: 7.3-11.5 kPa  E Mean: 8.9 kPa  E Median: 8.7 kPa  E Std: 1.5 kPa    07/2018. TRANSIENT HEPATIC ELASTOGRAPHY:   E Range: 4.91-11.28 kPa  E Mean: 7.38 kPa  E Median: 7.17 kPa  E Std: 1.8 kPa    ENDOSCOPIC PROCEDURES:  02/2021. EGD by Dr. Richard Aguirre. Report not available. RADIOLOGY:  11/2013. Ultrasound of liver. Echogenic consistent with chronic liver disease. No liver mass lesions. No dilated bile ducts. No ascites. 10/2015: Ultrasound of liver. Echogenic consistent with chronic liver disease. No liver mass lesions. No dilated bile ducts. No ascites. 6/2016: Ultrasound of liver. Echogenic consistent with chronic liver disease. No liver mass lesions. No dilated bile ducts. No ascites. 06/2017. Ultrasound of the liver. Evidence of chronic hepatocellular disease without suspicious mass   07/2018.   Ultrasound of the liver. Heterogeneous and slightly coarse in echogenicity. There is incidental small anechoic 1.7 cm cyst in the left hepatic lobe. No evidence of solid mass. 04/2019. Ultrasound of the liver. Persistent sonographic findings of fibrosis from chronic hepatocellular changes and/or steatosis. Interval appearing anterior subcapsular right lobe of liver circumscribed indeterminate 1.8 cm hypoechoic lesion. 04/2019. Dynamic MRI of the liver. Hepatic steatosis with possible cirrhosis. Inferior right hepatic lobe nodule identified possibly corresponding with the hypoechoic nodule in the ultrasound. This is an LR-3 lesion. No arterial enhancing nodules or masses identified. 07/2019. Dynamic MRI of the abdomen. Small area of progressive nodular enhancement in the inferior right hepatic lobe, which does not enhance in the arterial phase, but demonstrates enhancement in the portal venous phase without washout, is unchanged in appearance since 4/12/2019. There is persistent nodular enhancement on the equilibrium phase imaging. No washout. LR-3.  01/2020. Dynamic MRI of the abdomen. Hepatic steatosis with possible cirrhosis. Inferior right hepatic lobe nodule which demonstrates portal venous enhancement which persists is unchanged in size or appearance since prior MRI from 04/12/2019. LR-3. -No new developing hepatic finding.  07/2020. Dynamic MRI of the abdomen. Small lesion in the inferior right hepatic lobe is similar in size without highly suspicious features, LR-3.  01/2021. Dynamic MRI of the abdomen. Stable MR liver since prior exam 7-2020. Subcentimeter observation, segment VII subcapsular inferior right lobe liver, LI-RADS-3.  07/2021. Dynamic MRI of the abdomen. Small lesion in the inferior right hepatic lobe described previously is increasingly inconspicuous. LR-2. No new worrisome liver lesions. Liver mildly atrophied and lobulated consistent with cirrhosis. 01/2022. Ultrasound of the liver. Cirrhosis. Right hepatic lobe 2.0 x 1.4 cm hypoechoic lesion. OTHER TESTING:  Not available or performed    FOLLOW-UP AFTER VIRTUAL VISIT:  Pursuant to the emergency declaration under the [facility], [facility] waiver authority and the [employer] Act, this Virtual  Visit was conducted, with the patient's (and/or their legal guardian's) consent, to reduce the patient's risk of exposure to COVID-19 and provide necessary medical care. Services were provided through a video synchronous discussion virtually to substitute for an in-person clinic visit. The patient was located in their home. The provider was located in the [facility] office. All of the issues listed above in the Assessment and Plan were discussed with the patient. All questions were answered. The patient expressed a clear understanding of the above. Because of the COVID-19 epidemic a follow-up appointment will be performed via TeleHealth in 6 months.     LIZA Deras  Liver [place] of [address]   [phone]

## 2022-03-19 PROBLEM — N39.0 UTI (URINARY TRACT INFECTION): Status: ACTIVE | Noted: 2018-07-13

## 2022-03-19 PROBLEM — N81.10 FEMALE CYSTOCELE: Status: ACTIVE | Noted: 2018-03-09

## 2022-07-05 ENCOUNTER — HOSPITAL ENCOUNTER (OUTPATIENT)
Dept: ULTRASOUND IMAGING | Age: 82
Discharge: HOME | End: 2022-07-05
Payer: MEDICARE

## 2022-07-05 DIAGNOSIS — K74.60 CIRRHOSIS OF LIVER WITHOUT ASCITES, UNSPECIFIED HEPATIC CIRRHOSIS TYPE (HCC): ICD-10-CM

## 2022-07-05 PROCEDURE — 76705 ECHO EXAM OF ABDOMEN: CPT

## 2022-07-06 DIAGNOSIS — K74.60 CIRRHOSIS OF LIVER WITHOUT ASCITES, UNSPECIFIED HEPATIC CIRRHOSIS TYPE (HCC): Primary | ICD-10-CM

## 2022-07-12 ENCOUNTER — HOSPITAL ENCOUNTER (OUTPATIENT)
Dept: MRI IMAGING | Age: 82
Discharge: HOME | End: 2022-07-12
Payer: MEDICARE

## 2022-07-12 VITALS — BODY MASS INDEX: 27.1 KG/M2 | WEIGHT: 153 LBS

## 2022-07-12 DIAGNOSIS — K74.60 CIRRHOSIS OF LIVER WITHOUT ASCITES, UNSPECIFIED HEPATIC CIRRHOSIS TYPE (HCC): ICD-10-CM

## 2022-07-12 LAB — CREAT UR-MCNC: 0.6 MG/DL (ref 0.6–1.3)

## 2022-07-12 PROCEDURE — 74183 MRI ABD W/O CNTR FLWD CNTR: CPT

## 2022-07-12 PROCEDURE — 74011250636 HC RX REV CODE- 250/636: Performed by: NURSE PRACTITIONER

## 2022-07-12 PROCEDURE — 82565 ASSAY OF CREATININE: CPT

## 2022-07-12 PROCEDURE — A9577 INJ MULTIHANCE: HCPCS | Performed by: NURSE PRACTITIONER

## 2022-07-12 RX ADMIN — GADOBENATE DIMEGLUMINE 15 ML: 529 INJECTION, SOLUTION INTRAVENOUS at 09:33

## 2022-10-20 ENCOUNTER — OFFICE VISIT (OUTPATIENT)
Dept: HEMATOLOGY | Age: 82
End: 2022-10-20
Payer: MEDICARE

## 2022-10-20 VITALS
OXYGEN SATURATION: 98 % | SYSTOLIC BLOOD PRESSURE: 138 MMHG | BODY MASS INDEX: 27.29 KG/M2 | HEART RATE: 71 BPM | TEMPERATURE: 97.1 F | HEIGHT: 63 IN | WEIGHT: 154 LBS | DIASTOLIC BLOOD PRESSURE: 71 MMHG

## 2022-10-20 DIAGNOSIS — K74.60 CIRRHOSIS OF LIVER WITHOUT ASCITES, UNSPECIFIED HEPATIC CIRRHOSIS TYPE (HCC): Primary | ICD-10-CM

## 2022-10-20 PROCEDURE — G8752 SYS BP LESS 140: HCPCS | Performed by: NURSE PRACTITIONER

## 2022-10-20 PROCEDURE — 99214 OFFICE O/P EST MOD 30 MIN: CPT | Performed by: NURSE PRACTITIONER

## 2022-10-20 PROCEDURE — 1090F PRES/ABSN URINE INCON ASSESS: CPT | Performed by: NURSE PRACTITIONER

## 2022-10-20 PROCEDURE — G8417 CALC BMI ABV UP PARAM F/U: HCPCS | Performed by: NURSE PRACTITIONER

## 2022-10-20 PROCEDURE — 1123F ACP DISCUSS/DSCN MKR DOCD: CPT | Performed by: NURSE PRACTITIONER

## 2022-10-20 PROCEDURE — G8427 DOCREV CUR MEDS BY ELIG CLIN: HCPCS | Performed by: NURSE PRACTITIONER

## 2022-10-20 PROCEDURE — G8754 DIAS BP LESS 90: HCPCS | Performed by: NURSE PRACTITIONER

## 2022-10-20 PROCEDURE — G8432 DEP SCR NOT DOC, RNG: HCPCS | Performed by: NURSE PRACTITIONER

## 2022-10-20 PROCEDURE — G8400 PT W/DXA NO RESULTS DOC: HCPCS | Performed by: NURSE PRACTITIONER

## 2022-10-20 PROCEDURE — G8536 NO DOC ELDER MAL SCRN: HCPCS | Performed by: NURSE PRACTITIONER

## 2022-10-20 PROCEDURE — 1101F PT FALLS ASSESS-DOCD LE1/YR: CPT | Performed by: NURSE PRACTITIONER

## 2022-10-20 PROCEDURE — G0463 HOSPITAL OUTPT CLINIC VISIT: HCPCS | Performed by: NURSE PRACTITIONER

## 2022-10-20 PROCEDURE — 3078F DIAST BP <80 MM HG: CPT | Performed by: NURSE PRACTITIONER

## 2022-10-20 PROCEDURE — 3074F SYST BP LT 130 MM HG: CPT | Performed by: NURSE PRACTITIONER

## 2022-10-20 RX ORDER — CHOLECALCIFEROL (VITAMIN D3) 125 MCG
2000 CAPSULE ORAL DAILY
COMMUNITY
Start: 2022-09-12 | End: 2023-09-12

## 2022-10-20 RX ORDER — METFORMIN HYDROCHLORIDE 500 MG/1
TABLET ORAL 2 TIMES DAILY WITH MEALS
COMMUNITY

## 2022-10-20 RX ORDER — NADOLOL 40 MG/1
TABLET ORAL
COMMUNITY
Start: 2022-08-03

## 2022-10-20 NOTE — PROGRESS NOTES
[address] \[facility]\"", Shweta FOUNTAIN, [facility], [place]      Roselle Sandhoff, PA-C April S Ashworth, [facility], [facility]   Kym Ruby, GOSIA-C Shauna Olszewski, FNP-MARYAM Broderick, [facility]       Simba [address]    at [address], [facility]  22.    [phone]    FAX: [facility]    at [address], [address]    [phone]    FAX: [phone]         Patient Care Team:  Celestina Parks MD as PCP - General (Family Medicine)  Kenton Quinteros MD (Urology)  Geeta Gomez MD (Hematology and Oncology)  Samaria Issa MD (Pulmonary Disease)  [place], [place] (Dermatology Physician)  Mckenzie Woodward MD (Cardiovascular Disease Physician)      Problem List  Date Reviewed: 10/20/2022            Codes Class Noted    UTI (urinary tract infection) ICD-10-CM: N39.0  ICD-9-CM: 599.0  7/13/2018        Female cystocele ICD-10-CM: N81.10  ICD-9-CM: 618.01  3/9/2018        MÉNDEZ (nonalcoholic steatohepatitis) ICD-10-CM: K75.81  ICD-9-CM: 571.8  11/29/2014        Diabetes mellitus ([facility] [address] ICD-10-CM: E11.9  ICD-9-CM: 250.00  11/5/2013        Gout ICD-10-CM: M10.9  ICD-9-CM: 274.9  11/5/2013        GERD (gastroesophageal reflux disease) ICD-10-CM: K21.9  ICD-9-CM: 530.81  11/5/2013        Hypothyroidism ICD-10-CM: E03.9  ICD-9-CM: 244.9  11/5/2013        Hypertension ICD-10-CM: I10  ICD-9-CM: 401.9  11/5/2013        S/P cholecystectomy ICD-10-CM: Z90.49  ICD-9-CM: V45.79  11/5/2013        S/P EULALIA (total abdominal hysterectomy) ICD-10-CM: Z90.710  ICD-9-CM: V88.01  11/5/2013           Josh Mukherjee was seen today at the [facility] for monitoring of MÉNDEZ/cirrhosis.   The active problem list, all pertinent past medical history, medications, liver histology, endoscopic studies, radiologic findings and laboratory findings related to the liver disorder were reviewed with the patient. Serologic evaluation was positive for JAEL and ASMA. A liver biopsy performed in 2005 suggested MÉNDEZ with fibrosis. She had been on prednisone for macrobid induced pulmonary fibrosis. While on prednisone the liver enzymes normalized. When off prednisone the liver enzymes increased. She had a very high titer positive JAEL and ASMA. The possibility she had AIH and not MÉNDEZ was considered. Repeat liver biopsy in 8/2014 demonstrates MÉNDEZ with bridging fibrosis. Ultrasound imaging of the liver was performed in 04/2019. This demonstrates a 1.6 X 1.2 cm subcapsular mass in the right hepatic lobe. This was better characterized with dynamic MRI in 04/2019 and is classified as a LI-RADS 3 lesion. MRI was repeated in 07/2020, 01/2021, 07/2021 and 007/2022. Now LI-RADS 2. The patient feels well and voiced no complaints today. The patient has no complaints which can be attributed to liver disease. The patient completes all daily activities without any functional limitations. The patient has not experienced fatigue, fevers, chills, shortness of breath, chest pain, pain in the right side over the liver, diffuse abdominal pain, nausea, vomiting, constipation, diarrhrea, dry eyes, dry mouth, arthralgias, myalgias, yellowing of the eyes or skin, itching, dark urine, problems concentrating, swelling of the abdomen, swelling of the lower extremities, hematemesis, or hematochezia. Since the last office appointment the patient:  Had no changes in the liver disease. Has lost 16 pounds since 07/2021. Had iron infusion X 2 in 06/2022. ASSESSMENT AND PLAN:  MÉNDEZ with bridging fibrosis/cirrhosis. Based on labs, the patient appears to have cirrhosis.   Will perform laboratory testing to monitor liver function and degree of liver injury. This will include hepatic panel, a CBC w/ diff, a BMP, a PT/INR, and an AFP-L3%. We have been following a LI-RADS 3 mass in the right lobe with MRI every 6 months for 2 years. No change in mass. The mass is benign by definition. Will switched imaging back to ultrasound every 6 months for [facility]. surveillance. Repeat ultrasound was ordered today to be performed in 01/2023. The patient was counseled regarding diet and exercise to achieve weight loss. The best diet for patients with fatty liver is one very low in carbohydrates and enriched with protein such as an Ilean Barrs program.      The patient was directed to continue all current medications at the current dosages. There are no contraindications for the patient to take any medications that are necessary for treatment of other medical issues including medications for diabetes mellitus and hypercholesterolemia. The patient was counseled regarding alcohol consumption and that this could contribute to fatty liver disease. Vaccination for viral hepatitis B is recommended since the patient has no serologic evidence of previous exposure or vaccination with immunity. The need for vaccination against viral hepatitis A will be assessed with serologic and instituted as appropriate. All of the above issues were discussed with the patient. All questions were answered. The patient expressed a clear understanding of the above. Allergies   Allergen Reactions    Ciprofloxacin Itching     \"scalp burns and body itches\"    Macrobid [Nitrofurantoin Monohyd/M-Cryst] Other (comments)     Scarred lungs    Serotonin 5ht-3 Antagonists Other (comments)     fever    Sulfa (Sulfonamide Antibiotics) Hives    Tetanus Immune Globulin Swelling     Current Outpatient Medications   Medication Sig    montelukast (SINGULAIR) 10 mg tablet Take 10 mg by mouth daily.     vit B complex no.12/niacin,B3, (VITAMIN B COMPLEX NO. 12-NIACIN PO) Take 1,000 mg by mouth. glucose blood VI test strips (PRECISION XTRA TEST) strip Use to check bs 5x daily    GIGI PEN NEEDLE 32 gauge x 5/32\" ndle     metoprolol succinate (TOPROL-XL) 100 mg tablet Take  by mouth daily. atorvastatin (LIPITOR) 10 mg tablet Take  by mouth daily. insulin lispro (HUMALOG) 100 unit/mL kwikpen by SubCUTAneous route Before breakfast, lunch, dinner and at bedtime. Sliding scale   Indications: TYPE 2 DIABETES MELLITUS    nitroglycerin (NITROSTAT) 0.4 mg SL tablet 0.4 mg by SubLINGual route every five (5) minutes as needed for Chest Pain. allopurinol (ZYLOPRIM) 300 mg tablet Take  by mouth daily. Indications: GOUT    levothyroxine (SYNTHROID) 125 mcg tablet Take 125 mcg by mouth Daily (before breakfast). 125mcg Tuesday, W ednesday, Friday,Saturday, Sunday  62. 5mcg Monday, Thursday  Indications: HYPOTHYROIDISM    insulin detemir (LEVEMIR) 100 unit/mL (3 mL) pen 50 Units by SubCUTAneous route two (2) times a day. 20 units in the morning and 40 units at night. Indications: type 2 diabetes mellitus    omeprazole (PRILOSEC) 20 mg capsule Take 20 mg by mouth daily. Indications: GASTROESOPHAGEAL REFLUX     No current facility-administered medications for this visit. REVIEW OF SYSTEMS:  Constitution systems: Negative for fever, chills, weight gain, weight loss. Eyes: Negative for diplopia, visual changes, eye pain. ENT: Negative for sore throat, painful swallowing. Respiratory: Negative for cough, hemoptysis, dyspnea. Cardiology: Negative for chest pain, palpitations. GI:  Negative for constipation or diarrhea. : Negative for urinary frequency, dysuria and hematuria. Skin: Negative for rash. Hematology: Negative for easy bruising, bleeding from gums or nose. Musculo-skelatal: Negative for back pain, muscle pain, weakness. Neurologic: Negative for headaches, dizziness, vertigo, memory problems. Psychology: Negative for anxiety, depression. FAMILY/SOCIAL HISTORY:  The patient is . The patient has 3 children, and 10 grandchildren. The patient has never used tobacco products. The patient consumes 5 alcoholic beverages per week. 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 PHYSICAL EXAM:  General: No acute distress. Eyes: Sclera anicteric. ENT: No oral lesions. Thyroid normal.  Nodes: No adenopathy. Skin: No spider angiomata. No jaundice. No palmar erythema. Respiratory: Lungs clear to auscultation. Cardiovascular: Regular heart rate. No murmurs. No JVD. Abdomen: Soft non-tender. Liver size normal to percussion/palpation. Spleen not palpable. No obvious ascites. Extremities: No edema. No muscle wasting. No gross arthritic changes. Neurologic: Alert and oriented. Cranial nerves grossly intact. No asterixis.     LABORATORY STUDIES:  [facility] [address] Units 7/29/2021   WBC 3.4 - 10.8 x10E3/uL 4.0   ANC 1.4 - 7.0 x10E3/uL 2.7   HGB 11.1 - 15.9 g/dL 12.6    - 450 x10E3/uL 74 (LL)   INR 0.9 - 1.2 1.1   AST 0 - 40 IU/L 34   ALT 0 - 32 IU/L 22   Alk Phos 48 - 121 IU/L 131 (H)   Bili, Total 0.0 - 1.2 mg/dL 0.6   Bili, Direct 0.00 - 0.40 mg/dL 0.20   Albumin 3.7 - 4.7 g/dL 3.5 (L)   BUN 8 - 27 mg/dL 11   Creat 0.57 - 1.00 mg/dL 0.63   Creat (iSTAT) 0.6 - 1.3 MG/DL    Na 134 - 144 mmol/L 142   K 3.5 - 5.2 mmol/L 3.9   Cl 96 - 106 mmol/L 106   CO2 20 - 29 mmol/L 25   Glucose 65 - 99 mg/dL 134 (H)     Cancer Screening Latest Ref Rng & Units 7/29/2021 1/28/2021 7/29/2020   AFP, Serum 0.0 - 8.0 ng/mL 1.2 2.1 1.7   AFP-L3% 0.0 - 9.9 % Comment Comment Comment     SEROLOGIES:  Serologies Latest Ref Rng 11/5/2013   Hep B Surface Ag Negative Negative   Hep B Core Ab, Total Negative Negative   Ferritin 15 - 150 ng/mL 213 (H)   Iron % Saturation 15 - 55 % 14 (L)   JAEL, IFA  See patterns   JAEL Pattern  1:1280 (H)   ASMCA 0 - 19 Units 35 (H)   Alpha-1 antitrypsin level 90 - 200 mg/dL 176     11/2013. Anti-HBsurface negative, anti-HCV negative. LIVER HISTOLOGY:  2005. MÉNDEZ with fibrosis      8/2014. Slides reviewed by MLS. MÉNDEZ with bridging fibrosis. 06/2017. TRANSIENT HEPATIC ELASTOGRAPHY:   E Range: 7.3-11.5 kPa  E Mean: 8.9 kPa  E Median: 8.7 kPa  E Std: 1.5 kPa    07/2018. TRANSIENT HEPATIC ELASTOGRAPHY:   E Range: 4.91-11.28 kPa  E Mean: 7.38 kPa  E Median: 7.17 kPa  E Std: 1.8 kPa    ENDOSCOPIC PROCEDURES:  02/2021. EGD by Dr. Richard Aguirre. Report not available. RADIOLOGY:  11/2013. Ultrasound of liver. Echogenic consistent with chronic liver disease. No liver mass lesions. No dilated bile ducts. No ascites. 10/2015: Ultrasound of liver. Echogenic consistent with chronic liver disease. No liver mass lesions. No dilated bile ducts. No ascites. 6/2016: Ultrasound of liver. Echogenic consistent with chronic liver disease. No liver mass lesions. No dilated bile ducts. No ascites. 06/2017. Ultrasound of the liver. Evidence of chronic hepatocellular disease without suspicious mass   07/2018. Ultrasound of the liver. Heterogeneous and slightly coarse in echogenicity. There is incidental small anechoic 1.7 cm cyst in the left hepatic lobe. No evidence of solid mass. 04/2019. Ultrasound of the liver. Persistent sonographic findings of fibrosis from chronic hepatocellular changes and/or steatosis. Interval appearing anterior subcapsular right lobe of liver circumscribed indeterminate 1.8 cm hypoechoic lesion. 04/2019. Dynamic MRI of the liver. Hepatic steatosis with possible cirrhosis. Inferior right hepatic lobe nodule identified possibly corresponding with the hypoechoic nodule in the ultrasound. This is an LR-3 lesion. No arterial enhancing nodules or masses identified. 07/2019. Dynamic MRI of the abdomen.   Small area of progressive nodular enhancement in the inferior right hepatic lobe, which does not enhance in the arterial phase, but demonstrates enhancement in the portal venous phase without washout, is unchanged in appearance since 4/12/2019. There is persistent nodular enhancement on the equilibrium phase imaging. No washout. LR-3.  01/2020. Dynamic MRI of the abdomen. Hepatic steatosis with possible cirrhosis. Inferior right hepatic lobe nodule which demonstrates portal venous enhancement which persists is unchanged in size or appearance since prior MRI from 04/12/2019. LR-3. -No new developing hepatic finding.  07/2020. Dynamic MRI of the abdomen. Small lesion in the inferior right hepatic lobe is similar in size without highly suspicious features, LR-3.  01/2021. Dynamic MRI of the abdomen. Stable MR liver since prior exam 7-2020. Subcentimeter observation, segment VII subcapsular inferior right lobe liver, LI-RADS-3.  07/2021. Dynamic MRI of the abdomen. Small lesion in the inferior right hepatic lobe described previously is increasingly inconspicuous. LR-2. No new worrisome liver lesions. Liver mildly atrophied and lobulated consistent with cirrhosis. 01/2022. Ultrasound of the liver. Cirrhosis. Right hepatic lobe 2.0 x 1.4 cm hypoechoic lesion. 07/2022. Ultrasound of the liver. Coarsened internal echotexture with nodular hepatic contours. In the  right hepatic lobe, there is a 2.2 cm hypoechoic.  07/2022. Dynamic MRI of the liver. Cirrhosis with sequela of portal hypertension including splenomegaly,varices and ascites. Subtle inferior right liver lesion described previously is increasingly inconspicuous. LR-2. No new suspicious liver lesions. Nonocclusive portal vein/SMV thrombus has decreased since 7/21/2021. OTHER TESTING:  Not available or performed    FOLLOW-UP:  All of the issues listed above in the Assessment and Plan were discussed with the patient. All questions were answered. The patient expressed a clear understanding of the above.     Follow up in 6 months for continued monitoring.       Leigha Soliz, FNP-C  [facility] [place] [facility]  [address], [address] [facility], [address] [place] [address]   [phone]

## 2023-01-30 ENCOUNTER — HOSPITAL ENCOUNTER (OUTPATIENT)
Dept: ULTRASOUND IMAGING | Age: 83
Discharge: HOME | End: 2023-01-30
Payer: MEDICARE

## 2023-01-30 DIAGNOSIS — K74.60 CIRRHOSIS OF LIVER WITHOUT ASCITES, UNSPECIFIED HEPATIC CIRRHOSIS TYPE (HCC): ICD-10-CM

## 2023-01-30 DIAGNOSIS — K74.60 CIRRHOSIS OF LIVER (HCC): ICD-10-CM

## 2023-01-30 PROCEDURE — 76705 ECHO EXAM OF ABDOMEN: CPT

## 2023-01-30 PROCEDURE — 93976 VASCULAR STUDY: CPT

## 2023-04-20 ENCOUNTER — OFFICE VISIT (OUTPATIENT)
Age: 83
End: 2023-04-20
Payer: MEDICARE

## 2023-04-20 ENCOUNTER — HOSPITAL ENCOUNTER (OUTPATIENT)
Facility: HOSPITAL | Age: 83
Setting detail: SPECIMEN
Discharge: HOME | End: 2023-04-20
Payer: MEDICARE

## 2023-04-20 VITALS
DIASTOLIC BLOOD PRESSURE: 52 MMHG | WEIGHT: 156.6 LBS | RESPIRATION RATE: 18 BRPM | HEART RATE: 68 BPM | OXYGEN SATURATION: 100 % | TEMPERATURE: 98.3 F | SYSTOLIC BLOOD PRESSURE: 132 MMHG | BODY MASS INDEX: 27.75 KG/M2 | HEIGHT: 63 IN

## 2023-04-20 DIAGNOSIS — K74.60 CIRRHOSIS OF LIVER WITHOUT ASCITES, UNSPECIFIED HEPATIC CIRRHOSIS TYPE (HCC): ICD-10-CM

## 2023-04-20 DIAGNOSIS — K74.60 CIRRHOSIS OF LIVER WITHOUT ASCITES, UNSPECIFIED HEPATIC CIRRHOSIS TYPE (HCC): Primary | ICD-10-CM

## 2023-04-20 LAB
ALBUMIN SERPL-MCNC: 2.8 G/DL (ref 3.4–5)
ALBUMIN/GLOB SERPL: 0.6 (ref 0.8–1.7)
ALP SERPL-CCNC: 109 U/L (ref 45–117)
ALT SERPL-CCNC: 23 U/L (ref 13–56)
ANION GAP SERPL CALC-SCNC: 2 MMOL/L (ref 3–18)
AST SERPL-CCNC: 34 U/L (ref 10–38)
BASOPHILS # BLD: 0 K/UL (ref 0–0.1)
BASOPHILS NFR BLD: 1 % (ref 0–2)
BILIRUB DIRECT SERPL-MCNC: 0.3 MG/DL (ref 0–0.2)
BILIRUB SERPL-MCNC: 0.9 MG/DL (ref 0.2–1)
BUN SERPL-MCNC: 13 MG/DL (ref 7–18)
BUN/CREAT SERPL: 18 (ref 12–20)
CALCIUM SERPL-MCNC: 8.7 MG/DL (ref 8.5–10.1)
CHLORIDE SERPL-SCNC: 105 MMOL/L (ref 100–111)
CO2 SERPL-SCNC: 31 MMOL/L (ref 21–32)
CREAT SERPL-MCNC: 0.73 MG/DL (ref 0.6–1.3)
DIFFERENTIAL METHOD BLD: ABNORMAL
EOSINOPHIL # BLD: 0.1 K/UL (ref 0–0.4)
EOSINOPHIL NFR BLD: 3 % (ref 0–5)
ERYTHROCYTE [DISTWIDTH] IN BLOOD BY AUTOMATED COUNT: 16 % (ref 11.6–14.5)
GLOBULIN SER CALC-MCNC: 4.5 G/DL (ref 2–4)
GLUCOSE SERPL-MCNC: 114 MG/DL (ref 74–99)
HCT VFR BLD AUTO: 36.7 % (ref 35–45)
HGB BLD-MCNC: 11.7 G/DL (ref 12–16)
IMM GRANULOCYTES # BLD AUTO: 0 K/UL (ref 0–0.04)
IMM GRANULOCYTES NFR BLD AUTO: 0 % (ref 0–0.5)
INR PPP: 1.3 (ref 0.8–1.2)
LYMPHOCYTES # BLD: 0.6 K/UL (ref 0.9–3.6)
LYMPHOCYTES NFR BLD: 20 % (ref 21–52)
MCH RBC QN AUTO: 30.5 PG (ref 24–34)
MCHC RBC AUTO-ENTMCNC: 31.9 G/DL (ref 31–37)
MCV RBC AUTO: 95.8 FL (ref 78–100)
MONOCYTES # BLD: 0.4 K/UL (ref 0.05–1.2)
MONOCYTES NFR BLD: 12 % (ref 3–10)
NEUTS SEG # BLD: 1.8 K/UL (ref 1.8–8)
NEUTS SEG NFR BLD: 63 % (ref 40–73)
NRBC # BLD: 0 K/UL (ref 0–0.01)
NRBC BLD-RTO: 0 PER 100 WBC
PLATELET # BLD AUTO: 64 K/UL (ref 135–420)
PMV BLD AUTO: 12.2 FL (ref 9.2–11.8)
POTASSIUM SERPL-SCNC: 4.1 MMOL/L (ref 3.5–5.5)
PROT SERPL-MCNC: 7.3 G/DL (ref 6.4–8.2)
PROTHROMBIN TIME: 16.2 SEC (ref 11.5–15.2)
RBC # BLD AUTO: 3.83 M/UL (ref 4.2–5.3)
SODIUM SERPL-SCNC: 138 MMOL/L (ref 136–145)
WBC # BLD AUTO: 2.9 K/UL (ref 4.6–13.2)

## 2023-04-20 PROCEDURE — 3075F SYST BP GE 130 - 139MM HG: CPT | Performed by: NURSE PRACTITIONER

## 2023-04-20 PROCEDURE — 3078F DIAST BP <80 MM HG: CPT | Performed by: NURSE PRACTITIONER

## 2023-04-20 PROCEDURE — 85610 PROTHROMBIN TIME: CPT

## 2023-04-20 PROCEDURE — 82107 ALPHA-FETOPROTEIN L3: CPT

## 2023-04-20 PROCEDURE — 1123F ACP DISCUSS/DSCN MKR DOCD: CPT | Performed by: NURSE PRACTITIONER

## 2023-04-20 PROCEDURE — 1090F PRES/ABSN URINE INCON ASSESS: CPT | Performed by: NURSE PRACTITIONER

## 2023-04-20 PROCEDURE — 80048 BASIC METABOLIC PNL TOTAL CA: CPT

## 2023-04-20 PROCEDURE — 4004F PT TOBACCO SCREEN RCVD TLK: CPT | Performed by: NURSE PRACTITIONER

## 2023-04-20 PROCEDURE — 99214 OFFICE O/P EST MOD 30 MIN: CPT | Performed by: NURSE PRACTITIONER

## 2023-04-20 PROCEDURE — 85025 COMPLETE CBC W/AUTO DIFF WBC: CPT

## 2023-04-20 PROCEDURE — G8400 PT W/DXA NO RESULTS DOC: HCPCS | Performed by: NURSE PRACTITIONER

## 2023-04-20 PROCEDURE — G8417 CALC BMI ABV UP PARAM F/U: HCPCS | Performed by: NURSE PRACTITIONER

## 2023-04-20 PROCEDURE — G8427 DOCREV CUR MEDS BY ELIG CLIN: HCPCS | Performed by: NURSE PRACTITIONER

## 2023-04-20 PROCEDURE — 80076 HEPATIC FUNCTION PANEL: CPT

## 2023-04-20 PROCEDURE — 36415 COLL VENOUS BLD VENIPUNCTURE: CPT

## 2023-04-20 NOTE — PROGRESS NOTES
diarrhea. : Negative for urinary frequency, dysuria and hematuria. Skin: Negative for rash. Hematology: Negative for easy bruising, bleeding from gums or nose. Musculo-skelatal: Negative for back pain, muscle pain, weakness. Neurologic: Negative for headaches, dizziness, vertigo, memory problems. Psychology: Negative for anxiety, depression. FAMILY/SOCIAL HISTORY:  The patient is . The patient has 3 children, and 10 grandchildren. The patient has never used tobacco products. The patient consumes 5 alcoholic beverages per week. 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 PHYSICAL EXAM:  General: No acute distress. Eyes: Sclera anicteric. ENT: No oral lesions. Thyroid normal.  Nodes: No adenopathy. Skin: No spider angiomata. No jaundice. No palmar erythema. Respiratory: Lungs clear to auscultation. Cardiovascular: Regular heart rate. No murmurs. No JVD. Abdomen: Soft non-tender. Liver size normal to percussion/palpation. Spleen not palpable. No obvious ascites. Extremities: No edema. No muscle wasting. No gross arthritic changes. Neurologic: Alert and oriented. Cranial nerves grossly intact. No asterixis. LABORATORY STUDIES:  From 03/2023:  AST/ ALT/ ALP/ T. BILI/ ALB: 28/ 19/ 104/ 1.3/ 3.0  BUN/ CRT/ PLT: 12/ 0.59/ 70,000    [facility] [place] of [address] [place] [address] Ref Rng & Units 7/29/2021   WBC 3.4 - 10.8 x10E3/uL 4.0   ANC 1.4 - 7.0 x10E3/uL 2.7   HGB 11.1 - 15.9 g/dL 12.6    - 450 x10E3/uL 74 (LL)   INR 0.9 - 1.2 1.1   AST 0 - 40 IU/L 34   ALT 0 - 32 IU/L 22   Alk Phos 48 - 121 IU/L 131 (H)   Bili, Total 0.0 - 1.2 mg/dL 0.6   Bili, Direct 0.00 - 0.40 mg/dL 0.20   Albumin 3.7 - 4.7 g/dL 3.5 (L)   BUN 8 - 27 mg/dL 11   Creat 0.57 - 1.00 mg/dL 0.63   Creat (iSTAT) 0.6 - 1.3 MG/DL    Na 134 - 144 mmol/L 142   K 3.5 - 5.2 mmol/L 3.9   Cl 96 - 106 mmol/L 106   CO2 20 - 29 mmol/L 25   Glucose 65 -

## 2023-04-21 LAB
AFP L3 MFR SERPL: NORMAL % (ref 0–9.9)
AFP SERPL-MCNC: 1.6 NG/ML (ref 0–9.2)

## 2023-04-24 RX ORDER — SPIRONOLACTONE 50 MG/1
50 TABLET, FILM COATED ORAL DAILY
Qty: 90 TABLET | Refills: 3 | Status: SHIPPED | OUTPATIENT
Start: 2023-04-24

## 2023-04-24 RX ORDER — FUROSEMIDE 20 MG/1
20 TABLET ORAL DAILY
Qty: 90 TABLET | Refills: 3 | Status: SHIPPED | OUTPATIENT
Start: 2023-04-24

## 2023-10-24 ENCOUNTER — OFFICE VISIT (OUTPATIENT)
Age: 83
End: 2023-10-24
Payer: MEDICARE

## 2023-10-24 ENCOUNTER — HOSPITAL ENCOUNTER (OUTPATIENT)
Facility: HOSPITAL | Age: 83
Setting detail: SPECIMEN
Discharge: HOME | End: 2023-10-27
Payer: MEDICARE

## 2023-10-24 VITALS
HEART RATE: 58 BPM | SYSTOLIC BLOOD PRESSURE: 124 MMHG | TEMPERATURE: 98.1 F | WEIGHT: 153 LBS | HEIGHT: 63 IN | BODY MASS INDEX: 27.11 KG/M2 | OXYGEN SATURATION: 97 % | DIASTOLIC BLOOD PRESSURE: 57 MMHG

## 2023-10-24 DIAGNOSIS — K74.60 CIRRHOSIS OF LIVER WITHOUT ASCITES, UNSPECIFIED HEPATIC CIRRHOSIS TYPE (HCC): ICD-10-CM

## 2023-10-24 DIAGNOSIS — K74.60 CIRRHOSIS OF LIVER WITHOUT ASCITES, UNSPECIFIED HEPATIC CIRRHOSIS TYPE (HCC): Primary | ICD-10-CM

## 2023-10-24 LAB
ALBUMIN SERPL-MCNC: 2.9 G/DL (ref 3.4–5)
ALBUMIN/GLOB SERPL: 0.7 (ref 0.8–1.7)
ALP SERPL-CCNC: 91 U/L (ref 45–117)
ALT SERPL-CCNC: 22 U/L (ref 13–56)
ANION GAP SERPL CALC-SCNC: 7 MMOL/L (ref 3–18)
AST SERPL-CCNC: 25 U/L (ref 10–38)
BASOPHILS # BLD: 0 K/UL (ref 0–0.1)
BASOPHILS NFR BLD: 1 % (ref 0–2)
BILIRUB DIRECT SERPL-MCNC: 0.5 MG/DL (ref 0–0.2)
BILIRUB SERPL-MCNC: 1.3 MG/DL (ref 0.2–1)
BUN SERPL-MCNC: 17 MG/DL (ref 7–18)
BUN/CREAT SERPL: 23 (ref 12–20)
CALCIUM SERPL-MCNC: 9.2 MG/DL (ref 8.5–10.1)
CHLORIDE SERPL-SCNC: 104 MMOL/L (ref 100–111)
CO2 SERPL-SCNC: 31 MMOL/L (ref 21–32)
CREAT SERPL-MCNC: 0.74 MG/DL (ref 0.6–1.3)
DIFFERENTIAL METHOD BLD: ABNORMAL
EOSINOPHIL # BLD: 0.1 K/UL (ref 0–0.4)
EOSINOPHIL NFR BLD: 4 % (ref 0–5)
ERYTHROCYTE [DISTWIDTH] IN BLOOD BY AUTOMATED COUNT: 15.6 % (ref 11.6–14.5)
FERRITIN SERPL-MCNC: 29 NG/ML (ref 8–388)
GLOBULIN SER CALC-MCNC: 4.3 G/DL (ref 2–4)
GLUCOSE SERPL-MCNC: 101 MG/DL (ref 74–99)
HCT VFR BLD AUTO: 35.9 % (ref 35–45)
HGB BLD-MCNC: 11.3 G/DL (ref 12–16)
IMM GRANULOCYTES # BLD AUTO: 0 K/UL (ref 0–0.04)
IMM GRANULOCYTES NFR BLD AUTO: 0 % (ref 0–0.5)
INR PPP: 1.2 (ref 0.9–1.1)
IRON SATN MFR SERPL: 25 % (ref 20–50)
IRON SERPL-MCNC: 84 UG/DL (ref 50–175)
LYMPHOCYTES # BLD: 0.7 K/UL (ref 0.9–3.6)
LYMPHOCYTES NFR BLD: 21 % (ref 21–52)
MCH RBC QN AUTO: 31 PG (ref 24–34)
MCHC RBC AUTO-ENTMCNC: 31.5 G/DL (ref 31–37)
MCV RBC AUTO: 98.6 FL (ref 78–100)
MONOCYTES # BLD: 0.3 K/UL (ref 0.05–1.2)
MONOCYTES NFR BLD: 10 % (ref 3–10)
NEUTS SEG # BLD: 2.1 K/UL (ref 1.8–8)
NEUTS SEG NFR BLD: 64 % (ref 40–73)
NRBC # BLD: 0 K/UL (ref 0–0.01)
NRBC BLD-RTO: 0 PER 100 WBC
PLATELET # BLD AUTO: 78 K/UL (ref 135–420)
PMV BLD AUTO: 12 FL (ref 9.2–11.8)
POTASSIUM SERPL-SCNC: 3.6 MMOL/L (ref 3.5–5.5)
PROT SERPL-MCNC: 7.2 G/DL (ref 6.4–8.2)
PROTHROMBIN TIME: 15.2 SEC (ref 11.9–14.7)
RBC # BLD AUTO: 3.64 M/UL (ref 4.2–5.3)
SODIUM SERPL-SCNC: 142 MMOL/L (ref 136–145)
TIBC SERPL-MCNC: 334 UG/DL (ref 250–450)
WBC # BLD AUTO: 3.2 K/UL (ref 4.6–13.2)

## 2023-10-24 PROCEDURE — G8417 CALC BMI ABV UP PARAM F/U: HCPCS | Performed by: NURSE PRACTITIONER

## 2023-10-24 PROCEDURE — 83540 ASSAY OF IRON: CPT

## 2023-10-24 PROCEDURE — 83550 IRON BINDING TEST: CPT

## 2023-10-24 PROCEDURE — 4004F PT TOBACCO SCREEN RCVD TLK: CPT | Performed by: NURSE PRACTITIONER

## 2023-10-24 PROCEDURE — 82728 ASSAY OF FERRITIN: CPT

## 2023-10-24 PROCEDURE — 3078F DIAST BP <80 MM HG: CPT | Performed by: NURSE PRACTITIONER

## 2023-10-24 PROCEDURE — 1123F ACP DISCUSS/DSCN MKR DOCD: CPT | Performed by: NURSE PRACTITIONER

## 2023-10-24 PROCEDURE — G8427 DOCREV CUR MEDS BY ELIG CLIN: HCPCS | Performed by: NURSE PRACTITIONER

## 2023-10-24 PROCEDURE — 3074F SYST BP LT 130 MM HG: CPT | Performed by: NURSE PRACTITIONER

## 2023-10-24 PROCEDURE — G8400 PT W/DXA NO RESULTS DOC: HCPCS | Performed by: NURSE PRACTITIONER

## 2023-10-24 PROCEDURE — 85610 PROTHROMBIN TIME: CPT

## 2023-10-24 PROCEDURE — 99214 OFFICE O/P EST MOD 30 MIN: CPT | Performed by: NURSE PRACTITIONER

## 2023-10-24 PROCEDURE — G8484 FLU IMMUNIZE NO ADMIN: HCPCS | Performed by: NURSE PRACTITIONER

## 2023-10-24 PROCEDURE — 80076 HEPATIC FUNCTION PANEL: CPT

## 2023-10-24 PROCEDURE — 1090F PRES/ABSN URINE INCON ASSESS: CPT | Performed by: NURSE PRACTITIONER

## 2023-10-24 PROCEDURE — 80048 BASIC METABOLIC PNL TOTAL CA: CPT

## 2023-10-24 PROCEDURE — 91200 LIVER ELASTOGRAPHY: CPT | Performed by: NURSE PRACTITIONER

## 2023-10-24 PROCEDURE — 85025 COMPLETE CBC W/AUTO DIFF WBC: CPT

## 2023-10-24 PROCEDURE — 36415 COLL VENOUS BLD VENIPUNCTURE: CPT

## 2023-10-24 PROCEDURE — 82107 ALPHA-FETOPROTEIN L3: CPT

## 2023-10-24 NOTE — PROGRESS NOTES
inconspicuous. LR-2. No new suspicious liver lesions. Nonocclusive portal vein/SMV thrombus has decreased since 7/21/2021.  01/2023. Ultrasound of the liver. Heterogeneous hepatic parenchymal echotexture with a nodular contour compatible with cirrhosis. No focal liver masses seen. OTHER TESTING:  Not available or performed    FOLLOW-UP:  All of the issues listed above in the Assessment and Plan were discussed with the patient. All questions were answered. The patient expressed a clear understanding of the above. Follow up in 6 months for continued monitoring.       Monalisa Koyanagi, DYLON-AJITH  [facility] [place] [facility]  [address], [address] [place], [address]   [phone]

## 2023-10-25 RX ORDER — FERROUS SULFATE TAB EC 324 MG (65 MG FE EQUIVALENT) 324 (65 FE) MG
324 TABLET DELAYED RESPONSE ORAL
Qty: 90 TABLET | Refills: 1 | Status: SHIPPED | OUTPATIENT
Start: 2023-10-25

## 2023-10-26 LAB
AFP L3 MFR SERPL: NORMAL % (ref 0–9.9)
AFP SERPL-MCNC: 1.3 NG/ML (ref 0–8.7)

## 2024-04-03 RX ORDER — FUROSEMIDE 20 MG/1
20 TABLET ORAL DAILY
Qty: 90 TABLET | Refills: 3 | Status: SHIPPED | OUTPATIENT
Start: 2024-04-03

## 2024-04-03 RX ORDER — SPIRONOLACTONE 50 MG/1
50 TABLET, FILM COATED ORAL DAILY
Qty: 90 TABLET | Refills: 3 | Status: SHIPPED | OUTPATIENT
Start: 2024-04-03

## 2024-04-15 ENCOUNTER — HOSPITAL ENCOUNTER (OUTPATIENT)
Facility: HOSPITAL | Age: 84
Discharge: HOME | End: 2024-04-18
Payer: MEDICARE

## 2024-04-15 DIAGNOSIS — K74.60 CIRRHOSIS OF LIVER WITHOUT ASCITES, UNSPECIFIED HEPATIC CIRRHOSIS TYPE (HCC): ICD-10-CM

## 2024-04-15 PROCEDURE — 76705 ECHO EXAM OF ABDOMEN: CPT

## 2024-04-17 DIAGNOSIS — K74.60 CIRRHOSIS OF LIVER WITHOUT ASCITES, UNSPECIFIED HEPATIC CIRRHOSIS TYPE (HCC): Primary | ICD-10-CM

## 2024-04-17 RX ORDER — SPIRONOLACTONE 50 MG/1
100 TABLET, FILM COATED ORAL DAILY
Qty: 90 TABLET | Refills: 3 | Status: SHIPPED | OUTPATIENT
Start: 2024-04-17

## 2024-04-17 RX ORDER — FUROSEMIDE 40 MG/1
40 TABLET ORAL DAILY
Qty: 90 TABLET | Refills: 3 | Status: SHIPPED | OUTPATIENT
Start: 2024-04-17

## 2024-04-17 NOTE — PROGRESS NOTES
Updated on current imaging.  Increased diuretics to step one.  Ultrasound guided paracentesis ordered.  Follow up as scheduled.

## 2024-04-23 ENCOUNTER — HOSPITAL ENCOUNTER (OUTPATIENT)
Facility: HOSPITAL | Age: 84
Discharge: HOME | End: 2024-04-23
Attending: INTERNAL MEDICINE | Admitting: INTERNAL MEDICINE
Payer: MEDICARE

## 2024-04-23 ENCOUNTER — OFFICE VISIT (OUTPATIENT)
Age: 84
End: 2024-04-23
Payer: MEDICARE

## 2024-04-23 VITALS
HEART RATE: 64 BPM | TEMPERATURE: 97.1 F | WEIGHT: 133 LBS | HEIGHT: 63 IN | OXYGEN SATURATION: 96 % | DIASTOLIC BLOOD PRESSURE: 56 MMHG | BODY MASS INDEX: 23.57 KG/M2 | RESPIRATION RATE: 20 BRPM | SYSTOLIC BLOOD PRESSURE: 108 MMHG

## 2024-04-23 VITALS
TEMPERATURE: 97.7 F | BODY MASS INDEX: 24.8 KG/M2 | RESPIRATION RATE: 20 BRPM | HEART RATE: 62 BPM | SYSTOLIC BLOOD PRESSURE: 135 MMHG | DIASTOLIC BLOOD PRESSURE: 77 MMHG | WEIGHT: 140 LBS | HEIGHT: 63 IN | OXYGEN SATURATION: 98 %

## 2024-04-23 DIAGNOSIS — K74.60 CIRRHOSIS OF LIVER WITHOUT ASCITES, UNSPECIFIED HEPATIC CIRRHOSIS TYPE (HCC): ICD-10-CM

## 2024-04-23 DIAGNOSIS — K74.60 CIRRHOSIS OF LIVER WITHOUT ASCITES, UNSPECIFIED HEPATIC CIRRHOSIS TYPE (HCC): Primary | ICD-10-CM

## 2024-04-23 LAB
ALBUMIN SERPL-MCNC: 2.9 G/DL (ref 3.4–5)
ALBUMIN/GLOB SERPL: 0.6 (ref 0.8–1.7)
ALP SERPL-CCNC: 99 U/L (ref 45–117)
ALT SERPL-CCNC: 20 U/L (ref 13–56)
ANION GAP SERPL CALC-SCNC: 2 MMOL/L (ref 3–18)
APPEARANCE FLD: ABNORMAL
AST SERPL-CCNC: 28 U/L (ref 10–38)
BILIRUB DIRECT SERPL-MCNC: 0.4 MG/DL (ref 0–0.2)
BILIRUB SERPL-MCNC: 1.2 MG/DL (ref 0.2–1)
BUN SERPL-MCNC: 12 MG/DL (ref 7–18)
BUN/CREAT SERPL: 13 (ref 12–20)
CALCIUM SERPL-MCNC: 9 MG/DL (ref 8.5–10.1)
CHLORIDE SERPL-SCNC: 102 MMOL/L (ref 100–111)
CO2 SERPL-SCNC: 33 MMOL/L (ref 21–32)
COLOR FLD: YELLOW
CREAT SERPL-MCNC: 0.91 MG/DL (ref 0.6–1.3)
EOSINOPHIL NFR FLD MANUAL: 0 %
ERYTHROCYTE [DISTWIDTH] IN BLOOD BY AUTOMATED COUNT: 14.9 % (ref 11.6–14.5)
FERRITIN SERPL-MCNC: 33 NG/ML (ref 8–388)
GLOBULIN SER CALC-MCNC: 4.9 G/DL (ref 2–4)
GLUCOSE SERPL-MCNC: 197 MG/DL (ref 74–99)
HCT VFR BLD AUTO: 39.3 % (ref 35–45)
HGB BLD-MCNC: 13 G/DL (ref 12–16)
INR PPP: 1.3 (ref 0.9–1.1)
IRON SATN MFR SERPL: 53 % (ref 20–50)
IRON SERPL-MCNC: 165 UG/DL (ref 50–175)
LYMPHOCYTES NFR FLD: 47 %
MACROPHAGES NFR FLD: 50 %
MCH RBC QN AUTO: 31.5 PG (ref 24–34)
MCHC RBC AUTO-ENTMCNC: 33.1 G/DL (ref 31–37)
MCV RBC AUTO: 95.2 FL (ref 78–100)
MONOCYTES NFR FLD: 0 %
NEUTROPHILS NFR FLD: 3 %
NEUTS BAND # FLD: 0 %
NRBC # BLD: 0 K/UL (ref 0–0.01)
NRBC BLD-RTO: 0 PER 100 WBC
NUC CELL # FLD: 193 /CU MM
PLATELET # BLD AUTO: 85 K/UL (ref 135–420)
PMV BLD AUTO: 11.3 FL (ref 9.2–11.8)
POTASSIUM SERPL-SCNC: 3.9 MMOL/L (ref 3.5–5.5)
PROT SERPL-MCNC: 7.8 G/DL (ref 6.4–8.2)
PROTHROMBIN TIME: 16.6 SEC (ref 11.9–14.7)
RBC # BLD AUTO: 4.13 M/UL (ref 4.2–5.3)
RBC # FLD: <2000 /CU MM
SODIUM SERPL-SCNC: 137 MMOL/L (ref 136–145)
SPECIMEN SOURCE FLD: ABNORMAL
TIBC SERPL-MCNC: 314 UG/DL (ref 250–450)
WBC # BLD AUTO: 3.6 K/UL (ref 4.6–13.2)

## 2024-04-23 PROCEDURE — 89051 BODY FLUID CELL COUNT: CPT

## 2024-04-23 PROCEDURE — 6360000002 HC RX W HCPCS: Performed by: NURSE PRACTITIONER

## 2024-04-23 PROCEDURE — 1090F PRES/ABSN URINE INCON ASSESS: CPT | Performed by: NURSE PRACTITIONER

## 2024-04-23 PROCEDURE — G8427 DOCREV CUR MEDS BY ELIG CLIN: HCPCS | Performed by: NURSE PRACTITIONER

## 2024-04-23 PROCEDURE — 49083 ABD PARACENTESIS W/IMAGING: CPT

## 2024-04-23 PROCEDURE — 85610 PROTHROMBIN TIME: CPT

## 2024-04-23 PROCEDURE — 99214 OFFICE O/P EST MOD 30 MIN: CPT | Performed by: NURSE PRACTITIONER

## 2024-04-23 PROCEDURE — 3074F SYST BP LT 130 MM HG: CPT | Performed by: NURSE PRACTITIONER

## 2024-04-23 PROCEDURE — P9047 ALBUMIN (HUMAN), 25%, 50ML: HCPCS | Performed by: NURSE PRACTITIONER

## 2024-04-23 PROCEDURE — 1036F TOBACCO NON-USER: CPT | Performed by: NURSE PRACTITIONER

## 2024-04-23 PROCEDURE — 83540 ASSAY OF IRON: CPT

## 2024-04-23 PROCEDURE — 1123F ACP DISCUSS/DSCN MKR DOCD: CPT | Performed by: NURSE PRACTITIONER

## 2024-04-23 PROCEDURE — 83550 IRON BINDING TEST: CPT

## 2024-04-23 PROCEDURE — 80076 HEPATIC FUNCTION PANEL: CPT

## 2024-04-23 PROCEDURE — 85027 COMPLETE CBC AUTOMATED: CPT

## 2024-04-23 PROCEDURE — 82728 ASSAY OF FERRITIN: CPT

## 2024-04-23 PROCEDURE — 82107 ALPHA-FETOPROTEIN L3: CPT

## 2024-04-23 PROCEDURE — G8420 CALC BMI NORM PARAMETERS: HCPCS | Performed by: NURSE PRACTITIONER

## 2024-04-23 PROCEDURE — G8400 PT W/DXA NO RESULTS DOC: HCPCS | Performed by: NURSE PRACTITIONER

## 2024-04-23 PROCEDURE — 2500000003 HC RX 250 WO HCPCS: Performed by: INTERNAL MEDICINE

## 2024-04-23 PROCEDURE — 80048 BASIC METABOLIC PNL TOTAL CA: CPT

## 2024-04-23 PROCEDURE — 3078F DIAST BP <80 MM HG: CPT | Performed by: NURSE PRACTITIONER

## 2024-04-23 RX ORDER — LIDOCAINE HYDROCHLORIDE 10 MG/ML
10 INJECTION, SOLUTION EPIDURAL; INFILTRATION; INTRACAUDAL; PERINEURAL ONCE
Status: COMPLETED | OUTPATIENT
Start: 2024-04-23 | End: 2024-04-23

## 2024-04-23 RX ORDER — CHOLECALCIFEROL (VITAMIN D3) 1250 MCG
50000 CAPSULE ORAL DAILY
Status: ON HOLD | COMMUNITY
End: 2024-04-23

## 2024-04-23 RX ORDER — ALBUMIN (HUMAN) 12.5 G/50ML
25 SOLUTION INTRAVENOUS AS NEEDED
Status: DISCONTINUED | OUTPATIENT
Start: 2024-04-23 | End: 2024-04-23 | Stop reason: HOSPADM

## 2024-04-23 RX ORDER — ALBUMIN (HUMAN) 12.5 G/50ML
25 SOLUTION INTRAVENOUS ONCE
Status: COMPLETED | OUTPATIENT
Start: 2024-04-23 | End: 2024-04-23

## 2024-04-23 RX ADMIN — ALBUMIN (HUMAN) 25 G: 0.25 INJECTION, SOLUTION INTRAVENOUS at 10:04

## 2024-04-23 RX ADMIN — LIDOCAINE HYDROCHLORIDE ANHYDROUS 10 ML: 10 INJECTION, SOLUTION INFILTRATION at 12:10

## 2024-04-23 NOTE — DISCHARGE INSTRUCTIONS
Learning About Paracentesis  What is paracentesis?  Paracentesis (say \"bfyf-if-snq-ANNAMARIA-dinesh\") is a procedure that removes fluid from the belly. The buildup of fluid may be caused by infection, inflammation, an injury, or other problems.  Swelling from too much fluid may cause pain or trouble breathing. The doctor will remove the extra fluid with a needle attached to a tube.  Why is paracentesis done?  Paracentesis may be done to:  Find the cause of fluid buildup in the belly.  Diagnose an infection in the peritoneal fluid.  Check for certain types of cancer.  Remove a large amount of fluid that is causing pain or trouble breathing or that is affecting how the kidneys or the intestines (bowel) are working.  How is the procedure done?  You will empty your bladder before the procedure.  Your doctor or nurse will clean the area of your belly where the needle will go in. Then sterile towels will be put around the area.  You may get a shot of numbing medicine in the skin of your belly. Then your doctor will gently insert a needle where the fluid is. Your doctor may attach a tube (catheter) to the site to help collect the fluid.  After the fluid has drained, your doctor will take out the needle or catheter and put a bandage on the site.  How long does a paracentesis take?  The procedure may take from a few minutes to 30 minutes or more.  What happens after the test?  You can do your normal activities after the procedure, unless your doctor tells you not to.  After the procedure, you may have some clear fluid draining from the site, especially if a large amount of fluid was taken out. There will be less drainage in 1 to 2 days. Ask your doctor how much drainage to expect.  A small gauze pad and bandage may be needed. You may need to change the bandage.  If your doctor thinks that testing the fluid can help find out the cause of a problem, your doctor will send it to a lab.  If fluid builds up in your belly again, your

## 2024-04-23 NOTE — PROGRESS NOTES
Pt returned to care unit S/P Paracentesis. Pt awake and alert and tolerated procedure well. Band-aid to right abdomen dry and intact.     1230 Pt discharged home in the care of daughter in stable condition.

## 2024-04-23 NOTE — PROCEDURES
Ultrasound Guided Right Lower Quad Paracentesis was preformed.  2.6 liters was drained.  14ml sent to lab for testing.  Patient tolerated procedure with out pain.  Patient was handed off to Care Unit in stable condition.

## 2024-04-23 NOTE — PROGRESS NOTES
Macrobid [Nitrofurantoin Monohyd/M-Cryst] Other (comments)     Scarred lungs    Serotonin 5ht-3 Antagonists Other (comments)     fever    Sulfa (Sulfonamide Antibiotics) Hives    Tetanus Immune Globulin Swelling     Current Outpatient Medications   Medication Sig    montelukast (SINGULAIR) 10 mg tablet Take 10 mg by mouth daily.    vit B complex no.12/niacin,B3, (VITAMIN B COMPLEX NO.12-NIACIN PO) Take 1,000 mg by mouth.    glucose blood VI test strips (PRECISION XTRA TEST) strip Use to check bs 5x daily    ERMIAS PEN NEEDLE 32 gauge x 5/32\" ndle     metoprolol succinate (TOPROL-XL) 100 mg tablet Take  by mouth daily.    atorvastatin (LIPITOR) 10 mg tablet Take  by mouth daily.    insulin lispro (HUMALOG) 100 unit/mL kwikpen by SubCUTAneous route Before breakfast, lunch, dinner and at bedtime. Sliding scale   Indications: TYPE 2 DIABETES MELLITUS    nitroglycerin (NITROSTAT) 0.4 mg SL tablet 0.4 mg by SubLINGual route every five (5) minutes as needed for Chest Pain.    allopurinol (ZYLOPRIM) 300 mg tablet Take  by mouth daily. Indications: GOUT    levothyroxine (SYNTHROID) 125 mcg tablet Take 125 mcg by mouth Daily (before breakfast). 125mcg Tuesday, W ednesday, Friday,Saturday, Sunday  62.5mcg Monday, Thursday  Indications: HYPOTHYROIDISM    insulin detemir (LEVEMIR) 100 unit/mL (3 mL) pen 50 Units by SubCUTAneous route two (2) times a day. 20 units in the morning and 40 units at night.  Indications: type 2 diabetes mellitus    omeprazole (PRILOSEC) 20 mg capsule Take 20 mg by mouth daily. Indications: GASTROESOPHAGEAL REFLUX     No current facility-administered medications for this visit.       REVIEW OF SYSTEMS:  Constitution systems: Negative for fever, chills, weight gain, weight loss.   Eyes: Negative for diplopia, visual changes, eye pain.   ENT: Negative for sore throat, painful swallowing.   Respiratory: Negative for cough, hemoptysis, dyspnea.   Cardiology: Negative for chest pain, palpitations.  GI:

## 2024-04-25 LAB
AFP L3 MFR SERPL: NORMAL % (ref 0–9.9)
AFP SERPL-MCNC: 1.6 NG/ML (ref 0–8.7)

## 2024-04-26 NOTE — PERIOP NOTE
PAT Activity Status Questionnaire     Yes No Points for YES    Are you able to climb a flight of stairs or walk up a hill? [x] [] 1   Are you able to do heavy work around the house like lifting or moving heavy furniture? [] [x] 1   Do yardwork like raking leaves, weeding or pushing a power mower? [x] [] 1   Participate in strenuous sports like swimming, singles tennis, football, basketball or skiing? [x] [] 1   Total Score: 3         Reviewed history, meds,allergies.  Pt to call provider for any further questions about prep or DOS procedure. Pt instructed to bring ID and insurance on day of procedure. Pt instructed to register in the [facility] on day of procedure.

## 2024-04-26 NOTE — PERIOP NOTE
PAT Activity Status Questionnaire       Yes No Points for YES    Are you able to climb a flight of stairs or walk up a hill? [x] [] 1   Are you able to do heavy work around the house like lifting or moving heavy furniture? [] [x] 1   Do yardwork like raking leaves, weeding or pushing a power mower? [x] [] 1   Participate in strenuous sports like swimming, singles tennis, football, basketball or skiing? [x] [] 1   Total Score: 3       If TOTAL SCORE is <3, send chart for anesthesia review.

## 2024-04-29 ENCOUNTER — ANESTHESIA (OUTPATIENT)
Facility: HOSPITAL | Age: 84
End: 2024-04-29
Payer: MEDICARE

## 2024-04-29 ENCOUNTER — ANESTHESIA EVENT (OUTPATIENT)
Facility: HOSPITAL | Age: 84
End: 2024-04-29
Payer: MEDICARE

## 2024-04-29 ENCOUNTER — HOSPITAL ENCOUNTER (OUTPATIENT)
Facility: HOSPITAL | Age: 84
Setting detail: OUTPATIENT SURGERY
Discharge: HOME | End: 2024-04-29
Attending: INTERNAL MEDICINE | Admitting: INTERNAL MEDICINE
Payer: MEDICARE

## 2024-04-29 VITALS
SYSTOLIC BLOOD PRESSURE: 120 MMHG | BODY MASS INDEX: 23.25 KG/M2 | HEART RATE: 69 BPM | RESPIRATION RATE: 14 BRPM | HEIGHT: 63 IN | WEIGHT: 131.2 LBS | TEMPERATURE: 97.3 F | OXYGEN SATURATION: 98 % | DIASTOLIC BLOOD PRESSURE: 61 MMHG

## 2024-04-29 LAB — GLUCOSE BLD STRIP.AUTO-MCNC: 83 MG/DL (ref 70–110)

## 2024-04-29 PROCEDURE — 6360000002 HC RX W HCPCS: Performed by: NURSE ANESTHETIST, CERTIFIED REGISTERED

## 2024-04-29 PROCEDURE — 3700000000 HC ANESTHESIA ATTENDED CARE: Performed by: INTERNAL MEDICINE

## 2024-04-29 PROCEDURE — 6360000002 HC RX W HCPCS: Performed by: INTERNAL MEDICINE

## 2024-04-29 PROCEDURE — 3700000001 HC ADD 15 MINUTES (ANESTHESIA): Performed by: INTERNAL MEDICINE

## 2024-04-29 PROCEDURE — 7100000010 HC PHASE II RECOVERY - FIRST 15 MIN: Performed by: INTERNAL MEDICINE

## 2024-04-29 PROCEDURE — 2709999900 HC NON-CHARGEABLE SUPPLY: Performed by: INTERNAL MEDICINE

## 2024-04-29 PROCEDURE — 2720000010 HC SURG SUPPLY STERILE: Performed by: INTERNAL MEDICINE

## 2024-04-29 PROCEDURE — 2580000003 HC RX 258: Performed by: INTERNAL MEDICINE

## 2024-04-29 PROCEDURE — 3600007512: Performed by: INTERNAL MEDICINE

## 2024-04-29 PROCEDURE — 2500000003 HC RX 250 WO HCPCS: Performed by: NURSE ANESTHETIST, CERTIFIED REGISTERED

## 2024-04-29 PROCEDURE — 7100000011 HC PHASE II RECOVERY - ADDTL 15 MIN: Performed by: INTERNAL MEDICINE

## 2024-04-29 PROCEDURE — 3600007502: Performed by: INTERNAL MEDICINE

## 2024-04-29 PROCEDURE — 82962 GLUCOSE BLOOD TEST: CPT

## 2024-04-29 PROCEDURE — 43244 EGD VARICES LIGATION: CPT | Performed by: INTERNAL MEDICINE

## 2024-04-29 RX ORDER — SODIUM CHLORIDE 0.9 % (FLUSH) 0.9 %
5-40 SYRINGE (ML) INJECTION EVERY 12 HOURS SCHEDULED
Status: DISCONTINUED | OUTPATIENT
Start: 2024-04-29 | End: 2024-04-29 | Stop reason: HOSPADM

## 2024-04-29 RX ORDER — OXYCODONE HYDROCHLORIDE 5 MG/1
5 TABLET ORAL
Status: DISCONTINUED | OUTPATIENT
Start: 2024-04-29 | End: 2024-04-29 | Stop reason: HOSPADM

## 2024-04-29 RX ORDER — SODIUM CHLORIDE 0.9 % (FLUSH) 0.9 %
5-40 SYRINGE (ML) INJECTION PRN
Status: DISCONTINUED | OUTPATIENT
Start: 2024-04-29 | End: 2024-04-29 | Stop reason: HOSPADM

## 2024-04-29 RX ORDER — NALOXONE HYDROCHLORIDE 0.4 MG/ML
INJECTION, SOLUTION INTRAMUSCULAR; INTRAVENOUS; SUBCUTANEOUS PRN
Status: DISCONTINUED | OUTPATIENT
Start: 2024-04-29 | End: 2024-04-29 | Stop reason: HOSPADM

## 2024-04-29 RX ORDER — ONDANSETRON 2 MG/ML
4 INJECTION INTRAMUSCULAR; INTRAVENOUS
Status: DISCONTINUED | OUTPATIENT
Start: 2024-04-29 | End: 2024-04-29 | Stop reason: HOSPADM

## 2024-04-29 RX ORDER — SODIUM CHLORIDE 9 MG/ML
INJECTION, SOLUTION INTRAVENOUS CONTINUOUS
Status: DISCONTINUED | OUTPATIENT
Start: 2024-04-29 | End: 2024-04-29 | Stop reason: HOSPADM

## 2024-04-29 RX ORDER — SODIUM CHLORIDE 9 MG/ML
INJECTION, SOLUTION INTRAVENOUS PRN
Status: DISCONTINUED | OUTPATIENT
Start: 2024-04-29 | End: 2024-04-29 | Stop reason: HOSPADM

## 2024-04-29 RX ORDER — PROPOFOL 10 MG/ML
INJECTION, EMULSION INTRAVENOUS PRN
Status: DISCONTINUED | OUTPATIENT
Start: 2024-04-29 | End: 2024-04-29 | Stop reason: SDUPTHER

## 2024-04-29 RX ORDER — LIDOCAINE HYDROCHLORIDE 20 MG/ML
INJECTION, SOLUTION EPIDURAL; INFILTRATION; INTRACAUDAL; PERINEURAL PRN
Status: DISCONTINUED | OUTPATIENT
Start: 2024-04-29 | End: 2024-04-29 | Stop reason: SDUPTHER

## 2024-04-29 RX ORDER — FENTANYL CITRATE 50 UG/ML
25 INJECTION, SOLUTION INTRAMUSCULAR; INTRAVENOUS AS NEEDED
Status: DISCONTINUED | OUTPATIENT
Start: 2024-04-29 | End: 2024-04-29 | Stop reason: HOSPADM

## 2024-04-29 RX ORDER — SODIUM CHLORIDE, SODIUM LACTATE, POTASSIUM CHLORIDE, CALCIUM CHLORIDE 600; 310; 30; 20 MG/100ML; MG/100ML; MG/100ML; MG/100ML
INJECTION, SOLUTION INTRAVENOUS CONTINUOUS
Status: DISCONTINUED | OUTPATIENT
Start: 2024-04-29 | End: 2024-04-29 | Stop reason: HOSPADM

## 2024-04-29 RX ORDER — DROPERIDOL 2.5 MG/ML
0.62 INJECTION, SOLUTION INTRAMUSCULAR; INTRAVENOUS
Status: DISCONTINUED | OUTPATIENT
Start: 2024-04-29 | End: 2024-04-29 | Stop reason: HOSPADM

## 2024-04-29 RX ORDER — ONDANSETRON 2 MG/ML
2 INJECTION INTRAMUSCULAR; INTRAVENOUS AS NEEDED
Status: DISCONTINUED | OUTPATIENT
Start: 2024-04-29 | End: 2024-04-29 | Stop reason: HOSPADM

## 2024-04-29 RX ORDER — LABETALOL HYDROCHLORIDE 5 MG/ML
10 INJECTION, SOLUTION INTRAVENOUS
Status: DISCONTINUED | OUTPATIENT
Start: 2024-04-29 | End: 2024-04-29 | Stop reason: HOSPADM

## 2024-04-29 RX ORDER — DIPHENHYDRAMINE HYDROCHLORIDE 50 MG/ML
12.5 INJECTION INTRAMUSCULAR; INTRAVENOUS
Status: DISCONTINUED | OUTPATIENT
Start: 2024-04-29 | End: 2024-04-29 | Stop reason: HOSPADM

## 2024-04-29 RX ADMIN — PROPOFOL 30 MG: 10 INJECTION, EMULSION INTRAVENOUS at 10:07

## 2024-04-29 RX ADMIN — FENTANYL CITRATE 25 MCG: 50 INJECTION INTRAMUSCULAR; INTRAVENOUS at 11:22

## 2024-04-29 RX ADMIN — LIDOCAINE HYDROCHLORIDE 60 MG: 20 INJECTION, SOLUTION EPIDURAL; INFILTRATION; INTRACAUDAL; PERINEURAL at 10:00

## 2024-04-29 RX ADMIN — PROPOFOL 30 MG: 10 INJECTION, EMULSION INTRAVENOUS at 10:05

## 2024-04-29 RX ADMIN — PROPOFOL 30 MG: 10 INJECTION, EMULSION INTRAVENOUS at 10:03

## 2024-04-29 RX ADMIN — SODIUM CHLORIDE: 9 INJECTION, SOLUTION INTRAVENOUS at 08:57

## 2024-04-29 RX ADMIN — PROPOFOL 60 MG: 10 INJECTION, EMULSION INTRAVENOUS at 10:01

## 2024-04-29 ASSESSMENT — PAIN - FUNCTIONAL ASSESSMENT
PAIN_FUNCTIONAL_ASSESSMENT: 0-10
PAIN_FUNCTIONAL_ASSESSMENT: ACTIVITIES ARE NOT PREVENTED
PAIN_FUNCTIONAL_ASSESSMENT: 0-10
PAIN_FUNCTIONAL_ASSESSMENT: PREVENTS OR INTERFERES SOME ACTIVE ACTIVITIES AND ADLS

## 2024-04-29 ASSESSMENT — PAIN SCALES - GENERAL: PAINLEVEL_OUTOF10: 8

## 2024-04-29 ASSESSMENT — PAIN DESCRIPTION - LOCATION: LOCATION: ABDOMEN

## 2024-04-29 ASSESSMENT — PAIN DESCRIPTION - DESCRIPTORS
DESCRIPTORS: DISCOMFORT
DESCRIPTORS: SHARP;PRESSURE

## 2024-04-29 NOTE — OP NOTE
[facility]      Al Hunt MD, FACP, FACG, FAASLD      Shannen Carrillo, MIKE Piedra, [facility]   Fidelina Reynoldsfredpayam, [facility]   Rowan Saeid, [facility]-  Chuy Camarena, [facility]   Maisha Gallagher, [facility]   Olamide Oneill, [facility]   [address], [address]   [place]  [address] [phone]   FAX: [phone]  [facility]   [address], [address]   [place]  [address] [phone]   FAX: [phone]          UPPER ENDOSCOPY WITH BANDING OF ESOPHAGEAL VARICES PROCEDURE NOTE    NAME: Eileen Pichardo  :  1940  MRN:  [id number]      INDICATION: Cirrhosis.  Screening for esophageal varices with variceal ligation if indicated.    : Al Hunt MD    SURGICAL ASSISTANT:  None    PROSTHETIC DEVISES, TISSUE GRAFTS, ORGAN TRANSPLANTS:  Not applicable     ANESTHESIA/SEDATION: Propofol was administered by anesthesia      PROCEDURE DESCRIPTION:  Informed consent was obtained from the patient for the procedure.  All risks and benefits of the procedure explained.     The procedure was performed in the endoscopy suite.  The patient was laying on a stretcher and moved to the left lateral decubitus position prior to administration of sedation.    Sedation was administered by anesthesiology.  See their note for details.      The endoscope was inserted into the mouth and advanced under direct vision to the second portion of the duodenum.  Careful inspection of upper gastrointestinal tract was made as the endoscope was inserted and withdrawn.  Retroflexion of the endoscope to view of the cardia of the stomach was performed.  After withdrawing the endoscope the banding devise was placed on the tip of the endoscope.  The scope was then reinserted under direct inspection and advanced

## 2024-04-29 NOTE — ANESTHESIA PRE PROCEDURE
Department of Anesthesiology  Preprocedure Note       Name:  Eileen Pichardo   Age:  83 y.o.  :  1940                                          MRN:  [id number]         Date:  2024      Surgeon: Surgeon(s):  Al Hunt MD    Procedure: Procedure(s):  ESOPHAGOGASTRODUODENOSCOPY    Medications prior to admission:   Prior to Admission medications    Medication Sig Start Date End Date Taking? Authorizing Provider   allopurinol (ZYLOPRIM) 100 MG tablet  24   ProviderAnna Marie MD   furosemide (LASIX) 40 MG tablet Take 1 tablet by mouth daily 24   Chuy Camarena APRN - CNP   spironolactone (ALDACTONE) 50 MG tablet Take 2 tablets by mouth daily 24   Chuy Camarena APRN - CNP   ferrous sulfate 324 (65 Fe) MG EC tablet Take 1 tablet by mouth daily (with breakfast) 10/25/23   Chuy Camarena APRN - CNP   cyanocobalamin 1000 MCG tablet Take 1 tablet by mouth daily    ProviderAnna Marie MD   allopurinol (ZYLOPRIM) 300 MG tablet Take 100 mg by mouth daily    Automatic Reconciliation, Ar   atorvastatin (LIPITOR) 10 MG tablet Take by mouth daily    Automatic Reconciliation, Ar   Cholecalciferol 50 MCG (2000 UT) TABS Take 2,000 Units by mouth daily 22  Automatic Reconciliation, Ar   levothyroxine (SYNTHROID) 125 MCG tablet Take 1 tablet by mouth every morning (before breakfast) Pt states take 1/2 tab  and     Automatic Reconciliation, Ar   nadolol (CORGARD) 40 MG tablet ceived the following from [facility]: Outside name: nadoloL (CORGARD) 40 mg tablet 8/3/22   Automatic Reconciliation, Ar   omeprazole (PRILOSEC) 20 MG delayed release capsule Take 1 capsule by mouth daily    Automatic Reconciliation, Ar       Current medications:    Current Facility-Administered Medications   Medication Dose Route Frequency Provider Last Rate Last Admin   • 0.9 % sodium chloride infusion   IntraVENous Continuous Al Hunt MD

## 2024-04-29 NOTE — DISCHARGE INSTRUCTIONS
[facility]      Al Hunt MD, FACP, FACG, FAASLD      MIKE Luevano, [facility]   Fidelina Reynoldsfredpayam, [facility]   Rowan Saeid, [facility]  Chuy Camarena, [facility]   Maisha Gallagher, [facility]   Olamide Garciaon, [facility]   [address], [address]   [place]  [address] [phone]   FAX: [phone]  [facility]   [address], [address]   [place]  [address] [phone]   FAX: [phone]         ENDOSCOPY WITH BANDING DISCHARGE INSTRUCTIONS    Eileen Pichardo    1940  Date: 4/29/2024    DISCOMFORT:  Use lozenges or warm salt water gargle for sore thoat  Apply warm compress to IV site if red.  If redness or soreness persists call the office.  You may experience gas and bloating.  Walking and belching will help relieve this.  You may experience chest pain or discomfort or feel as though food is \"sticking\" in your food pipe for a few days after the procedure. This is a normal feeling after banding of esophageal varices.    CHEST PRESSURE:  Banding of dilated veins (varices) in the food tube (esophagus) can be painful in some persons.  The pain is caused by squeezing the varices and lining of the esophagus by the bands used to seal the varices.  You can take liquid pain medication either acetaminophen or alternative.  The best treatment for this is to drink a an \"Icee\" or \"Slurpee\" since the ice crystals will freeze the nerve endings in the food tube and relieve the pain.    DIET:  Regular food may dislodge the bands placed on the varices.  For this reason you should only have liquid for the rest of today.  Eat only soft food that does not need to be chewed all day tomorrow.  You may advance to your regular diet in 2 days.    ACTIVITY:  Spend the remainder of the day

## 2024-04-29 NOTE — H&P
[facility]      Al Hunt MD, FACP, FACG, FAASLD      MIKE Luevano, [facility]   Fidelina Dinesh, [facility]   Rowan Breaux, [facility]-  Chuy Camarena, [facility]   Maisha Gallagher, [facility]   Olamide Aston, [facility]   [address], [address]   [place]  [address] [phone]   FAX: [phone]  [facility]   [address], [address]   [place]  [address] [phone]   FAX: [phone]         PRE-PROCEDURE NOTE - EGD    H and P from last office visit reviewed.    Allergies reviewed.  Out-patient medicaton list reviewed.    Patient Active Problem List   Diagnosis    Gout    S/P cholecystectomy    GERD (gastroesophageal reflux disease)    Hypertension    Hypothyroidism    MANNING (nonalcoholic steatohepatitis)    UTI (urinary tract infection)    S/P OCTAVIA (total abdominal hysterectomy)    Female cystocele    Diabetes mellitus (HCC)         Allergies   Allergen Reactions    Ciprofloxacin Itching, Hives and Rash     \"scalp burns and body itches\"    Nitrofurantoin Other (See Comments) and Anaphylaxis     Scarred lungs    Other reaction(s): pulmonary fibrosis    Lung damage    Sulfa Antibiotics Hives     Other reaction(s): pulmonary fibrosis    Tetanus Immune Globulin Swelling    5ht3 Receptor Antagonists Other (See Comments)     fever    Tetanus Toxoid Rash and Swelling       No current facility-administered medications on file prior to encounter.     Current Outpatient Medications on File Prior to Encounter   Medication Sig Dispense Refill    furosemide (LASIX) 40 MG tablet Take 1 tablet by mouth daily 90 tablet 3    spironolactone (ALDACTONE) 50 MG tablet Take 2 tablets by mouth daily 90 tablet 3    ferrous sulfate 324 (65 Fe) MG EC tablet Take 1 tablet by mouth daily (with

## 2024-04-29 NOTE — ANESTHESIA POSTPROCEDURE EVALUATION
Department of Anesthesiology  Postprocedure Note    Patient: Eileen Pichardo  MRN: [id number]  YOB: 1940  Date of evaluation: 4/29/2024    Procedure Summary       Date: 04/29/24 Room / Location: [facility] / [facility] ENDOSCOPY    Anesthesia Start: 0955 Anesthesia Stop: 1022    Procedure: ESOPHAGOGASTRODUODENOSCOPY (Upper GI Region) Diagnosis:       MANNING (nonalcoholic steatohepatitis)      (MANNING (nonalcoholic steatohepatitis) [K75.81])    Surgeons: Al Hunt MD Responsible Provider: Sebas Schneider MD    Anesthesia Type: MAC ASA Status: 3            Anesthesia Type: MAC    Adriano Phase I: Adriano Score: 10    Adriano Phase II: Adriano Score: 8    Anesthesia Post Evaluation    Post-Anesthesia Evaluation and Assessment    Cardiovascular Function/Vital Signs  Visit Vitals  /61   Pulse 78   Temp 36.3 °C (97.3 °F) (Oral)   Resp 15   Ht 1.6 m (5' 3\")   Wt 59.5 kg (131 lb 3.2 oz)   SpO2 96%   BMI 23.24 kg/m²       Patient is status post Procedure(s):  ESOPHAGOGASTRODUODENOSCOPY.    Nausea/Vomiting: Controlled.    Postoperative hydration reviewed and adequate.    Pain:      Managed.    Neurological Status:       At baseline.    Mental Status and Level of Consciousness: Baseline and appropriate for discharge.    Pulmonary Status:       Adequate oxygenation and airway patent.    Complications related to anesthesia: None    Post-anesthesia assessment completed. No concerns.    Patient has met all discharge requirements.    Signed By: Lyudmila Orellana, APRN - CRNA    April 29, 2024

## 2024-07-23 ENCOUNTER — HOSPITAL ENCOUNTER (OUTPATIENT)
Facility: HOSPITAL | Age: 84
Setting detail: SPECIMEN
Discharge: HOME | End: 2024-07-26
Payer: MEDICARE

## 2024-07-23 ENCOUNTER — OFFICE VISIT (OUTPATIENT)
Age: 84
End: 2024-07-23
Payer: MEDICARE

## 2024-07-23 VITALS
DIASTOLIC BLOOD PRESSURE: 56 MMHG | TEMPERATURE: 97 F | BODY MASS INDEX: 23.5 KG/M2 | SYSTOLIC BLOOD PRESSURE: 102 MMHG | OXYGEN SATURATION: 98 % | HEIGHT: 63 IN | HEART RATE: 59 BPM | WEIGHT: 132.6 LBS

## 2024-07-23 DIAGNOSIS — K74.60 CIRRHOSIS OF LIVER WITHOUT ASCITES, UNSPECIFIED HEPATIC CIRRHOSIS TYPE (HCC): ICD-10-CM

## 2024-07-23 DIAGNOSIS — K74.60 CIRRHOSIS OF LIVER WITHOUT ASCITES, UNSPECIFIED HEPATIC CIRRHOSIS TYPE (HCC): Primary | ICD-10-CM

## 2024-07-23 LAB
ALBUMIN SERPL-MCNC: 2.9 G/DL (ref 3.4–5)
ALBUMIN/GLOB SERPL: 0.7 (ref 0.8–1.7)
ALP SERPL-CCNC: 94 U/L (ref 45–117)
ALT SERPL-CCNC: 19 U/L (ref 13–56)
ANION GAP SERPL CALC-SCNC: 4 MMOL/L (ref 3–18)
AST SERPL-CCNC: 27 U/L (ref 10–38)
BASOPHILS # BLD: 0 K/UL (ref 0–0.1)
BASOPHILS NFR BLD: 1 % (ref 0–2)
BILIRUB DIRECT SERPL-MCNC: 0.3 MG/DL (ref 0–0.2)
BILIRUB SERPL-MCNC: 0.9 MG/DL (ref 0.2–1)
BUN SERPL-MCNC: 21 MG/DL (ref 7–18)
BUN/CREAT SERPL: 22 (ref 12–20)
CALCIUM SERPL-MCNC: 8.6 MG/DL (ref 8.5–10.1)
CHLORIDE SERPL-SCNC: 101 MMOL/L (ref 100–111)
CO2 SERPL-SCNC: 32 MMOL/L (ref 21–32)
CREAT SERPL-MCNC: 0.96 MG/DL (ref 0.6–1.3)
DIFFERENTIAL METHOD BLD: ABNORMAL
EOSINOPHIL # BLD: 0.2 K/UL (ref 0–0.4)
EOSINOPHIL NFR BLD: 5 % (ref 0–5)
ERYTHROCYTE [DISTWIDTH] IN BLOOD BY AUTOMATED COUNT: 15 % (ref 11.6–14.5)
FERRITIN SERPL-MCNC: 25 NG/ML (ref 8–388)
GLOBULIN SER CALC-MCNC: 3.9 G/DL (ref 2–4)
GLUCOSE SERPL-MCNC: 120 MG/DL (ref 74–99)
HCT VFR BLD AUTO: 34.4 % (ref 35–45)
HGB BLD-MCNC: 11.2 G/DL (ref 12–16)
IMM GRANULOCYTES # BLD AUTO: 0 K/UL (ref 0–0.04)
IMM GRANULOCYTES NFR BLD AUTO: 0 % (ref 0–0.5)
INR PPP: 1.3 (ref 0.9–1.1)
IRON SATN MFR SERPL: 24 % (ref 20–50)
IRON SERPL-MCNC: 69 UG/DL (ref 50–175)
LYMPHOCYTES # BLD: 0.7 K/UL (ref 0.9–3.6)
LYMPHOCYTES NFR BLD: 15 % (ref 21–52)
MCH RBC QN AUTO: 31.5 PG (ref 24–34)
MCHC RBC AUTO-ENTMCNC: 32.6 G/DL (ref 31–37)
MCV RBC AUTO: 96.6 FL (ref 78–100)
MONOCYTES # BLD: 0.4 K/UL (ref 0.05–1.2)
MONOCYTES NFR BLD: 9 % (ref 3–10)
NEUTS SEG # BLD: 3.3 K/UL (ref 1.8–8)
NEUTS SEG NFR BLD: 70 % (ref 40–73)
NRBC # BLD: 0 K/UL (ref 0–0.01)
NRBC BLD-RTO: 0 PER 100 WBC
PLATELET # BLD AUTO: 105 K/UL (ref 135–420)
PMV BLD AUTO: 12.2 FL (ref 9.2–11.8)
POTASSIUM SERPL-SCNC: 3.8 MMOL/L (ref 3.5–5.5)
PROT SERPL-MCNC: 6.8 G/DL (ref 6.4–8.2)
PROTHROMBIN TIME: 16.5 SEC (ref 11.9–14.9)
RBC # BLD AUTO: 3.56 M/UL (ref 4.2–5.3)
SODIUM SERPL-SCNC: 137 MMOL/L (ref 136–145)
TIBC SERPL-MCNC: 289 UG/DL (ref 250–450)
WBC # BLD AUTO: 4.7 K/UL (ref 4.6–13.2)

## 2024-07-23 PROCEDURE — G8400 PT W/DXA NO RESULTS DOC: HCPCS | Performed by: NURSE PRACTITIONER

## 2024-07-23 PROCEDURE — 80076 HEPATIC FUNCTION PANEL: CPT

## 2024-07-23 PROCEDURE — 99214 OFFICE O/P EST MOD 30 MIN: CPT | Performed by: NURSE PRACTITIONER

## 2024-07-23 PROCEDURE — 85025 COMPLETE CBC W/AUTO DIFF WBC: CPT

## 2024-07-23 PROCEDURE — 83540 ASSAY OF IRON: CPT

## 2024-07-23 PROCEDURE — 83550 IRON BINDING TEST: CPT

## 2024-07-23 PROCEDURE — 3078F DIAST BP <80 MM HG: CPT | Performed by: NURSE PRACTITIONER

## 2024-07-23 PROCEDURE — G8420 CALC BMI NORM PARAMETERS: HCPCS | Performed by: NURSE PRACTITIONER

## 2024-07-23 PROCEDURE — 80048 BASIC METABOLIC PNL TOTAL CA: CPT

## 2024-07-23 PROCEDURE — G8427 DOCREV CUR MEDS BY ELIG CLIN: HCPCS | Performed by: NURSE PRACTITIONER

## 2024-07-23 PROCEDURE — 82107 ALPHA-FETOPROTEIN L3: CPT

## 2024-07-23 PROCEDURE — 36415 COLL VENOUS BLD VENIPUNCTURE: CPT

## 2024-07-23 PROCEDURE — 1036F TOBACCO NON-USER: CPT | Performed by: NURSE PRACTITIONER

## 2024-07-23 PROCEDURE — 85610 PROTHROMBIN TIME: CPT

## 2024-07-23 PROCEDURE — 82728 ASSAY OF FERRITIN: CPT

## 2024-07-23 PROCEDURE — 3074F SYST BP LT 130 MM HG: CPT | Performed by: NURSE PRACTITIONER

## 2024-07-23 PROCEDURE — 1090F PRES/ABSN URINE INCON ASSESS: CPT | Performed by: NURSE PRACTITIONER

## 2024-07-23 PROCEDURE — 1123F ACP DISCUSS/DSCN MKR DOCD: CPT | Performed by: NURSE PRACTITIONER

## 2024-07-23 RX ORDER — BLOOD SUGAR DIAGNOSTIC
STRIP MISCELLANEOUS
COMMUNITY
Start: 2024-05-16

## 2024-07-23 RX ORDER — CEFUROXIME AXETIL 500 MG/1
500 TABLET ORAL 2 TIMES DAILY
COMMUNITY
Start: 2024-07-02 | End: 2024-07-23

## 2024-07-23 RX ORDER — AZITHROMYCIN 250 MG/1
TABLET, FILM COATED ORAL
COMMUNITY
Start: 2024-07-02 | End: 2024-07-23

## 2024-07-23 NOTE — PROGRESS NOTES
lobular area arising from the medial segment left hepatic lobe (image 88), has been seen before but appears somewhat more prominent on today's study.    OTHER TESTING:  Not available or performed    FOLLOW-UP:  All of the issues listed above in the Assessment and Plan were discussed with the patient.  All questions were answered.  The patient expressed a clear understanding of the above.    Follow up in 3 months for continued monitoring.  This will be after the EGD.      Chuy Camarena, CLARKP-C  [facility] [address], [address]   [place] [address] [phone]

## 2024-07-26 LAB
AFP L3 MFR SERPL: NORMAL % (ref 0–9.9)
AFP SERPL-MCNC: 1.3 NG/ML (ref 0–8.7)

## 2024-07-29 ENCOUNTER — HOSPITAL ENCOUNTER (OUTPATIENT)
Facility: HOSPITAL | Age: 84
Setting detail: OUTPATIENT SURGERY
Discharge: HOME | End: 2024-07-29
Attending: INTERNAL MEDICINE | Admitting: INTERNAL MEDICINE
Payer: MEDICARE

## 2024-07-29 ENCOUNTER — ANESTHESIA (OUTPATIENT)
Facility: HOSPITAL | Age: 84
End: 2024-07-29
Payer: MEDICARE

## 2024-07-29 ENCOUNTER — ANESTHESIA EVENT (OUTPATIENT)
Facility: HOSPITAL | Age: 84
End: 2024-07-29
Payer: MEDICARE

## 2024-07-29 VITALS
HEIGHT: 63 IN | DIASTOLIC BLOOD PRESSURE: 53 MMHG | TEMPERATURE: 97.6 F | RESPIRATION RATE: 18 BRPM | SYSTOLIC BLOOD PRESSURE: 108 MMHG | HEART RATE: 71 BPM | OXYGEN SATURATION: 95 % | WEIGHT: 131.3 LBS | BODY MASS INDEX: 23.27 KG/M2

## 2024-07-29 LAB — GLUCOSE BLD STRIP.AUTO-MCNC: 172 MG/DL (ref 70–110)

## 2024-07-29 PROCEDURE — 3600007502: Performed by: INTERNAL MEDICINE

## 2024-07-29 PROCEDURE — 7100000010 HC PHASE II RECOVERY - FIRST 15 MIN: Performed by: INTERNAL MEDICINE

## 2024-07-29 PROCEDURE — C1889 IMPLANT/INSERT DEVICE, NOC: HCPCS | Performed by: INTERNAL MEDICINE

## 2024-07-29 PROCEDURE — 2580000003 HC RX 258: Performed by: INTERNAL MEDICINE

## 2024-07-29 PROCEDURE — 3700000000 HC ANESTHESIA ATTENDED CARE: Performed by: INTERNAL MEDICINE

## 2024-07-29 PROCEDURE — 43255 EGD CONTROL BLEEDING ANY: CPT | Performed by: INTERNAL MEDICINE

## 2024-07-29 PROCEDURE — 3700000001 HC ADD 15 MINUTES (ANESTHESIA): Performed by: INTERNAL MEDICINE

## 2024-07-29 PROCEDURE — 43244 EGD VARICES LIGATION: CPT | Performed by: INTERNAL MEDICINE

## 2024-07-29 PROCEDURE — 2709999900 HC NON-CHARGEABLE SUPPLY: Performed by: INTERNAL MEDICINE

## 2024-07-29 PROCEDURE — 7100000011 HC PHASE II RECOVERY - ADDTL 15 MIN: Performed by: INTERNAL MEDICINE

## 2024-07-29 PROCEDURE — 2500000003 HC RX 250 WO HCPCS: Performed by: NURSE ANESTHETIST, CERTIFIED REGISTERED

## 2024-07-29 PROCEDURE — 6360000002 HC RX W HCPCS: Performed by: INTERNAL MEDICINE

## 2024-07-29 PROCEDURE — 6360000002 HC RX W HCPCS: Performed by: NURSE ANESTHETIST, CERTIFIED REGISTERED

## 2024-07-29 PROCEDURE — 2720000010 HC SURG SUPPLY STERILE: Performed by: INTERNAL MEDICINE

## 2024-07-29 PROCEDURE — 3600007512: Performed by: INTERNAL MEDICINE

## 2024-07-29 PROCEDURE — 82962 GLUCOSE BLOOD TEST: CPT

## 2024-07-29 DEVICE — CLIP ASSURANCE 16MM: Type: IMPLANTABLE DEVICE | Site: STOMACH | Status: FUNCTIONAL

## 2024-07-29 RX ORDER — FENTANYL CITRATE 50 UG/ML
INJECTION, SOLUTION INTRAMUSCULAR; INTRAVENOUS
Status: DISCONTINUED
Start: 2024-07-29 | End: 2024-07-29 | Stop reason: HOSPADM

## 2024-07-29 RX ORDER — SUCRALFATE ORAL 1 G/10ML
1 SUSPENSION ORAL 4 TIMES DAILY
Qty: 1200 ML | Refills: 3 | Status: SHIPPED | OUTPATIENT
Start: 2024-07-29

## 2024-07-29 RX ORDER — LIDOCAINE HYDROCHLORIDE 20 MG/ML
INJECTION, SOLUTION EPIDURAL; INFILTRATION; INTRACAUDAL; PERINEURAL PRN
Status: DISCONTINUED | OUTPATIENT
Start: 2024-07-29 | End: 2024-07-29 | Stop reason: SDUPTHER

## 2024-07-29 RX ORDER — PROPOFOL 10 MG/ML
INJECTION, EMULSION INTRAVENOUS PRN
Status: DISCONTINUED | OUTPATIENT
Start: 2024-07-29 | End: 2024-07-29 | Stop reason: SDUPTHER

## 2024-07-29 RX ORDER — FENTANYL CITRATE 50 UG/ML
25 INJECTION, SOLUTION INTRAMUSCULAR; INTRAVENOUS AS NEEDED
Status: DISCONTINUED | OUTPATIENT
Start: 2024-07-29 | End: 2024-07-29 | Stop reason: HOSPADM

## 2024-07-29 RX ORDER — ONDANSETRON 2 MG/ML
2 INJECTION INTRAMUSCULAR; INTRAVENOUS AS NEEDED
Status: DISCONTINUED | OUTPATIENT
Start: 2024-07-29 | End: 2024-07-29 | Stop reason: HOSPADM

## 2024-07-29 RX ORDER — SODIUM CHLORIDE 0.9 % (FLUSH) 0.9 %
5-40 SYRINGE (ML) INJECTION EVERY 12 HOURS SCHEDULED
Status: DISCONTINUED | OUTPATIENT
Start: 2024-07-29 | End: 2024-07-29 | Stop reason: HOSPADM

## 2024-07-29 RX ORDER — SODIUM CHLORIDE 9 MG/ML
INJECTION, SOLUTION INTRAVENOUS PRN
Status: DISCONTINUED | OUTPATIENT
Start: 2024-07-29 | End: 2024-07-29 | Stop reason: HOSPADM

## 2024-07-29 RX ORDER — SODIUM CHLORIDE 0.9 % (FLUSH) 0.9 %
5-40 SYRINGE (ML) INJECTION PRN
Status: DISCONTINUED | OUTPATIENT
Start: 2024-07-29 | End: 2024-07-29 | Stop reason: HOSPADM

## 2024-07-29 RX ADMIN — PROPOFOL 20 MG: 10 INJECTION, EMULSION INTRAVENOUS at 08:22

## 2024-07-29 RX ADMIN — PROPOFOL 20 MG: 10 INJECTION, EMULSION INTRAVENOUS at 08:25

## 2024-07-29 RX ADMIN — PROPOFOL 10 MG: 10 INJECTION, EMULSION INTRAVENOUS at 08:37

## 2024-07-29 RX ADMIN — LIDOCAINE HYDROCHLORIDE 60 MG: 20 INJECTION, SOLUTION EPIDURAL; INFILTRATION; INTRACAUDAL; PERINEURAL at 08:21

## 2024-07-29 RX ADMIN — PROPOFOL 20 MG: 10 INJECTION, EMULSION INTRAVENOUS at 08:29

## 2024-07-29 RX ADMIN — PROPOFOL 60 MG: 10 INJECTION, EMULSION INTRAVENOUS at 08:21

## 2024-07-29 RX ADMIN — ONDANSETRON 2 MG: 2 INJECTION INTRAMUSCULAR; INTRAVENOUS at 09:42

## 2024-07-29 RX ADMIN — ONDANSETRON 2 MG: 2 INJECTION INTRAMUSCULAR; INTRAVENOUS at 09:13

## 2024-07-29 RX ADMIN — PROPOFOL 20 MG: 10 INJECTION, EMULSION INTRAVENOUS at 08:23

## 2024-07-29 RX ADMIN — PROPOFOL 10 MG: 10 INJECTION, EMULSION INTRAVENOUS at 08:34

## 2024-07-29 RX ADMIN — FENTANYL CITRATE 25 MCG: 50 INJECTION INTRAMUSCULAR; INTRAVENOUS at 09:16

## 2024-07-29 RX ADMIN — PROPOFOL 20 MG: 10 INJECTION, EMULSION INTRAVENOUS at 08:27

## 2024-07-29 RX ADMIN — PROPOFOL 10 MG: 10 INJECTION, EMULSION INTRAVENOUS at 08:31

## 2024-07-29 RX ADMIN — FENTANYL CITRATE 25 MCG: 50 INJECTION INTRAMUSCULAR; INTRAVENOUS at 09:41

## 2024-07-29 RX ADMIN — SODIUM CHLORIDE: 9 INJECTION, SOLUTION INTRAVENOUS at 07:57

## 2024-07-29 ASSESSMENT — PAIN DESCRIPTION - LOCATION: LOCATION: CHEST

## 2024-07-29 ASSESSMENT — PAIN DESCRIPTION - ORIENTATION: ORIENTATION: MID

## 2024-07-29 ASSESSMENT — PAIN - FUNCTIONAL ASSESSMENT
PAIN_FUNCTIONAL_ASSESSMENT: 0-10
PAIN_FUNCTIONAL_ASSESSMENT: ADULT NONVERBAL PAIN SCALE (NPVS)
PAIN_FUNCTIONAL_ASSESSMENT: 0-10

## 2024-07-29 ASSESSMENT — PAIN SCALES - GENERAL: PAINLEVEL_OUTOF10: 8

## 2024-07-29 NOTE — H&P
[facility]      Al Hunt MD, FACP, FACG, FAASLD      MIKE Luevano, [facility]   Fidelina Dinesh, [facility]   Rowan Breaux, [facility]  Chuy Camarena, [facility]   Maisha Gallagher, [facility]   Olamide Aston, [facility]   [address], [address]   [place]  [address] [phone]   FAX: [phone]  [facility]   [address]   [place]  [address] [phone]   FAX: [phone]         PRE-PROCEDURE NOTE - EGD    H and P from last office visit reviewed.    Allergies reviewed.  Out-patient medicaton list reviewed.    Patient Active Problem List   Diagnosis    Gout    S/P cholecystectomy    GERD (gastroesophageal reflux disease)    Hypertension    Hypothyroidism    MANNING (nonalcoholic steatohepatitis)    UTI (urinary tract infection)    S/P OCTAVIA (total abdominal hysterectomy)    Female cystocele    Diabetes mellitus (HCC)         Allergies   Allergen Reactions    Ciprofloxacin Itching, Hives and Rash     \"scalp burns and body itches\"    Nitrofurantoin Other (See Comments) and Anaphylaxis     Scarred lungs    Other reaction(s): pulmonary fibrosis    Lung damage    Sulfa Antibiotics Hives     Other reaction(s): pulmonary fibrosis    Tetanus Immune Globulin Swelling    Tetanus Toxoid Rash and Swelling       No current facility-administered medications on file prior to encounter.     Current Outpatient Medications on File Prior to Encounter   Medication Sig Dispense Refill    allopurinol (ZYLOPRIM) 100 MG tablet       furosemide (LASIX) 40 MG tablet Take 1 tablet by mouth daily 90 tablet 3    spironolactone (ALDACTONE) 50 MG tablet Take 2 tablets by mouth daily 90 tablet 3    ferrous sulfate 324 (65 Fe) MG EC tablet Take 1 tablet by mouth daily (with breakfast) 90

## 2024-07-29 NOTE — ANESTHESIA POSTPROCEDURE EVALUATION
Department of Anesthesiology  Postprocedure Note    Patient: Eileen Pichardo  MRN: [id number]  YOB: 1940  Date of evaluation: 7/29/2024    Procedure Summary       Date: 07/29/24 Room / Location: [place] [address] / [facility] ENDOSCOPY    Anesthesia Start: 0814 Anesthesia Stop: 0845    Procedure: ESOPHAGOGASTRODUODENOSCOPY; CLIP X 1 TO OOZING SITE FROM PORTAL GASTROPATHY; BANDING X 7 (Upper GI Region) Diagnosis:       MANNING (nonalcoholic steatohepatitis)      (MANNING (nonalcoholic steatohepatitis) [K75.81])    Surgeons: Al Hunt MD Responsible Provider: Amari Bruce MD    Anesthesia Type: MAC ASA Status: 3            Anesthesia Type: MAC    Adriano Phase I: Adriano Score: 10    Adriano Phase II: Adriano Score: 10    Anesthesia Post Evaluation    Patient location during evaluation: PACU  Patient participation: complete - patient participated  Level of consciousness: awake  Pain score: 0  Airway patency: patent  Nausea & Vomiting: no nausea and no vomiting  Cardiovascular status: blood pressure returned to baseline  Respiratory status: spontaneous ventilation and nonlabored ventilation  Hydration status: stable  Pain management: adequate    No notable events documented.

## 2024-07-29 NOTE — ANESTHESIA PRE PROCEDURE
Department of Anesthesiology  Preprocedure Note       Name:  Eileen Pichardo   Age:  84 y.o.  :  1940                                          MRN:  [id number]         Date:  2024      Surgeon: Surgeon(s):  Al Hunt MD    Procedure: Procedure(s):  ESOPHAGOGASTRODUODENOSCOPY    Medications prior to admission:   Prior to Admission medications    Medication Sig Start Date End Date Taking? Authorizing Provider   PRECISION XTRA TEST STRIPS strip  24   Anna Marie Reddy MD   allopurinol (ZYLOPRIM) 100 MG tablet  24   Anna Marie Reddy MD   furosemide (LASIX) 40 MG tablet Take 1 tablet by mouth daily 24   Chuy Camarena APRN - CNP   spironolactone (ALDACTONE) 50 MG tablet Take 2 tablets by mouth daily 24   Chuy Camarena APRN - CNP   ferrous sulfate 324 (65 Fe) MG EC tablet Take 1 tablet by mouth daily (with breakfast) 10/25/23   Chuy Camarena APRN - CNP   cyanocobalamin 1000 MCG tablet Take 1 tablet by mouth daily    ProviderAnna Marie MD   atorvastatin (LIPITOR) 10 MG tablet Take by mouth daily    Automatic Reconciliation, Ar   Cholecalciferol 50 MCG ( UT) TABS Take 1 tablet by mouth daily 22  Automatic Reconciliation, Ar   levothyroxine (SYNTHROID) 125 MCG tablet Take 1 tablet by mouth every morning (before breakfast) Pt states take 1/2 tab  and     Automatic Reconciliation, Ar   nadolol (CORGARD) 40 MG tablet ceived the following from [facility]: Outside name: nadoloL (CORGARD) 40 mg tablet 8/3/22   Automatic Reconciliation, Ar   omeprazole (PRILOSEC) 20 MG delayed release capsule Take 1 capsule by mouth daily    Automatic Reconciliation, Ar       Current medications:    Current Facility-Administered Medications   Medication Dose Route Frequency Provider Last Rate Last Admin    sodium chloride flush 0.9 % injection 5-40 mL  5-40 mL IntraVENous 2 times per day Al Hunt MD

## 2024-07-29 NOTE — OP NOTE
[facility]      Al Hunt MD, FACP, FACG, FAASLD      Shannen Carrillo, MIKE Piedra, [facility]   Fidelina Reynoldsfredpayam, [facility]   Rowan Saeid, [facility]  Chuy Camarena, [facility]   Maisha Gallagher, [facility]   Olamide Oneill, [facility]   [address], [address]   [place]  [address] [phone]   FAX: [phone]  [facility]   [address]   [place]  [address] [phone]   FAX: [phone]          UPPER ENDOSCOPY WITH BANDING OF ESOPHAGEAL VARICES PROCEDURE NOTE    NAME: Eileen Pichardo  :  1940  MRN:  [id number]      INDICATION: Cirrhosis.  Screening for esophageal varices with variceal ligation if indicated.    : Al Hunt MD    SURGICAL ASSISTANT:  None    PROSTHETIC DEVISES, TISSUE GRAFTS, ORGAN TRANSPLANTS:  Not applicable     ANESTHESIA/SEDATION: Propofol was administered by anesthesia      PROCEDURE DESCRIPTION:  Informed consent was obtained from the patient for the procedure.  All risks and benefits of the procedure explained.     The procedure was performed in the endoscopy suite.  The patient was laying on a stretcher and moved to the left lateral decubitus position prior to administration of sedation.    Sedation was administered by anesthesiology.  See their note for details.      The endoscope was inserted into the mouth and advanced under direct vision to the second portion of the duodenum.  Careful inspection of upper gastrointestinal tract was made as the endoscope was inserted and withdrawn.  Retroflexion of the endoscope to view of the cardia of the stomach was performed.  After withdrawing the endoscope the banding devise was placed on the tip of the endoscope.  The scope was then reinserted under direct inspection and advanced

## 2024-07-29 NOTE — DISCHARGE INSTRUCTIONS
[facility]      Al Hunt MD, FACP, FACG, FAASLD      MIKE Luevano, [facility]   Fidelina Reynoldsfredpayam, [facility]   Rowan Saeid, [facility]  Chuy Camarena, [facility]   Maisha Gallagher, [facility]   Olamide Garciaon, [facility]   [address]   [place]  [address] [phone]   FAX: [phone]  [facility]   [address]   [place]  [address] [phone]   FAX: [phone]         ENDOSCOPY WITH BANDING DISCHARGE INSTRUCTIONS    Eileen Pichardo    1940  Date: 7/29/2024    DISCOMFORT:  Use lozenges or warm salt water gargle for sore thoat  Apply warm compress to IV site if red.  If redness or soreness persists call the office.  You may experience gas and bloating.  Walking and belching will help relieve this.  You may experience chest pain or discomfort or feel as though food is \"sticking\" in your food pipe for a few days after the procedure. This is a normal feeling after banding of esophageal varices.    CHEST PRESSURE:  Banding of dilated veins (varices) in the food tube (esophagus) can be painful in some persons.  The pain is caused by squeezing the varices and lining of the esophagus by the bands used to seal the varices.  You can take liquid pain medication either acetaminophen or alternative.  The best treatment for this is to drink a an \"Icee\" or \"Slurpee\" since the ice crystals will freeze the nerve endings in the food tube and relieve the pain.    DIET:  Regular food may dislodge the bands placed on the varices.  For this reason you should only have liquid for the rest of today.  Eat only soft food that does not need to be chewed all day tomorrow.  You may advance to your regular diet in 2 days.    ACTIVITY:  Spend the remainder of the day

## 2024-08-05 ENCOUNTER — TELEPHONE (OUTPATIENT)
Age: 84
End: 2024-08-05

## 2024-08-05 ENCOUNTER — PREP FOR PROCEDURE (OUTPATIENT)
Age: 84
End: 2024-08-05

## 2024-08-05 PROBLEM — K74.60 CIRRHOSIS OF LIVER WITHOUT ASCITES (HCC): Status: ACTIVE | Noted: 2024-08-05

## 2024-08-05 NOTE — TELEPHONE ENCOUNTER
8/5/2024    Rosa Maria asking for call back to schedule EGD per MLS request for October. OK to keep follow up with Horace as is.    efs        ----- Message from Al Hunt MD sent at 7/29/2024  9:37 AM EDT -----  Regarding: Please schedule for repat EGD in 3 months

## 2024-09-11 RX ORDER — SPIRONOLACTONE 50 MG/1
100 TABLET, FILM COATED ORAL DAILY
Qty: 90 TABLET | Refills: 7 | Status: SHIPPED | OUTPATIENT
Start: 2024-09-11

## 2024-10-07 ENCOUNTER — HOSPITAL ENCOUNTER (OUTPATIENT)
Facility: HOSPITAL | Age: 84
Setting detail: OUTPATIENT SURGERY
Discharge: HOME | End: 2024-10-07
Attending: INTERNAL MEDICINE | Admitting: INTERNAL MEDICINE
Payer: MEDICARE

## 2024-10-07 ENCOUNTER — ANESTHESIA (OUTPATIENT)
Facility: HOSPITAL | Age: 84
End: 2024-10-07
Payer: MEDICARE

## 2024-10-07 ENCOUNTER — ANESTHESIA EVENT (OUTPATIENT)
Facility: HOSPITAL | Age: 84
End: 2024-10-07
Payer: MEDICARE

## 2024-10-07 VITALS
WEIGHT: 137.6 LBS | HEIGHT: 63 IN | RESPIRATION RATE: 18 BRPM | SYSTOLIC BLOOD PRESSURE: 123 MMHG | OXYGEN SATURATION: 97 % | TEMPERATURE: 97.5 F | BODY MASS INDEX: 24.38 KG/M2 | DIASTOLIC BLOOD PRESSURE: 59 MMHG | HEART RATE: 66 BPM

## 2024-10-07 LAB — GLUCOSE BLD STRIP.AUTO-MCNC: 162 MG/DL (ref 70–110)

## 2024-10-07 PROCEDURE — 82962 GLUCOSE BLOOD TEST: CPT

## 2024-10-07 PROCEDURE — 3600007502: Performed by: INTERNAL MEDICINE

## 2024-10-07 PROCEDURE — 6360000002 HC RX W HCPCS: Performed by: NURSE ANESTHETIST, CERTIFIED REGISTERED

## 2024-10-07 PROCEDURE — 2709999900 HC NON-CHARGEABLE SUPPLY: Performed by: INTERNAL MEDICINE

## 2024-10-07 PROCEDURE — 43244 EGD VARICES LIGATION: CPT | Performed by: INTERNAL MEDICINE

## 2024-10-07 PROCEDURE — 7100000010 HC PHASE II RECOVERY - FIRST 15 MIN: Performed by: INTERNAL MEDICINE

## 2024-10-07 PROCEDURE — 2720000010 HC SURG SUPPLY STERILE: Performed by: INTERNAL MEDICINE

## 2024-10-07 PROCEDURE — 7100000011 HC PHASE II RECOVERY - ADDTL 15 MIN: Performed by: INTERNAL MEDICINE

## 2024-10-07 PROCEDURE — 3700000000 HC ANESTHESIA ATTENDED CARE: Performed by: INTERNAL MEDICINE

## 2024-10-07 PROCEDURE — 3700000001 HC ADD 15 MINUTES (ANESTHESIA): Performed by: INTERNAL MEDICINE

## 2024-10-07 PROCEDURE — 3600007512: Performed by: INTERNAL MEDICINE

## 2024-10-07 RX ORDER — SODIUM CHLORIDE 0.9 % (FLUSH) 0.9 %
5-40 SYRINGE (ML) INJECTION EVERY 12 HOURS SCHEDULED
Status: CANCELLED | OUTPATIENT
Start: 2024-10-07

## 2024-10-07 RX ORDER — PROPOFOL 10 MG/ML
INJECTION, EMULSION INTRAVENOUS
Status: DISCONTINUED | OUTPATIENT
Start: 2024-10-07 | End: 2024-10-07 | Stop reason: SDUPTHER

## 2024-10-07 RX ORDER — FENTANYL CITRATE 50 UG/ML
25 INJECTION, SOLUTION INTRAMUSCULAR; INTRAVENOUS AS NEEDED
Status: DISCONTINUED | OUTPATIENT
Start: 2024-10-07 | End: 2024-10-07 | Stop reason: HOSPADM

## 2024-10-07 RX ORDER — SODIUM CHLORIDE 0.9 % (FLUSH) 0.9 %
5-40 SYRINGE (ML) INJECTION PRN
Status: CANCELLED | OUTPATIENT
Start: 2024-10-07

## 2024-10-07 RX ORDER — SODIUM CHLORIDE 9 MG/ML
INJECTION, SOLUTION INTRAVENOUS PRN
Status: DISCONTINUED | OUTPATIENT
Start: 2024-10-07 | End: 2024-10-07 | Stop reason: HOSPADM

## 2024-10-07 RX ORDER — ONDANSETRON 2 MG/ML
2 INJECTION INTRAMUSCULAR; INTRAVENOUS AS NEEDED
Status: DISCONTINUED | OUTPATIENT
Start: 2024-10-07 | End: 2024-10-07 | Stop reason: HOSPADM

## 2024-10-07 RX ADMIN — PROPOFOL 50 MG: 10 INJECTION, EMULSION INTRAVENOUS at 09:10

## 2024-10-07 RX ADMIN — PROPOFOL 50 MG: 10 INJECTION, EMULSION INTRAVENOUS at 09:20

## 2024-10-07 RX ADMIN — PROPOFOL 50 MG: 10 INJECTION, EMULSION INTRAVENOUS at 09:15

## 2024-10-07 ASSESSMENT — PAIN - FUNCTIONAL ASSESSMENT
PAIN_FUNCTIONAL_ASSESSMENT: NONE - DENIES PAIN
PAIN_FUNCTIONAL_ASSESSMENT: NONE - DENIES PAIN
PAIN_FUNCTIONAL_ASSESSMENT: 0-10

## 2024-10-07 ASSESSMENT — COPD QUESTIONNAIRES: CAT_SEVERITY: MODERATE

## 2024-10-07 NOTE — OP NOTE
[facility]      Al Hunt MD, FACP, FACG, FAASLD      Shannen Carrillo, MIKE Piedra, [facility]   Fidelina Reynoldsfredpayam, [facility]   Rowan Saeid, [facility]-  Chuy Camarena, [facility]   Maisha Gallagher, [facility]   Olamide Oneill, [facility]   [address]   [place]  [address] [phone]   FAX: [phone]  [facility]   [address]   [place]  [address] [phone]   FAX: [phone]          UPPER ENDOSCOPY WITH BANDING OF ESOPHAGEAL VARICES PROCEDURE NOTE    NAME: Eileen Pichardo  :  1940  MRN:  [id number]      INDICATION: Cirrhosis.  Screening for esophageal varices with variceal ligation if indicated.    : Al Hunt MD    SURGICAL ASSISTANT:  None    PROSTHETIC DEVISES, TISSUE GRAFTS, ORGAN TRANSPLANTS:  Not applicable     ANESTHESIA/SEDATION: Propofol was administered by anesthesia      PROCEDURE DESCRIPTION:  Informed consent was obtained from the patient for the procedure.  All risks and benefits of the procedure explained.     The procedure was performed in the endoscopy suite.  The patient was laying on a stretcher and moved to the left lateral decubitus position prior to administration of sedation.    Sedation was administered by anesthesiology.  See their note for details.      The endoscope was inserted into the mouth and advanced under direct vision to the second portion of the duodenum.  Careful inspection of upper gastrointestinal tract was made as the endoscope was inserted and withdrawn.  Retroflexion of the endoscope to view of the cardia of the stomach was performed.  After withdrawing the endoscope the banding devise was placed on the tip of the endoscope.  The scope was then reinserted under direct inspection and advanced

## 2024-10-07 NOTE — ANESTHESIA POSTPROCEDURE EVALUATION
Department of Anesthesiology  Postprocedure Note    Patient: Eileen Pichardo  MRN: [id number]  YOB: 1940  Date of evaluation: 10/7/2024    Procedure Summary       Date: 10/07/24 Room / Location: [place] [address] / [facility] ENDOSCOPY    Anesthesia Start: 0906 Anesthesia Stop: 0929    Procedure: ESOPHAGOGASTRODUODENOSCOPY; banding of varices x 3 bands (Upper GI Region) Diagnosis:       Cirrhosis of liver without ascites (HCC)      (Cirrhosis of liver without ascites (HCC) [K74.60])    Surgeons: Al Hunt MD Responsible Provider: Da Barksdale DO    Anesthesia Type: MAC ASA Status: 3            Anesthesia Type: No value filed.    Adriano Phase I: Adriano Score: 10    Adriano Phase II: Adriano Score: 10    Anesthesia Post Evaluation    Patient location during evaluation: PACU  Patient participation: complete - patient participated  Level of consciousness: awake and alert  Pain score: 0  Airway patency: patent  Nausea & Vomiting: no nausea and no vomiting  Cardiovascular status: blood pressure returned to baseline  Respiratory status: acceptable  Hydration status: euvolemic  Pain management: adequate    No notable events documented.

## 2024-10-07 NOTE — DISCHARGE INSTRUCTIONS
[facility]      Al Hunt MD, FACP, FACG, FAASLD      MIKE Luevano, [facility]   Fidelina Reynoldsfredpayam, [facility]   Rowan Saeid, [facility]  Chuy Camarena, [facility]   Maisha Gallagher, [facility]   Olamide Garciaon, [facility]   [address], [address]   [place]  [address] [phone]   FAX: [phone]  [facility]   [address], [address]   [place]  [address] [phone]   FAX: [phone]         ENDOSCOPY WITH BANDING DISCHARGE INSTRUCTIONS    Eileen Pichardo    1940  Date: 10/7/2024    DISCOMFORT:  Use lozenges or warm salt water gargle for sore thoat  Apply warm compress to IV site if red.  If redness or soreness persists call the office.  You may experience gas and bloating.  Walking and belching will help relieve this.  You may experience chest pain or discomfort or feel as though food is \"sticking\" in your food pipe for a few days after the procedure. This is a normal feeling after banding of esophageal varices.    CHEST PRESSURE:  Banding of dilated veins (varices) in the food tube (esophagus) can be painful in some persons.  The pain is caused by squeezing the varices and lining of the esophagus by the bands used to seal the varices.  You can take liquid pain medication either acetaminophen or alternative.  The best treatment for this is to drink a an \"Icee\" or \"Slurpee\" since the ice crystals will freeze the nerve endings in the food tube and relieve the pain.    DIET:  Regular food may dislodge the bands placed on the varices.  For this reason you should only have liquid for the rest of today.  Eat only soft food that does not need to be chewed all day tomorrow.  You may advance to your regular diet in 2 days.    ACTIVITY:  Spend the remainder of the day

## 2024-10-07 NOTE — ANESTHESIA PRE PROCEDURE
BMI:   Wt Readings from Last 3 Encounters:   07/29/24 59.6 kg (131 lb 4.8 oz)   07/23/24 60.1 kg (132 lb 9.6 oz)   04/29/24 59.5 kg (131 lb 3.2 oz)     There is no height or weight on file to calculate BMI.    CBC:   Lab Results   Component Value Date/Time    WBC 4.7 07/23/2024 01:44 PM    RBC 3.56 07/23/2024 01:44 PM    HGB 11.2 07/23/2024 01:44 PM    HCT 34.4 07/23/2024 01:44 PM    MCV 96.6 07/23/2024 01:44 PM    RDW 15.0 07/23/2024 01:44 PM     07/23/2024 01:44 PM       CMP:   Lab Results   Component Value Date/Time     07/23/2024 01:44 PM    K 3.8 07/23/2024 01:44 PM     07/23/2024 01:44 PM    CO2 32 07/23/2024 01:44 PM    BUN 21 07/23/2024 01:44 PM    CREATININE 0.96 07/23/2024 01:44 PM    GFRAA >60 07/12/2022 09:11 AM    AGRATIO 1.0 01/28/2021 02:10 PM    LABGLOM 59 07/23/2024 01:44 PM    LABGLOM 63 04/23/2024 10:03 AM    GLUCOSE 120 07/23/2024 01:44 PM    CALCIUM 8.6 07/23/2024 01:44 PM    BILITOT 0.9 07/23/2024 01:44 PM    ALKPHOS 94 07/23/2024 01:44 PM    ALKPHOS 131 07/29/2021 12:00 AM    AST 27 07/23/2024 01:44 PM    ALT 19 07/23/2024 01:44 PM       POC Tests: No results for input(s): \"POCGLU\", \"POCNA\", \"POCK\", \"POCCL\", \"POCBUN\", \"POCHEMO\", \"POCHCT\" in the last 72 hours.    Coags:   Lab Results   Component Value Date/Time    PROTIME 16.5 07/23/2024 01:44 PM    INR 1.3 07/23/2024 01:44 PM       HCG (If Applicable): No results found for: \"PREGTESTUR\", \"PREGSERUM\", \"HCG\", \"HCGQUANT\"     ABGs: No results found for: \"PHART\", \"PO2ART\", \"FOS9CGV\", \"EZQ5HWZ\", \"BEART\", \"T1KTEYLC\"     Type & Screen (If Applicable):  No results found for: \"ABORH\", \"LABANTI\"    Drug/Infectious Status (If Applicable):  Lab Results   Component Value Date/Time    HEPCAB <0.1 11/05/2013 12:11 PM       COVID-19 Screening (If Applicable): No results found for: \"COVID19\"        Anesthesia Evaluation  Patient summary reviewed and Nursing notes

## 2024-10-07 NOTE — H&P
patient.  These included reaction to anesthesia, pain, perforation and bleeding.    All questions were answered.    The patient wishes to proceed with the procedure.    ASA status  3    Airway assessment:   Mouth opening:  3   Mallampati:  3   Dentition:  Intact     ASSESSMENT AND PLAN:  EGD to assess for esophageal and/or gastric varices.  Sedation per anesthesiology      Al Hunt MD  [address]  [place]  [address] [phone]  [facility]

## 2024-10-15 ENCOUNTER — HOSPITAL ENCOUNTER (OUTPATIENT)
Facility: HOSPITAL | Age: 84
Discharge: HOME | End: 2024-10-18
Payer: MEDICARE

## 2024-10-15 DIAGNOSIS — K74.60 CIRRHOSIS OF LIVER WITHOUT ASCITES, UNSPECIFIED HEPATIC CIRRHOSIS TYPE (HCC): ICD-10-CM

## 2024-10-15 PROCEDURE — 76705 ECHO EXAM OF ABDOMEN: CPT

## 2024-10-23 ENCOUNTER — OFFICE VISIT (OUTPATIENT)
Age: 84
End: 2024-10-23
Payer: MEDICARE

## 2024-10-23 ENCOUNTER — HOSPITAL ENCOUNTER (OUTPATIENT)
Facility: HOSPITAL | Age: 84
Setting detail: SPECIMEN
Discharge: HOME | End: 2024-10-26
Payer: MEDICARE

## 2024-10-23 VITALS
HEIGHT: 63 IN | SYSTOLIC BLOOD PRESSURE: 135 MMHG | TEMPERATURE: 97.2 F | OXYGEN SATURATION: 99 % | BODY MASS INDEX: 23.6 KG/M2 | WEIGHT: 133.2 LBS | HEART RATE: 71 BPM | DIASTOLIC BLOOD PRESSURE: 61 MMHG

## 2024-10-23 DIAGNOSIS — R52 PAIN: ICD-10-CM

## 2024-10-23 DIAGNOSIS — K74.60 CIRRHOSIS OF LIVER WITHOUT ASCITES, UNSPECIFIED HEPATIC CIRRHOSIS TYPE (HCC): ICD-10-CM

## 2024-10-23 DIAGNOSIS — K74.60 CIRRHOSIS OF LIVER WITHOUT ASCITES, UNSPECIFIED HEPATIC CIRRHOSIS TYPE (HCC): Primary | ICD-10-CM

## 2024-10-23 LAB
ALBUMIN SERPL-MCNC: 2.6 G/DL (ref 3.4–5)
ALBUMIN/GLOB SERPL: 0.5 (ref 0.8–1.7)
ALP SERPL-CCNC: 105 U/L (ref 45–117)
ALT SERPL-CCNC: 19 U/L (ref 13–56)
AMMONIA PLAS-SCNC: 62 UMOL/L (ref 11–32)
ANION GAP SERPL CALC-SCNC: 6 MMOL/L (ref 3–18)
AST SERPL-CCNC: 22 U/L (ref 10–38)
BASOPHILS # BLD: 0 K/UL (ref 0–0.1)
BASOPHILS NFR BLD: 1 % (ref 0–2)
BILIRUB DIRECT SERPL-MCNC: 0.3 MG/DL (ref 0–0.2)
BILIRUB SERPL-MCNC: 0.7 MG/DL (ref 0.2–1)
BUN SERPL-MCNC: 17 MG/DL (ref 7–18)
BUN/CREAT SERPL: 17 (ref 12–20)
CALCIUM SERPL-MCNC: 9 MG/DL (ref 8.5–10.1)
CHLORIDE SERPL-SCNC: 99 MMOL/L (ref 100–111)
CO2 SERPL-SCNC: 31 MMOL/L (ref 21–32)
CREAT SERPL-MCNC: 1 MG/DL (ref 0.6–1.3)
DIFFERENTIAL METHOD BLD: ABNORMAL
EOSINOPHIL # BLD: 0.1 K/UL (ref 0–0.4)
EOSINOPHIL NFR BLD: 3 % (ref 0–5)
ERYTHROCYTE [DISTWIDTH] IN BLOOD BY AUTOMATED COUNT: 14.6 % (ref 11.6–14.5)
FERRITIN SERPL-MCNC: 29 NG/ML (ref 8–388)
GLOBULIN SER CALC-MCNC: 4.9 G/DL (ref 2–4)
GLUCOSE SERPL-MCNC: 319 MG/DL (ref 74–99)
HCT VFR BLD AUTO: 35.2 % (ref 35–45)
HGB BLD-MCNC: 11.4 G/DL (ref 12–16)
IMM GRANULOCYTES # BLD AUTO: 0 K/UL (ref 0–0.04)
IMM GRANULOCYTES NFR BLD AUTO: 0 % (ref 0–0.5)
INR PPP: 1.3 (ref 0.9–1.1)
IRON SATN MFR SERPL: 13 % (ref 20–50)
IRON SERPL-MCNC: 38 UG/DL (ref 50–175)
LYMPHOCYTES # BLD: 0.7 K/UL (ref 0.9–3.6)
LYMPHOCYTES NFR BLD: 16 % (ref 21–52)
MCH RBC QN AUTO: 30.3 PG (ref 24–34)
MCHC RBC AUTO-ENTMCNC: 32.4 G/DL (ref 31–37)
MCV RBC AUTO: 93.6 FL (ref 78–100)
MONOCYTES # BLD: 0.6 K/UL (ref 0.05–1.2)
MONOCYTES NFR BLD: 13 % (ref 3–10)
NEUTS SEG # BLD: 3 K/UL (ref 1.8–8)
NEUTS SEG NFR BLD: 68 % (ref 40–73)
NRBC # BLD: 0 K/UL (ref 0–0.01)
NRBC BLD-RTO: 0 PER 100 WBC
PLATELET # BLD AUTO: 94 K/UL (ref 135–420)
PMV BLD AUTO: 11.9 FL (ref 9.2–11.8)
POTASSIUM SERPL-SCNC: 3.7 MMOL/L (ref 3.5–5.5)
PROT SERPL-MCNC: 7.5 G/DL (ref 6.4–8.2)
PROTHROMBIN TIME: 16.4 SEC (ref 11.9–14.9)
RBC # BLD AUTO: 3.76 M/UL (ref 4.2–5.3)
SODIUM SERPL-SCNC: 136 MMOL/L (ref 136–145)
TIBC SERPL-MCNC: 295 UG/DL (ref 250–450)
WBC # BLD AUTO: 4.4 K/UL (ref 4.6–13.2)

## 2024-10-23 PROCEDURE — 85610 PROTHROMBIN TIME: CPT

## 2024-10-23 PROCEDURE — 1123F ACP DISCUSS/DSCN MKR DOCD: CPT | Performed by: NURSE PRACTITIONER

## 2024-10-23 PROCEDURE — 1159F MED LIST DOCD IN RCRD: CPT | Performed by: NURSE PRACTITIONER

## 2024-10-23 PROCEDURE — 83540 ASSAY OF IRON: CPT

## 2024-10-23 PROCEDURE — 83550 IRON BINDING TEST: CPT

## 2024-10-23 PROCEDURE — G8484 FLU IMMUNIZE NO ADMIN: HCPCS | Performed by: NURSE PRACTITIONER

## 2024-10-23 PROCEDURE — G8420 CALC BMI NORM PARAMETERS: HCPCS | Performed by: NURSE PRACTITIONER

## 2024-10-23 PROCEDURE — 1126F AMNT PAIN NOTED NONE PRSNT: CPT | Performed by: NURSE PRACTITIONER

## 2024-10-23 PROCEDURE — 82140 ASSAY OF AMMONIA: CPT

## 2024-10-23 PROCEDURE — 82107 ALPHA-FETOPROTEIN L3: CPT

## 2024-10-23 PROCEDURE — 80076 HEPATIC FUNCTION PANEL: CPT

## 2024-10-23 PROCEDURE — 1036F TOBACCO NON-USER: CPT | Performed by: NURSE PRACTITIONER

## 2024-10-23 PROCEDURE — 82728 ASSAY OF FERRITIN: CPT

## 2024-10-23 PROCEDURE — 3078F DIAST BP <80 MM HG: CPT | Performed by: NURSE PRACTITIONER

## 2024-10-23 PROCEDURE — 36415 COLL VENOUS BLD VENIPUNCTURE: CPT

## 2024-10-23 PROCEDURE — 3075F SYST BP GE 130 - 139MM HG: CPT | Performed by: NURSE PRACTITIONER

## 2024-10-23 PROCEDURE — 85025 COMPLETE CBC W/AUTO DIFF WBC: CPT

## 2024-10-23 PROCEDURE — 1160F RVW MEDS BY RX/DR IN RCRD: CPT | Performed by: NURSE PRACTITIONER

## 2024-10-23 PROCEDURE — 1090F PRES/ABSN URINE INCON ASSESS: CPT | Performed by: NURSE PRACTITIONER

## 2024-10-23 PROCEDURE — G8400 PT W/DXA NO RESULTS DOC: HCPCS | Performed by: NURSE PRACTITIONER

## 2024-10-23 PROCEDURE — G8427 DOCREV CUR MEDS BY ELIG CLIN: HCPCS | Performed by: NURSE PRACTITIONER

## 2024-10-23 PROCEDURE — 99214 OFFICE O/P EST MOD 30 MIN: CPT | Performed by: NURSE PRACTITIONER

## 2024-10-23 PROCEDURE — 80048 BASIC METABOLIC PNL TOTAL CA: CPT

## 2024-10-23 RX ORDER — SPIRONOLACTONE 50 MG/1
100 TABLET, FILM COATED ORAL DAILY
Qty: 180 TABLET | Refills: 3 | Status: SHIPPED | OUTPATIENT
Start: 2024-10-23

## 2024-10-23 RX ORDER — HYDROCODONE BITARTRATE AND ACETAMINOPHEN 5; 325 MG/1; MG/1
1 TABLET ORAL EVERY 4 HOURS PRN
Qty: 18 TABLET | Refills: 0 | Status: SHIPPED | OUTPATIENT
Start: 2024-10-23 | End: 2024-10-26

## 2024-10-23 RX ORDER — FUROSEMIDE 40 MG/1
40 TABLET ORAL DAILY
Qty: 90 TABLET | Refills: 3 | Status: SHIPPED | OUTPATIENT
Start: 2024-10-23

## 2024-10-23 RX ORDER — SUCRALFATE ORAL 1 G/10ML
1 SUSPENSION ORAL 4 TIMES DAILY
Qty: 3600 ML | Refills: 3 | Status: SHIPPED | OUTPATIENT
Start: 2024-10-23

## 2024-10-28 LAB
AFP L3 MFR SERPL: NORMAL % (ref 0–9.9)
AFP SERPL-MCNC: 1 NG/ML (ref 0–8.7)

## 2024-11-04 ENCOUNTER — HOSPITAL ENCOUNTER (OUTPATIENT)
Facility: HOSPITAL | Age: 84
Discharge: HOME | End: 2024-11-07
Payer: MEDICARE

## 2024-11-04 DIAGNOSIS — K74.60 CIRRHOSIS OF LIVER WITHOUT ASCITES, UNSPECIFIED HEPATIC CIRRHOSIS TYPE (HCC): ICD-10-CM

## 2024-11-04 PROCEDURE — A9579 GAD-BASE MR CONTRAST NOS,1ML: HCPCS | Performed by: NURSE PRACTITIONER

## 2024-11-04 PROCEDURE — 6360000004 HC RX CONTRAST MEDICATION: Performed by: NURSE PRACTITIONER

## 2024-11-04 PROCEDURE — 74183 MRI ABD W/O CNTR FLWD CNTR: CPT

## 2024-11-04 RX ADMIN — GADOTERIDOL 15 ML: 279.3 INJECTION, SOLUTION INTRAVENOUS at 16:59

## 2024-11-12 ENCOUNTER — TELEPHONE (OUTPATIENT)
Age: 84
End: 2024-11-12

## 2024-11-12 NOTE — TELEPHONE ENCOUNTER
----- Message from OLGA Lee - CNP sent at 11/11/2024  4:52 PM EST -----  Please let her know that her MRI does not suggest HCC.  Thank you.

## 2025-01-23 ENCOUNTER — PREP FOR PROCEDURE (OUTPATIENT)
Age: 85
End: 2025-01-23

## 2025-01-23 PROBLEM — K74.60 CIRRHOSIS OF LIVER (HCC): Status: ACTIVE | Noted: 2025-01-23

## 2025-01-27 DIAGNOSIS — K74.60 CIRRHOSIS OF LIVER WITHOUT ASCITES, UNSPECIFIED HEPATIC CIRRHOSIS TYPE (HCC): Primary | ICD-10-CM

## 2025-01-30 ENCOUNTER — TELEPHONE (OUTPATIENT)
Age: 85
End: 2025-01-30

## 2025-01-30 NOTE — TELEPHONE ENCOUNTER
Pt called and said that she was [facility] the day of her original EGD appt, and they took 4 liters off her abdomen and believe its another 3.5 liters still on her abdomen.     She want to know if she needs the fluid removed before she reschedules her endoscopy. SDF

## 2025-01-31 NOTE — PRE-PROCEDURE INSTRUCTIONS
Spoke to patient to schedule appointment.  Instructed to arrive at 11:30am for 12:30pm appointment.  Pt denies anticoagulation use.   Medications and allergies reviewed.  Opportunity for questions provided.

## 2025-02-03 ENCOUNTER — HOSPITAL ENCOUNTER (OUTPATIENT)
Facility: HOSPITAL | Age: 85
Discharge: HOME | End: 2025-02-03
Attending: INTERNAL MEDICINE | Admitting: INTERNAL MEDICINE
Payer: MEDICARE

## 2025-02-03 VITALS
OXYGEN SATURATION: 98 % | HEART RATE: 74 BPM | DIASTOLIC BLOOD PRESSURE: 46 MMHG | SYSTOLIC BLOOD PRESSURE: 103 MMHG | TEMPERATURE: 97.7 F | RESPIRATION RATE: 16 BRPM

## 2025-02-03 DIAGNOSIS — K74.60 CIRRHOSIS OF LIVER WITHOUT ASCITES, UNSPECIFIED HEPATIC CIRRHOSIS TYPE (HCC): ICD-10-CM

## 2025-02-03 LAB
APPEARANCE FLD: CLEAR
COLOR FLD: ABNORMAL
EOSINOPHIL NFR FLD MANUAL: 1 %
LYMPHOCYTES NFR FLD: 25 %
MACROPHAGES NFR FLD: 69 %
MONOCYTES NFR FLD: 0 %
NEUTROPHILS NFR FLD: 5 %
NEUTS BAND # FLD: 0 %
NUC CELL # FLD: 62 /CU MM
RBC # FLD: <2000 /CU MM
SPECIMEN SOURCE FLD: ABNORMAL

## 2025-02-03 PROCEDURE — 89051 BODY FLUID CELL COUNT: CPT

## 2025-02-03 PROCEDURE — 7100000010 HC PHASE II RECOVERY - FIRST 15 MIN

## 2025-02-03 PROCEDURE — 2709999900 US GUIDED PARACENTESIS

## 2025-02-03 PROCEDURE — P9047 ALBUMIN (HUMAN), 25%, 50ML: HCPCS | Performed by: NURSE PRACTITIONER

## 2025-02-03 PROCEDURE — 6360000002 HC RX W HCPCS: Performed by: RADIOLOGY

## 2025-02-03 PROCEDURE — 7100000011 HC PHASE II RECOVERY - ADDTL 15 MIN

## 2025-02-03 PROCEDURE — 6360000002 HC RX W HCPCS: Performed by: NURSE PRACTITIONER

## 2025-02-03 RX ORDER — ALBUMIN (HUMAN) 12.5 G/50ML
25 SOLUTION INTRAVENOUS ONCE
Status: COMPLETED | OUTPATIENT
Start: 2025-02-03 | End: 2025-02-03

## 2025-02-03 RX ORDER — ALBUMIN (HUMAN) 12.5 G/50ML
25 SOLUTION INTRAVENOUS
Status: DISCONTINUED | OUTPATIENT
Start: 2025-02-03 | End: 2025-02-03 | Stop reason: HOSPADM

## 2025-02-03 RX ADMIN — LIDOCAINE HYDROCHLORIDE ANHYDROUS 7 ML: 10 INJECTION, SOLUTION INFILTRATION at 12:38

## 2025-02-03 RX ADMIN — ALBUMIN (HUMAN) 25 G: 0.25 INJECTION, SOLUTION INTRAVENOUS at 12:00

## 2025-02-03 RX ADMIN — ALBUMIN (HUMAN) 25 G: 0.25 INJECTION, SOLUTION INTRAVENOUS at 13:29

## 2025-02-03 NOTE — PROGRESS NOTES
Prepped & ready.   1325 Back from procedure. Band-aid intact to right lower quad. Denies pain. Diet given.   1430 Discharged home via w/c in stable condition in care of daughter. Denies pain.

## 2025-02-03 NOTE — PROGRESS NOTES
Ultrasound guided RLQ paracentesis was performed.  Pt tolerated procedure well, no signs of distress.  14 mL of ascites fluid was collected and sent to lab for cell count and diff.  5,000 mL of fluid drained.  Pt returned to Care Unit in stable condition.

## 2025-02-12 ENCOUNTER — PREP FOR PROCEDURE (OUTPATIENT)
Age: 85
End: 2025-02-12

## 2025-02-12 ENCOUNTER — OFFICE VISIT (OUTPATIENT)
Age: 85
End: 2025-02-12
Payer: MEDICARE

## 2025-02-12 ENCOUNTER — HOSPITAL ENCOUNTER (OUTPATIENT)
Facility: HOSPITAL | Age: 85
Setting detail: SPECIMEN
Discharge: HOME | End: 2025-02-15
Payer: MEDICARE

## 2025-02-12 VITALS
TEMPERATURE: 97.2 F | HEART RATE: 68 BPM | BODY MASS INDEX: 24.45 KG/M2 | HEIGHT: 63 IN | DIASTOLIC BLOOD PRESSURE: 67 MMHG | OXYGEN SATURATION: 99 % | SYSTOLIC BLOOD PRESSURE: 106 MMHG | WEIGHT: 138 LBS

## 2025-02-12 DIAGNOSIS — K74.60 CIRRHOSIS OF LIVER WITHOUT ASCITES, UNSPECIFIED HEPATIC CIRRHOSIS TYPE (HCC): ICD-10-CM

## 2025-02-12 DIAGNOSIS — K74.60 CIRRHOSIS OF LIVER WITHOUT ASCITES, UNSPECIFIED HEPATIC CIRRHOSIS TYPE (HCC): Primary | ICD-10-CM

## 2025-02-12 LAB
ALBUMIN SERPL-MCNC: 3 G/DL (ref 3.4–5)
ALBUMIN/GLOB SERPL: 0.8 (ref 0.8–1.7)
ALP SERPL-CCNC: 113 U/L (ref 45–117)
ALT SERPL-CCNC: 25 U/L (ref 13–56)
ANION GAP SERPL CALC-SCNC: 4 MMOL/L (ref 3–18)
AST SERPL-CCNC: 34 U/L (ref 10–38)
BASOPHILS # BLD: 0.05 K/UL (ref 0–0.1)
BASOPHILS NFR BLD: 0.9 % (ref 0–2)
BILIRUB DIRECT SERPL-MCNC: 0.4 MG/DL (ref 0–0.2)
BILIRUB SERPL-MCNC: 1.3 MG/DL (ref 0.2–1)
BUN SERPL-MCNC: 18 MG/DL (ref 7–18)
BUN/CREAT SERPL: 21 (ref 12–20)
CALCIUM SERPL-MCNC: 8.3 MG/DL (ref 8.5–10.1)
CHLORIDE SERPL-SCNC: 98 MMOL/L (ref 100–111)
CO2 SERPL-SCNC: 29 MMOL/L (ref 21–32)
CREAT SERPL-MCNC: 0.86 MG/DL (ref 0.6–1.3)
DIFFERENTIAL METHOD BLD: ABNORMAL
EOSINOPHIL # BLD: 0.12 K/UL (ref 0–0.4)
EOSINOPHIL NFR BLD: 2.2 % (ref 0–5)
ERYTHROCYTE [DISTWIDTH] IN BLOOD BY AUTOMATED COUNT: ABNORMAL % (ref 11.6–14.5)
GLOBULIN SER CALC-MCNC: 4 G/DL (ref 2–4)
GLUCOSE SERPL-MCNC: 216 MG/DL (ref 74–99)
HCT VFR BLD AUTO: 37.6 % (ref 35–45)
HGB BLD-MCNC: 11.3 G/DL (ref 12–16)
IMM GRANULOCYTES # BLD AUTO: 0.04 K/UL (ref 0–0.04)
IMM GRANULOCYTES NFR BLD AUTO: 0.7 % (ref 0–0.5)
INR PPP: 1.3 (ref 0.9–1.1)
LYMPHOCYTES # BLD: 0.58 K/UL (ref 0.9–3.3)
LYMPHOCYTES NFR BLD: 10.5 % (ref 21–52)
MCH RBC QN AUTO: 28.9 PG (ref 24–34)
MCHC RBC AUTO-ENTMCNC: 30.1 G/DL (ref 31–37)
MCV RBC AUTO: 96.2 FL (ref 78–100)
MONOCYTES # BLD: 0.59 K/UL (ref 0.05–1.2)
MONOCYTES NFR BLD: 10.7 % (ref 3–10)
NEUTS SEG # BLD: 4.13 K/UL (ref 1.8–8)
NEUTS SEG NFR BLD: 75 % (ref 40–73)
NRBC # BLD: 0 K/UL (ref 0–0.01)
NRBC BLD-RTO: 0 PER 100 WBC
PLATELET # BLD AUTO: 112 K/UL (ref 135–420)
PLATELET COMMENT: ABNORMAL
PMV BLD AUTO: 11.6 FL (ref 9.2–11.8)
POTASSIUM SERPL-SCNC: 4.2 MMOL/L (ref 3.5–5.5)
PROT SERPL-MCNC: 7 G/DL (ref 6.4–8.2)
PROTHROMBIN TIME: 16.3 SEC (ref 11.9–14.9)
RBC # BLD AUTO: 3.91 M/UL (ref 4.2–5.3)
RBC MORPH BLD: ABNORMAL
SODIUM SERPL-SCNC: 131 MMOL/L (ref 136–145)
WBC # BLD AUTO: 5.5 K/UL (ref 4.6–13.2)

## 2025-02-12 PROCEDURE — 1160F RVW MEDS BY RX/DR IN RCRD: CPT | Performed by: NURSE PRACTITIONER

## 2025-02-12 PROCEDURE — 85025 COMPLETE CBC W/AUTO DIFF WBC: CPT

## 2025-02-12 PROCEDURE — 80076 HEPATIC FUNCTION PANEL: CPT

## 2025-02-12 PROCEDURE — 85610 PROTHROMBIN TIME: CPT

## 2025-02-12 PROCEDURE — 3074F SYST BP LT 130 MM HG: CPT | Performed by: NURSE PRACTITIONER

## 2025-02-12 PROCEDURE — G8427 DOCREV CUR MEDS BY ELIG CLIN: HCPCS | Performed by: NURSE PRACTITIONER

## 2025-02-12 PROCEDURE — 82107 ALPHA-FETOPROTEIN L3: CPT

## 2025-02-12 PROCEDURE — 1123F ACP DISCUSS/DSCN MKR DOCD: CPT | Performed by: NURSE PRACTITIONER

## 2025-02-12 PROCEDURE — G8420 CALC BMI NORM PARAMETERS: HCPCS | Performed by: NURSE PRACTITIONER

## 2025-02-12 PROCEDURE — 1090F PRES/ABSN URINE INCON ASSESS: CPT | Performed by: NURSE PRACTITIONER

## 2025-02-12 PROCEDURE — 3078F DIAST BP <80 MM HG: CPT | Performed by: NURSE PRACTITIONER

## 2025-02-12 PROCEDURE — 1036F TOBACCO NON-USER: CPT | Performed by: NURSE PRACTITIONER

## 2025-02-12 PROCEDURE — 1159F MED LIST DOCD IN RCRD: CPT | Performed by: NURSE PRACTITIONER

## 2025-02-12 PROCEDURE — 99214 OFFICE O/P EST MOD 30 MIN: CPT | Performed by: NURSE PRACTITIONER

## 2025-02-12 PROCEDURE — 36415 COLL VENOUS BLD VENIPUNCTURE: CPT

## 2025-02-12 PROCEDURE — G8400 PT W/DXA NO RESULTS DOC: HCPCS | Performed by: NURSE PRACTITIONER

## 2025-02-12 PROCEDURE — 80048 BASIC METABOLIC PNL TOTAL CA: CPT

## 2025-02-12 PROCEDURE — 1126F AMNT PAIN NOTED NONE PRSNT: CPT | Performed by: NURSE PRACTITIONER

## 2025-02-12 RX ORDER — CEFDINIR 300 MG/1
CAPSULE ORAL
COMMUNITY
Start: 2025-01-28 | End: 2025-02-12

## 2025-02-12 NOTE — PATIENT INSTRUCTIONS
Please increase the diuretics to: 60 mg lasix (1 and 1/2 tabs) daily.    Please increase Aldactone to 150 mg (3 tabs) daily.  Avoid sodium.   You have standing orders for paracentesis every 2 weeks if needed.

## 2025-02-12 NOTE — PROGRESS NOTES
[facility]     Al Hunt MD, FACP, MACG, FAASLD   MD Shannen Duggan PA-AJITH Piedra, [facility]   Fidelina Montiel, [facility]   Rowan Saeid, FNP-C  Chuy Camarena, FN-C   Maisha Gallagher, [facility]   at [facility]   [address], [address]   [place]  [address] [phone]   FAX: [phone]  [facility]   [address], [address]   [place]  [address] [phone]   FAX: [phone]       Patient Care Team:  Donell Lawson MD as PCP - General (Family Medicine)  Kathi Valdez MD (Urology)  Teo Garcia MD (Hematology and Oncology)  Nagi Mcclain MD (Pulmonary Disease)  Kaiden Sena PA (Dermatology Physician)  Ricardo Borjas MD (Cardiovascular Disease Physician)      Patient Active Problem List   Diagnosis    Gout    S/P cholecystectomy    GERD (gastroesophageal reflux disease)    Hypertension    Hypothyroidism    MANNING (nonalcoholic steatohepatitis)    UTI (urinary tract infection)    S/P OCTAVIA (total abdominal hysterectomy)    Female cystocele    Diabetes mellitus (HCC)    Cirrhosis of liver without ascites (HCC)    Cirrhosis of liver (HCC)       Eileen Pichardo was seen today at the [facility] for monitoring of MANNING/cirrhosis.  The active problem list, all pertinent past medical history, medications, liver histology, endoscopic studies, radiologic findings and laboratory findings related to the liver disorder were reviewed with the patient.      Serologic evaluation was positive for FER and ASMA.      A liver biopsy performed in 2005 suggested MANNING with fibrosis.      She had been on prednisone for macrobid induced pulmonary fibrosis.  While on prednisone the liver enzymes normalized.  When off prednisone

## 2025-02-13 ENCOUNTER — TELEPHONE (OUTPATIENT)
Age: 85
End: 2025-02-13

## 2025-02-17 ENCOUNTER — ANESTHESIA EVENT (OUTPATIENT)
Facility: HOSPITAL | Age: 85
End: 2025-02-17
Payer: MEDICARE

## 2025-02-17 ENCOUNTER — ANESTHESIA (OUTPATIENT)
Facility: HOSPITAL | Age: 85
End: 2025-02-17
Payer: MEDICARE

## 2025-02-17 ENCOUNTER — HOSPITAL ENCOUNTER (OUTPATIENT)
Facility: HOSPITAL | Age: 85
Setting detail: OUTPATIENT SURGERY
Discharge: HOME | End: 2025-02-17
Attending: INTERNAL MEDICINE | Admitting: INTERNAL MEDICINE
Payer: MEDICARE

## 2025-02-17 VITALS
SYSTOLIC BLOOD PRESSURE: 105 MMHG | OXYGEN SATURATION: 100 % | HEIGHT: 63 IN | WEIGHT: 135 LBS | RESPIRATION RATE: 15 BRPM | HEART RATE: 73 BPM | TEMPERATURE: 97.6 F | BODY MASS INDEX: 23.92 KG/M2 | DIASTOLIC BLOOD PRESSURE: 56 MMHG

## 2025-02-17 LAB — GLUCOSE BLD STRIP.AUTO-MCNC: 157 MG/DL (ref 70–110)

## 2025-02-17 PROCEDURE — 3600007512: Performed by: INTERNAL MEDICINE

## 2025-02-17 PROCEDURE — 3600007502: Performed by: INTERNAL MEDICINE

## 2025-02-17 PROCEDURE — 2580000003 HC RX 258: Performed by: INTERNAL MEDICINE

## 2025-02-17 PROCEDURE — 7100000011 HC PHASE II RECOVERY - ADDTL 15 MIN: Performed by: INTERNAL MEDICINE

## 2025-02-17 PROCEDURE — 2709999900 HC NON-CHARGEABLE SUPPLY: Performed by: INTERNAL MEDICINE

## 2025-02-17 PROCEDURE — 82962 GLUCOSE BLOOD TEST: CPT

## 2025-02-17 PROCEDURE — 3700000000 HC ANESTHESIA ATTENDED CARE: Performed by: INTERNAL MEDICINE

## 2025-02-17 PROCEDURE — 7100000010 HC PHASE II RECOVERY - FIRST 15 MIN: Performed by: INTERNAL MEDICINE

## 2025-02-17 PROCEDURE — 43235 EGD DIAGNOSTIC BRUSH WASH: CPT | Performed by: INTERNAL MEDICINE

## 2025-02-17 PROCEDURE — 6360000002 HC RX W HCPCS: Performed by: NURSE ANESTHETIST, CERTIFIED REGISTERED

## 2025-02-17 PROCEDURE — 3700000001 HC ADD 15 MINUTES (ANESTHESIA): Performed by: INTERNAL MEDICINE

## 2025-02-17 RX ORDER — ONDANSETRON 2 MG/ML
INJECTION INTRAMUSCULAR; INTRAVENOUS
Status: DISCONTINUED | OUTPATIENT
Start: 2025-02-17 | End: 2025-02-17 | Stop reason: SDUPTHER

## 2025-02-17 RX ORDER — PROPOFOL 10 MG/ML
INJECTION, EMULSION INTRAVENOUS
Status: DISCONTINUED | OUTPATIENT
Start: 2025-02-17 | End: 2025-02-17 | Stop reason: SDUPTHER

## 2025-02-17 RX ORDER — ONDANSETRON 2 MG/ML
2 INJECTION INTRAMUSCULAR; INTRAVENOUS AS NEEDED
Status: DISCONTINUED | OUTPATIENT
Start: 2025-02-17 | End: 2025-02-17 | Stop reason: HOSPADM

## 2025-02-17 RX ORDER — FENTANYL CITRATE 50 UG/ML
25 INJECTION, SOLUTION INTRAMUSCULAR; INTRAVENOUS AS NEEDED
Status: DISCONTINUED | OUTPATIENT
Start: 2025-02-17 | End: 2025-02-17 | Stop reason: HOSPADM

## 2025-02-17 RX ORDER — SODIUM CHLORIDE 0.9 % (FLUSH) 0.9 %
5-40 SYRINGE (ML) INJECTION PRN
Status: CANCELLED | OUTPATIENT
Start: 2025-02-17

## 2025-02-17 RX ORDER — PHENYLEPHRINE HCL IN 0.9% NACL 1 MG/10 ML
SYRINGE (ML) INTRAVENOUS
Status: DISCONTINUED | OUTPATIENT
Start: 2025-02-17 | End: 2025-02-17 | Stop reason: SDUPTHER

## 2025-02-17 RX ORDER — SODIUM CHLORIDE 0.9 % (FLUSH) 0.9 %
5-40 SYRINGE (ML) INJECTION EVERY 12 HOURS SCHEDULED
Status: CANCELLED | OUTPATIENT
Start: 2025-02-17

## 2025-02-17 RX ORDER — SODIUM CHLORIDE 9 MG/ML
INJECTION, SOLUTION INTRAVENOUS PRN
Status: DISCONTINUED | OUTPATIENT
Start: 2025-02-17 | End: 2025-02-17 | Stop reason: HOSPADM

## 2025-02-17 RX ORDER — LIDOCAINE HYDROCHLORIDE 20 MG/ML
INJECTION, SOLUTION INTRAVENOUS
Status: DISCONTINUED | OUTPATIENT
Start: 2025-02-17 | End: 2025-02-17 | Stop reason: SDUPTHER

## 2025-02-17 RX ADMIN — PROPOFOL 50 MG: 10 INJECTION, EMULSION INTRAVENOUS at 09:01

## 2025-02-17 RX ADMIN — ONDANSETRON 4 MG: 2 INJECTION INTRAMUSCULAR; INTRAVENOUS at 09:10

## 2025-02-17 RX ADMIN — LIDOCAINE HYDROCHLORIDE 50 MG: 20 INJECTION, SOLUTION INTRAVENOUS at 09:01

## 2025-02-17 RX ADMIN — PROPOFOL 50 MG: 10 INJECTION, EMULSION INTRAVENOUS at 09:04

## 2025-02-17 RX ADMIN — Medication 100 MCG: at 09:05

## 2025-02-17 RX ADMIN — SODIUM CHLORIDE: 9 INJECTION, SOLUTION INTRAVENOUS at 08:20

## 2025-02-17 ASSESSMENT — PAIN - FUNCTIONAL ASSESSMENT
PAIN_FUNCTIONAL_ASSESSMENT: 0-10

## 2025-02-17 ASSESSMENT — COPD QUESTIONNAIRES: CAT_SEVERITY: MODERATE

## 2025-02-17 ASSESSMENT — ENCOUNTER SYMPTOMS: DYSPNEA ACTIVITY LEVEL: AFTER AMBULATING 1 FLIGHT OF STAIRS

## 2025-02-17 NOTE — H&P
[facility]     Al Hunt MD, FACP, MACG, FAASLD   MD Shannen Duggan PA-C April S Ashworth, [facility]   Fidelina Montiel, [facility]   Rowan Breaux, FNP-C  Chuy Camarena, FNP-C   Maisha Gallagher, [facility]   at [facility]   [address], [address]   [place]  [address] [phone]   FAX: [phone]  [facility]   [address], [address]   [place]  [address] [phone]   FAX: [phone]       PRE-PROCEDURE NOTE - EGD    H and P from last office visit reviewed.    Allergies reviewed.  Out-patient medicaton list reviewed.    Patient Active Problem List   Diagnosis    Gout    S/P cholecystectomy    GERD (gastroesophageal reflux disease)    Hypertension    Hypothyroidism    MANNING (nonalcoholic steatohepatitis)    UTI (urinary tract infection)    S/P OCTAVIA (total abdominal hysterectomy)    Female cystocele    Diabetes mellitus (HCC)    Cirrhosis of liver without ascites (HCC)    Cirrhosis of liver (HCC)         Allergies   Allergen Reactions    Ciprofloxacin Itching, Hives and Rash     \"scalp burns and body itches\"    Nitrofurantoin Anaphylaxis and Other (See Comments)     Scarred lungs    Other reaction(s): pulmonary fibrosis    Lung damage    Other reaction(s): pulmonary fibrosis      Scarred lungs  Other reaction(s): pulmonary fibrosis  Lung damage    Sulfa Antibiotics Hives     Other reaction(s): pulmonary fibrosis    Tetanus Immune Globulin Swelling    Tetanus Toxoid Rash and Swelling       No current facility-administered medications on file prior to encounter.     Current Outpatient Medications on File Prior to Encounter   Medication Sig Dispense Refill    vitamin D (VITAMIN D3) 25 MCG (1000 UT) CAPS Take by mouth      sucralfate (CARAFATE) 1 GM/10ML suspension Take 10

## 2025-02-17 NOTE — PERIOP NOTE
Face to face  Discharged  instruction given to family and agreed with the plan. Discharged includes diet, activity limitations , medication to continue and f/u appointment. D/c to home in stable condition with care of family.  ENDO PICTURES WERE GIVEN per Doctor RYAN

## 2025-02-17 NOTE — OP NOTE
[facility]     Al Hunt MD, FACP, MACG, FAASLD   MD Shannen Duggan, MIKE Piedra, [facility]   Fidelina Montiel, [facility]   Rowan Saeid, FNP-C  Chuy Camarena, [facility]-C   Maisha Gallagher, [facility]   [address], [address]   [place]  [address] [phone]   FAX: [phone]  [facility]   [address]   [place]  [address] [phone]   FAX: [phone]       UPPER ENDOSCOPY PROCEDURE NOTE    NAME: Eileen Pichardo  :  1940  MRN:  [id number]    INDICATION: Cirrhosis.  Screening for esophageal varices with variceal ligation if  indicated.    : Al Hunt MD    SURGICAL ASSISTANT:  None    PROSTHETIC DEVISES, TISSUE GRAFTS, ORGAN TRANSPLANTS:  Not applicable     ANESTHESIA/SEDATION: MAC Sedation per anesthesiology      PROCEDURE DESCRIPTION:  [employer] consent was obtained from the patient for the procedure.  All risks and benefits of the procedure explained.     The procedure was performed in the endoscopy suite.  The patient was laying on a stretcher and moved to the left lateral decubitus position prior to administration of sedation.    Sedation was administered by anesthesiology.  See their note for details.      The endoscope was inserted into the mouth and advanced under direct vision to the second portion of the duodenum.  Careful inspection of upper gastrointestinal tract was made as the endoscope was inserted and withdrawn.  Retroflexion of the endoscope to view of the cardia of the stomach was performed.  The findings and interventions are described below.      FINDINGS:  Esophagus:    Small esophageal varices.  No banding performed.    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

## 2025-02-17 NOTE — DISCHARGE INSTRUCTIONS
DISCHARGE SUMMARY from Nurse    PATIENT INSTRUCTIONS:    After general anesthesia or intravenous sedation, for 24 hours or while taking prescription Narcotics:  Limit your activities  Do not drive and operate hazardous machinery  Do not make important personal or business decisions  Do  not drink alcoholic beverages  If you have not urinated within 8 hours after discharge, please contact your surgeon on call.    Report the following to your surgeon:  Excessive pain, swelling, redness or odor of or around the surgical area  Temperature over 100.5  Nausea and vomiting lasting longer than 4 hours or if unable to take medications  Any signs of decreased circulation or nerve impairment to extremity: change in color, persistent  numbness, tingling, coldness or increase pain  Any questions    What to do at Home:  Recommended activity: as above     If you experience any of the following symptoms as above, please follow up with Doctor RYAN.    *  Please give a list of your current medications to your Primary Care Provider.    *  Please update this list whenever your medications are discontinued, doses are      changed, or new medications (including over-the-counter products) are added.    *  Please carry medication information at all times in case of emergency situations.    These are general instructions for a healthy lifestyle:    No smoking/ No tobacco products/ Avoid exposure to second hand smoke  Surgeon General's Warning:  Quitting smoking now greatly reduces serious risk to your health.    Obesity, smoking, and sedentary lifestyle greatly increases your risk for illness    A healthy diet, regular physical exercise & weight monitoring are important for maintaining a healthy lifestyle    You may be retaining fluid if you have a history of heart failure or if you experience any of the following symptoms:  Weight gain of 3 pounds or more overnight or 5 pounds in a week, increased swelling in our hands or feet or

## 2025-02-17 NOTE — ANESTHESIA PRE PROCEDURE
Never   Substance Use Topics   • Alcohol use: Not Currently     Alcohol/week: 2.5 standard drinks of alcohol                                Counseling given: Not Answered      Vital Signs (Current):   Vitals:    02/17/25 0756   Pulse: 72   Resp: 16   Temp: 98.6 °F (37 °C)   TempSrc: Oral   SpO2: 100%   Weight: 61.2 kg (135 lb)   Height: 1.6 m (5' 3\")                                              BP Readings from Last 3 Encounters:   02/12/25 106/67   02/03/25 (!) 103/46   10/23/24 135/61       NPO Status: Time of last liquid consumption: 2030                        Time of last solid consumption: 2030                        Date of last liquid consumption: 02/16/25                        Date of last solid food consumption: 02/16/25    BMI:   Wt Readings from Last 3 Encounters:   02/17/25 61.2 kg (135 lb)   02/12/25 62.6 kg (138 lb)   11/04/24 60.3 kg (133 lb)     Body mass index is 23.91 kg/m².    CBC:   Lab Results   Component Value Date/Time    WBC 5.5 02/12/2025 03:24 PM    RBC 3.91 02/12/2025 03:24 PM    HGB 11.3 02/12/2025 03:24 PM    HCT 37.6 02/12/2025 03:24 PM    MCV 96.2 02/12/2025 03:24 PM    RDW ABNORMAL 02/12/2025 03:24 PM     02/12/2025 03:24 PM       CMP:   Lab Results   Component Value Date/Time     02/12/2025 03:24 PM    K 4.2 02/12/2025 03:24 PM    CL 98 02/12/2025 03:24 PM    CO2 29 02/12/2025 03:24 PM    BUN 18 02/12/2025 03:24 PM    CREATININE 0.86 02/12/2025 03:24 PM    GFRAA >60 07/12/2022 09:11 AM    AGRATIO 1.0 01/28/2021 02:10 PM    LABGLOM 67 02/12/2025 03:24 PM    LABGLOM 63 04/23/2024 10:03 AM    GLUCOSE 216 02/12/2025 03:24 PM    CALCIUM 8.3 02/12/2025 03:24 PM    BILITOT 1.3 02/12/2025 03:24 PM    ALKPHOS 113 02/12/2025 03:24 PM    ALKPHOS 131 07/29/2021 12:00 AM    AST 34 02/12/2025 03:24 PM    ALT 25 02/12/2025 03:24 PM       POC Tests: No results for input(s): \"POCGLU\", \"POCNA\", \"POCK\", \"POCCL\", \"POCBUN\", \"POCHEMO\", \"POCHCT\" in the last 72 hours.    Coags:   Lab Results

## 2025-02-17 NOTE — ANESTHESIA POSTPROCEDURE EVALUATION
Department of Anesthesiology  Postprocedure Note    Patient: Eileen Pichardo  MRN: [id number]  YOB: 1940  Date of evaluation: 2/17/2025    Procedure Summary       Date: 02/17/25 Room / Location: [facility] 01 / [facility] ENDOSCOPY    Anesthesia Start: 0854 Anesthesia Stop: 0913    Procedure: ESOPHAGOGASTRODUODENOSCOPY (Upper GI Region) Diagnosis:       Cirrhosis of liver without ascites (HCC)      (Cirrhosis of liver without ascites (HCC) [K74.60])    Surgeons: Al Hunt MD Responsible Provider: Da Barksdale DO    Anesthesia Type: MAC ASA Status: 3            Anesthesia Type: MAC    Adriano Phase I: Adrinao Score: 10    Adriano Phase II: Adriano Score: 10    Anesthesia Post Evaluation    Patient location during evaluation: PACU  Patient participation: complete - patient participated  Level of consciousness: awake and alert  Airway patency: patent  Nausea & Vomiting: no nausea and no vomiting  Cardiovascular status: blood pressure returned to baseline  Respiratory status: acceptable  Hydration status: euvolemic  Pain management: adequate    No notable events documented.

## 2025-02-19 LAB
AFP L3 MFR SERPL: NORMAL % (ref 0–9.9)
AFP SERPL-MCNC: 1.1 NG/ML (ref 0–8.7)

## 2025-03-19 ENCOUNTER — HOSPITAL ENCOUNTER (OUTPATIENT)
Facility: HOSPITAL | Age: 85
Discharge: HOME | End: 2025-03-19
Attending: INTERNAL MEDICINE | Admitting: INTERNAL MEDICINE
Payer: MEDICARE

## 2025-03-19 VITALS
OXYGEN SATURATION: 98 % | RESPIRATION RATE: 16 BRPM | TEMPERATURE: 98.2 F | HEART RATE: 85 BPM | DIASTOLIC BLOOD PRESSURE: 52 MMHG | HEIGHT: 63 IN | WEIGHT: 139.4 LBS | BODY MASS INDEX: 24.7 KG/M2 | SYSTOLIC BLOOD PRESSURE: 95 MMHG

## 2025-03-19 DIAGNOSIS — K74.60 CIRRHOSIS OF LIVER WITHOUT ASCITES, UNSPECIFIED HEPATIC CIRRHOSIS TYPE (HCC): ICD-10-CM

## 2025-03-19 LAB
APPEARANCE FLD: CLEAR
COLOR FLD: ABNORMAL
EOSINOPHIL NFR FLD MANUAL: 0 %
ERYTHROCYTE [DISTWIDTH] IN BLOOD BY AUTOMATED COUNT: 19.3 % (ref 11.6–14.5)
HCT VFR BLD AUTO: 30.7 % (ref 35–45)
HGB BLD-MCNC: 10.2 G/DL (ref 12–16)
INR PPP: 1.2 (ref 0.9–1.1)
LYMPHOCYTES NFR FLD: 11 %
MACROPHAGES NFR FLD: 76 %
MCH RBC QN AUTO: 33 PG (ref 24–34)
MCHC RBC AUTO-ENTMCNC: 33.2 G/DL (ref 31–37)
MCV RBC AUTO: 99.4 FL (ref 78–100)
MONOCYTES NFR FLD: 0 %
NEUTROPHILS NFR FLD: 13 %
NEUTS BAND # FLD: 0 %
NRBC # BLD: 0 K/UL (ref 0–0.01)
NRBC BLD-RTO: 0 PER 100 WBC
NUC CELL # FLD: 73 /CU MM
PLATELET # BLD AUTO: 105 K/UL (ref 135–420)
PMV BLD AUTO: 9.8 FL (ref 9.2–11.8)
PROTHROMBIN TIME: 15.6 SEC (ref 11.9–14.9)
RBC # BLD AUTO: 3.09 M/UL (ref 4.2–5.3)
RBC # FLD: <2000 /CU MM
SPECIMEN SOURCE FLD: ABNORMAL
WBC # BLD AUTO: 5.8 K/UL (ref 4.6–13.2)
WBC MORPH BLD: ABNORMAL

## 2025-03-19 PROCEDURE — 89051 BODY FLUID CELL COUNT: CPT

## 2025-03-19 PROCEDURE — 6360000002 HC RX W HCPCS: Performed by: INTERNAL MEDICINE

## 2025-03-19 PROCEDURE — 85027 COMPLETE CBC AUTOMATED: CPT

## 2025-03-19 PROCEDURE — 6360000002 HC RX W HCPCS: Performed by: NURSE PRACTITIONER

## 2025-03-19 PROCEDURE — 85610 PROTHROMBIN TIME: CPT

## 2025-03-19 PROCEDURE — P9047 ALBUMIN (HUMAN), 25%, 50ML: HCPCS | Performed by: NURSE PRACTITIONER

## 2025-03-19 PROCEDURE — 2709999900 US GUIDED PARACENTESIS

## 2025-03-19 RX ORDER — ALBUMIN (HUMAN) 12.5 G/50ML
25 SOLUTION INTRAVENOUS ONCE
Status: COMPLETED | OUTPATIENT
Start: 2025-03-19 | End: 2025-03-19

## 2025-03-19 RX ORDER — ALBUMIN (HUMAN) 12.5 G/50ML
25 SOLUTION INTRAVENOUS
Status: DISCONTINUED | OUTPATIENT
Start: 2025-03-19 | End: 2025-03-19 | Stop reason: HOSPADM

## 2025-03-19 RX ORDER — LIDOCAINE HYDROCHLORIDE 10 MG/ML
10 INJECTION, SOLUTION EPIDURAL; INFILTRATION; INTRACAUDAL; PERINEURAL ONCE
Status: COMPLETED | OUTPATIENT
Start: 2025-03-19 | End: 2025-03-19

## 2025-03-19 RX ORDER — LACTULOSE 10 G/15ML
10 SOLUTION ORAL 2 TIMES DAILY
COMMUNITY

## 2025-03-19 RX ADMIN — LIDOCAINE HYDROCHLORIDE ANHYDROUS 10 ML: 10 INJECTION, SOLUTION INFILTRATION at 14:23

## 2025-03-19 RX ADMIN — ALBUMIN (HUMAN) 25 G: 0.25 INJECTION, SOLUTION INTRAVENOUS at 13:02

## 2025-03-19 NOTE — PROCEDURES
PROCEDURE NOTE  Date: 3/19/2025   Name: Eileen Pichardo  YOB: 1940    Procedures Ultrasound Guided Right Lower Quad Paracentesis was preformed. 4.2 Liters was drained .  14ccs sent to lab for testing.

## 2025-03-19 NOTE — PROGRESS NOTES
1440 - returned from US> 4.2 liters obtained. Band aid dressing intact to RLQ of abdomen. No complications at site.  1455 - Discharge instructions declined. Denies c/o pain. Dressing D/I without signs of complications. Discharged to home in stable condition with daughter. Left ambulatory..

## 2025-04-07 ENCOUNTER — HOSPITAL ENCOUNTER (OUTPATIENT)
Facility: HOSPITAL | Age: 85
Discharge: HOME | End: 2025-04-07
Attending: INTERNAL MEDICINE | Admitting: INTERNAL MEDICINE
Payer: MEDICARE

## 2025-04-07 VITALS
WEIGHT: 139.6 LBS | BODY MASS INDEX: 24.73 KG/M2 | DIASTOLIC BLOOD PRESSURE: 62 MMHG | SYSTOLIC BLOOD PRESSURE: 111 MMHG | RESPIRATION RATE: 18 BRPM | HEART RATE: 73 BPM | TEMPERATURE: 97.9 F | OXYGEN SATURATION: 99 %

## 2025-04-07 DIAGNOSIS — K74.60 CIRRHOSIS OF LIVER WITHOUT ASCITES, UNSPECIFIED HEPATIC CIRRHOSIS TYPE (HCC): ICD-10-CM

## 2025-04-07 LAB
APPEARANCE FLD: CLEAR
COLOR FLD: ABNORMAL
EOSINOPHIL NFR FLD MANUAL: 1 %
LYMPHOCYTES NFR FLD: 32 %
MACROPHAGES NFR FLD: 62 %
MONOCYTES NFR FLD: 0 %
NEUTROPHILS NFR FLD: 5 %
NEUTS BAND # FLD: 0 %
NUC CELL # FLD: 72 /CU MM
RBC # FLD: <2000 /CU MM
SPECIMEN SOURCE FLD: ABNORMAL

## 2025-04-07 PROCEDURE — P9047 ALBUMIN (HUMAN), 25%, 50ML: HCPCS | Performed by: NURSE PRACTITIONER

## 2025-04-07 PROCEDURE — 6360000002 HC RX W HCPCS: Performed by: INTERNAL MEDICINE

## 2025-04-07 PROCEDURE — 6360000002 HC RX W HCPCS: Performed by: NURSE PRACTITIONER

## 2025-04-07 PROCEDURE — 2709999900 US GUIDED PARACENTESIS

## 2025-04-07 PROCEDURE — 89051 BODY FLUID CELL COUNT: CPT

## 2025-04-07 RX ORDER — ALBUMIN (HUMAN) 12.5 G/50ML
25 SOLUTION INTRAVENOUS AS NEEDED
Status: DISCONTINUED | OUTPATIENT
Start: 2025-04-07 | End: 2025-04-07 | Stop reason: HOSPADM

## 2025-04-07 RX ORDER — LIDOCAINE HYDROCHLORIDE 10 MG/ML
10 INJECTION, SOLUTION EPIDURAL; INFILTRATION; INTRACAUDAL; PERINEURAL ONCE
Status: COMPLETED | OUTPATIENT
Start: 2025-04-07 | End: 2025-04-07

## 2025-04-07 RX ORDER — ALBUMIN (HUMAN) 12.5 G/50ML
25 SOLUTION INTRAVENOUS ONCE
Status: COMPLETED | OUTPATIENT
Start: 2025-04-07 | End: 2025-04-07

## 2025-04-07 RX ADMIN — LIDOCAINE HYDROCHLORIDE ANHYDROUS 10 ML: 10 INJECTION, SOLUTION INFILTRATION at 11:49

## 2025-04-07 RX ADMIN — ALBUMIN (HUMAN) 25 G: 0.25 INJECTION, SOLUTION INTRAVENOUS at 10:22

## 2025-04-07 NOTE — DISCHARGE INSTRUCTIONS
PARACENTESIS DISCHARGE INSTRUCTIONS    Activity  No strenuous activity for 12 hours after your procedure.  No heavy lifting for 12 hours after your procedure.  No driving for 12 hours after your procedure.  Move slowly when changing your position to avoid becoming lightheaded or dizzy.  Continue a low salt (sodium) diet.    Care of Puncture Site  Remove the bandage the morning after your procedure.  You may shower the day after your procedure.  Fluid leaking from the puncture site is normal for a few days after your procedure.  You may change the bandage as needed for leaking fluid.  Do not keep bandage in place once the leaking has stopped.  Mild soreness at the puncture site is normal.  You may take medication for pain as prescribed by your doctor.        When to Call Your Doctor     Your doctor is _____Marilee______________________  New or increased pain at the puncture site.  Lightheadedness or dizziness when changing position that does not improve or it becomes severe.  If you have fainting please call 911 for emergency help.  Bleeding from the puncture site.  Signs of infection:     -Sudden and severe abdominal pain   -Fever              -Chills              -Redness and warmth around the puncture site              -Yellow or green drainage from the puncture site. It may or may not have a foul odor.

## 2025-04-07 NOTE — PROCEDURES
PROCEDURE NOTE  Date: 4/7/2025   Name: Eileen Pichardo  YOB: 1940    Procedures  Ultrasound Guided Right Lower Quad Paracentesis was preformed.  4.2 Liters was drained.  14ml sent to lab for testing.

## 2025-04-07 NOTE — PROGRESS NOTES
Pt returned from ultrasound,  4.5liters removed,  discharge inst given and reviewed with pt ,  arm bands removed and shredded,  pt discharged via wheelchair to car in care of son

## 2025-04-09 ENCOUNTER — HOSPITAL ENCOUNTER (OUTPATIENT)
Facility: HOSPITAL | Age: 85
Setting detail: SPECIMEN
Discharge: HOME | End: 2025-04-12
Payer: MEDICARE

## 2025-04-09 ENCOUNTER — OFFICE VISIT (OUTPATIENT)
Age: 85
End: 2025-04-09
Payer: MEDICARE

## 2025-04-09 VITALS
DIASTOLIC BLOOD PRESSURE: 68 MMHG | HEIGHT: 63 IN | HEART RATE: 70 BPM | WEIGHT: 130.4 LBS | OXYGEN SATURATION: 97 % | BODY MASS INDEX: 23.11 KG/M2 | TEMPERATURE: 97.5 F | SYSTOLIC BLOOD PRESSURE: 112 MMHG

## 2025-04-09 DIAGNOSIS — K74.60 CIRRHOSIS OF LIVER WITHOUT ASCITES, UNSPECIFIED HEPATIC CIRRHOSIS TYPE (HCC): Primary | ICD-10-CM

## 2025-04-09 DIAGNOSIS — K74.60 CIRRHOSIS OF LIVER WITHOUT ASCITES, UNSPECIFIED HEPATIC CIRRHOSIS TYPE (HCC): ICD-10-CM

## 2025-04-09 LAB
ALBUMIN SERPL-MCNC: 2.9 G/DL (ref 3.4–5)
ALBUMIN/GLOB SERPL: 0.8 (ref 0.8–1.7)
ALP SERPL-CCNC: 127 U/L (ref 45–117)
ALT SERPL-CCNC: 28 U/L (ref 13–56)
ANION GAP SERPL CALC-SCNC: 9 MMOL/L (ref 3–18)
AST SERPL-CCNC: 29 U/L (ref 10–38)
BASOPHILS # BLD: 0.02 K/UL (ref 0–0.1)
BASOPHILS NFR BLD: 0.3 % (ref 0–2)
BILIRUB DIRECT SERPL-MCNC: 0.4 MG/DL (ref 0–0.2)
BILIRUB SERPL-MCNC: 1.2 MG/DL (ref 0.2–1)
BUN SERPL-MCNC: 22 MG/DL (ref 7–18)
BUN/CREAT SERPL: 23 (ref 12–20)
CALCIUM SERPL-MCNC: 8.2 MG/DL (ref 8.5–10.1)
CHLORIDE SERPL-SCNC: 94 MMOL/L (ref 100–111)
CO2 SERPL-SCNC: 23 MMOL/L (ref 21–32)
CREAT SERPL-MCNC: 0.96 MG/DL (ref 0.6–1.3)
DIFFERENTIAL METHOD BLD: ABNORMAL
EOSINOPHIL # BLD: 0.14 K/UL (ref 0–0.4)
EOSINOPHIL NFR BLD: 1.8 % (ref 0–5)
ERYTHROCYTE [DISTWIDTH] IN BLOOD BY AUTOMATED COUNT: 16.2 % (ref 11.6–14.5)
GLOBULIN SER CALC-MCNC: 3.7 G/DL (ref 2–4)
GLUCOSE SERPL-MCNC: 190 MG/DL (ref 74–99)
HCT VFR BLD AUTO: 26.2 % (ref 35–45)
HGB BLD-MCNC: 8.4 G/DL (ref 12–16)
IMM GRANULOCYTES # BLD AUTO: 0.08 K/UL (ref 0–0.04)
IMM GRANULOCYTES NFR BLD AUTO: 1.1 % (ref 0–0.5)
INR PPP: 1.2 (ref 0.9–1.1)
LYMPHOCYTES # BLD: 0.67 K/UL (ref 0.9–3.3)
LYMPHOCYTES NFR BLD: 8.8 % (ref 21–52)
MCH RBC QN AUTO: 30.4 PG (ref 24–34)
MCHC RBC AUTO-ENTMCNC: 32.1 G/DL (ref 31–37)
MCV RBC AUTO: 94.9 FL (ref 78–100)
MONOCYTES # BLD: 0.88 K/UL (ref 0.05–1.2)
MONOCYTES NFR BLD: 11.6 % (ref 3–10)
NEUTS SEG # BLD: 5.79 K/UL (ref 1.8–8)
NEUTS SEG NFR BLD: 76.4 % (ref 40–73)
NRBC # BLD: 0 K/UL (ref 0–0.01)
NRBC BLD-RTO: 0 PER 100 WBC
PLATELET # BLD AUTO: 172 K/UL (ref 135–420)
PMV BLD AUTO: 10.4 FL (ref 9.2–11.8)
POTASSIUM SERPL-SCNC: 4.9 MMOL/L (ref 3.5–5.5)
PROT SERPL-MCNC: 6.6 G/DL (ref 6.4–8.2)
PROTHROMBIN TIME: 15.8 SEC (ref 11.9–14.9)
RBC # BLD AUTO: 2.76 M/UL (ref 4.2–5.3)
SODIUM SERPL-SCNC: 126 MMOL/L (ref 136–145)
WBC # BLD AUTO: 7.6 K/UL (ref 4.6–13.2)

## 2025-04-09 PROCEDURE — 80076 HEPATIC FUNCTION PANEL: CPT

## 2025-04-09 PROCEDURE — 1090F PRES/ABSN URINE INCON ASSESS: CPT | Performed by: NURSE PRACTITIONER

## 2025-04-09 PROCEDURE — 36415 COLL VENOUS BLD VENIPUNCTURE: CPT

## 2025-04-09 PROCEDURE — 99215 OFFICE O/P EST HI 40 MIN: CPT | Performed by: NURSE PRACTITIONER

## 2025-04-09 PROCEDURE — G8428 CUR MEDS NOT DOCUMENT: HCPCS | Performed by: NURSE PRACTITIONER

## 2025-04-09 PROCEDURE — G8400 PT W/DXA NO RESULTS DOC: HCPCS | Performed by: NURSE PRACTITIONER

## 2025-04-09 PROCEDURE — G8420 CALC BMI NORM PARAMETERS: HCPCS | Performed by: NURSE PRACTITIONER

## 2025-04-09 PROCEDURE — 1125F AMNT PAIN NOTED PAIN PRSNT: CPT | Performed by: NURSE PRACTITIONER

## 2025-04-09 PROCEDURE — 1036F TOBACCO NON-USER: CPT | Performed by: NURSE PRACTITIONER

## 2025-04-09 PROCEDURE — 85025 COMPLETE CBC W/AUTO DIFF WBC: CPT

## 2025-04-09 PROCEDURE — 82107 ALPHA-FETOPROTEIN L3: CPT

## 2025-04-09 PROCEDURE — 80048 BASIC METABOLIC PNL TOTAL CA: CPT

## 2025-04-09 PROCEDURE — 1123F ACP DISCUSS/DSCN MKR DOCD: CPT | Performed by: NURSE PRACTITIONER

## 2025-04-09 PROCEDURE — 85610 PROTHROMBIN TIME: CPT

## 2025-04-09 PROCEDURE — 3078F DIAST BP <80 MM HG: CPT | Performed by: NURSE PRACTITIONER

## 2025-04-09 PROCEDURE — 3074F SYST BP LT 130 MM HG: CPT | Performed by: NURSE PRACTITIONER

## 2025-04-09 RX ORDER — CEPHALEXIN 500 MG/1
1 CAPSULE ORAL
COMMUNITY
Start: 2025-02-23 | End: 2025-04-09 | Stop reason: ALTCHOICE

## 2025-04-09 RX ORDER — PANTOPRAZOLE SODIUM 40 MG/1
40 TABLET, DELAYED RELEASE ORAL DAILY
Qty: 90 TABLET | Refills: 1 | Status: SHIPPED | OUTPATIENT
Start: 2025-04-09

## 2025-04-11 ENCOUNTER — TELEPHONE (OUTPATIENT)
Age: 85
End: 2025-04-11

## 2025-04-11 DIAGNOSIS — K74.60 CIRRHOSIS OF LIVER WITHOUT ASCITES, UNSPECIFIED HEPATIC CIRRHOSIS TYPE (HCC): ICD-10-CM

## 2025-04-11 RX ORDER — LACTULOSE 10 G/15ML
10 SOLUTION ORAL 2 TIMES DAILY
Qty: 4730 ML | Refills: 5 | Status: SHIPPED | OUTPATIENT
Start: 2025-04-11

## 2025-04-11 NOTE — TELEPHONE ENCOUNTER
Pt left a message stating that the provider was supposed to send in a prescription for a replacement to their lactulose and their pharmacy stated that they have not received a script from them. SDF

## 2025-04-14 LAB
AFP L3 MFR SERPL: NORMAL % (ref 0–9.9)
AFP SERPL-MCNC: 0.8 NG/ML (ref 0–8.7)

## 2025-04-28 RX ORDER — ALBUMIN (HUMAN) 12.5 G/50ML
25 SOLUTION INTRAVENOUS ONCE
Status: CANCELLED | OUTPATIENT
Start: 2025-04-28 | End: 2025-04-28

## 2025-04-28 RX ORDER — ALBUMIN (HUMAN) 12.5 G/50ML
25 SOLUTION INTRAVENOUS AS NEEDED
Status: CANCELLED | OUTPATIENT
Start: 2025-04-28

## 2025-05-05 ENCOUNTER — HOSPITAL ENCOUNTER (OUTPATIENT)
Facility: HOSPITAL | Age: 85
Discharge: HOME | End: 2025-05-05
Attending: INTERNAL MEDICINE | Admitting: INTERNAL MEDICINE
Payer: MEDICARE

## 2025-05-05 VITALS
SYSTOLIC BLOOD PRESSURE: 117 MMHG | HEART RATE: 78 BPM | TEMPERATURE: 97.5 F | OXYGEN SATURATION: 100 % | RESPIRATION RATE: 20 BRPM | HEIGHT: 63 IN | DIASTOLIC BLOOD PRESSURE: 64 MMHG | WEIGHT: 138.8 LBS | BODY MASS INDEX: 24.59 KG/M2

## 2025-05-05 DIAGNOSIS — K74.60 CIRRHOSIS OF LIVER WITHOUT ASCITES, UNSPECIFIED HEPATIC CIRRHOSIS TYPE (HCC): ICD-10-CM

## 2025-05-05 LAB
APPEARANCE FLD: ABNORMAL
COLOR FLD: ABNORMAL
EOSINOPHIL NFR FLD MANUAL: 0 %
LYMPHOCYTES NFR FLD: 21 %
MACROPHAGES NFR FLD: 79 %
MONOCYTES NFR FLD: 0 %
NEUTROPHILS NFR FLD: 0 %
NEUTS BAND # FLD: 0 %
NUC CELL # FLD: 81 /CU MM
RBC # FLD: 0 /CU MM
SPECIMEN SOURCE FLD: ABNORMAL

## 2025-05-05 PROCEDURE — 6360000002 HC RX W HCPCS

## 2025-05-05 PROCEDURE — 89051 BODY FLUID CELL COUNT: CPT

## 2025-05-05 PROCEDURE — 7100000010 HC PHASE II RECOVERY - FIRST 15 MIN

## 2025-05-05 PROCEDURE — P9047 ALBUMIN (HUMAN), 25%, 50ML: HCPCS

## 2025-05-05 PROCEDURE — 7100000011 HC PHASE II RECOVERY - ADDTL 15 MIN

## 2025-05-05 PROCEDURE — 2709999900 US GUIDED PARACENTESIS

## 2025-05-05 RX ORDER — ALBUMIN (HUMAN) 12.5 G/50ML
25 SOLUTION INTRAVENOUS AS NEEDED
Status: DISCONTINUED | OUTPATIENT
Start: 2025-05-05 | End: 2025-05-05 | Stop reason: HOSPADM

## 2025-05-05 RX ORDER — ALBUMIN (HUMAN) 12.5 G/50ML
SOLUTION INTRAVENOUS
Status: COMPLETED
Start: 2025-05-05 | End: 2025-05-05

## 2025-05-05 RX ORDER — GINSENG 100 MG
50 CAPSULE ORAL DAILY
COMMUNITY

## 2025-05-05 RX ORDER — ALBUMIN (HUMAN) 12.5 G/50ML
25 SOLUTION INTRAVENOUS ONCE
Status: COMPLETED | OUTPATIENT
Start: 2025-05-05 | End: 2025-05-05

## 2025-05-05 RX ADMIN — ALBUMIN (HUMAN) 25 G: 0.25 INJECTION, SOLUTION INTRAVENOUS at 08:47

## 2025-05-05 RX ADMIN — ALBUMIN (HUMAN) 25 G: 12.5 SOLUTION INTRAVENOUS at 08:47

## 2025-05-05 RX ADMIN — LIDOCAINE HYDROCHLORIDE ANHYDROUS 10 ML: 10 INJECTION, SOLUTION INFILTRATION at 10:14

## 2025-05-05 NOTE — PROGRESS NOTES
Pt is all prepped and ready for procedure.    0915 Pt picked up for procedure.    1045 Pt back to the care unit , procedure tolerated well. Band-aid to right dry and intact.     1100 Pt escorted out via w/c and left with daughter in stable condition.

## 2025-05-29 ENCOUNTER — HOSPITAL ENCOUNTER (OUTPATIENT)
Facility: HOSPITAL | Age: 85
Discharge: HOME | End: 2025-05-29
Attending: INTERNAL MEDICINE | Admitting: INTERNAL MEDICINE
Payer: MEDICARE

## 2025-05-29 VITALS
DIASTOLIC BLOOD PRESSURE: 52 MMHG | BODY MASS INDEX: 24.62 KG/M2 | HEART RATE: 73 BPM | OXYGEN SATURATION: 100 % | WEIGHT: 139 LBS | SYSTOLIC BLOOD PRESSURE: 98 MMHG | TEMPERATURE: 98.2 F | RESPIRATION RATE: 16 BRPM

## 2025-05-29 DIAGNOSIS — K74.60 CIRRHOSIS OF LIVER WITHOUT ASCITES, UNSPECIFIED HEPATIC CIRRHOSIS TYPE (HCC): ICD-10-CM

## 2025-05-29 LAB
APPEARANCE FLD: ABNORMAL
COLOR FLD: YELLOW
EOSINOPHIL NFR FLD MANUAL: 0 %
ERYTHROCYTE [DISTWIDTH] IN BLOOD BY AUTOMATED COUNT: 25.7 % (ref 11.6–14.5)
HCT VFR BLD AUTO: 27.7 % (ref 35–45)
HGB BLD-MCNC: 8.7 G/DL (ref 12–16)
INR PPP: 1.2 (ref 0.9–1.1)
LYMPHOCYTES NFR FLD: 27 %
MACROPHAGES NFR FLD: 61 %
MCH RBC QN AUTO: 30.7 PG (ref 24–34)
MCHC RBC AUTO-ENTMCNC: 31.4 G/DL (ref 31–37)
MCV RBC AUTO: 97.9 FL (ref 78–100)
MONOCYTES NFR FLD: 0 %
NEUTROPHILS NFR FLD: 12 %
NEUTS BAND # FLD: 0 %
NRBC # BLD: 0 K/UL (ref 0–0.01)
NRBC BLD-RTO: 0 PER 100 WBC
NUC CELL # FLD: 113 /CU MM
PLATELET # BLD AUTO: 108 K/UL (ref 135–420)
PMV BLD AUTO: 12.4 FL (ref 9.2–11.8)
PROTHROMBIN TIME: 15.7 SEC (ref 11.9–14.9)
RBC # BLD AUTO: 2.83 M/UL (ref 4.2–5.3)
RBC # FLD: <2000 /CU MM
SPECIMEN SOURCE FLD: ABNORMAL
WBC # BLD AUTO: 5.1 K/UL (ref 4.6–13.2)

## 2025-05-29 PROCEDURE — 89051 BODY FLUID CELL COUNT: CPT

## 2025-05-29 PROCEDURE — 49083 ABD PARACENTESIS W/IMAGING: CPT

## 2025-05-29 PROCEDURE — P9047 ALBUMIN (HUMAN), 25%, 50ML: HCPCS | Performed by: NURSE PRACTITIONER

## 2025-05-29 PROCEDURE — 6360000002 HC RX W HCPCS: Performed by: NURSE PRACTITIONER

## 2025-05-29 PROCEDURE — 85027 COMPLETE CBC AUTOMATED: CPT

## 2025-05-29 PROCEDURE — 6360000002 HC RX W HCPCS: Performed by: INTERNAL MEDICINE

## 2025-05-29 PROCEDURE — 85610 PROTHROMBIN TIME: CPT

## 2025-05-29 RX ORDER — ALBUMIN (HUMAN) 12.5 G/50ML
25 SOLUTION INTRAVENOUS AS NEEDED
Status: DISCONTINUED | OUTPATIENT
Start: 2025-05-29 | End: 2025-05-29 | Stop reason: HOSPADM

## 2025-05-29 RX ORDER — ALBUMIN (HUMAN) 12.5 G/50ML
25 SOLUTION INTRAVENOUS ONCE
Status: COMPLETED | OUTPATIENT
Start: 2025-05-29 | End: 2025-05-29

## 2025-05-29 RX ORDER — LIDOCAINE HYDROCHLORIDE 10 MG/ML
10 INJECTION, SOLUTION EPIDURAL; INFILTRATION; INTRACAUDAL; PERINEURAL ONCE
Status: COMPLETED | OUTPATIENT
Start: 2025-05-29 | End: 2025-05-29

## 2025-05-29 RX ADMIN — LIDOCAINE HYDROCHLORIDE ANHYDROUS 10 ML: 10 INJECTION, SOLUTION INFILTRATION at 14:02

## 2025-05-29 RX ADMIN — ALBUMIN (HUMAN) 25 G: 0.25 INJECTION, SOLUTION INTRAVENOUS at 12:52

## 2025-05-29 NOTE — PROGRESS NOTES
Prepped & ready.   1430 Back from procedure. Band-aid intact to right side of abdomen. Denies pain. Drink given.   1440 Discharged home via w/c in stable condition in care of daughter. Denies pain.

## 2025-05-29 NOTE — PROCEDURES
PROCEDURE NOTE  Date: 5/29/2025   Name: Eileen Pichardo  YOB: 1940    Procedures Ultrasound Guided Right Lower Quad Paracentesis was preformed.  3.6 Liters was drained,  14ccs sent to lab for testing.

## 2025-08-05 ENCOUNTER — HOSPITAL ENCOUNTER (OUTPATIENT)
Facility: HOSPITAL | Age: 85
Discharge: HOME | End: 2025-08-05
Attending: RADIOLOGY | Admitting: RADIOLOGY
Payer: MEDICARE

## 2025-08-05 VITALS
HEART RATE: 78 BPM | DIASTOLIC BLOOD PRESSURE: 60 MMHG | RESPIRATION RATE: 20 BRPM | BODY MASS INDEX: 22.68 KG/M2 | SYSTOLIC BLOOD PRESSURE: 92 MMHG | OXYGEN SATURATION: 100 % | WEIGHT: 128 LBS | TEMPERATURE: 97 F | HEIGHT: 63 IN

## 2025-08-05 DIAGNOSIS — K74.60 CIRRHOSIS OF LIVER WITHOUT ASCITES, UNSPECIFIED HEPATIC CIRRHOSIS TYPE (HCC): ICD-10-CM

## 2025-08-05 LAB
APPEARANCE FLD: ABNORMAL
BASOPHILS # BLD: 0.02 K/UL (ref 0–0.1)
BASOPHILS NFR BLD: 0.3 % (ref 0–2)
COLOR FLD: ABNORMAL
DIFFERENTIAL METHOD BLD: ABNORMAL
EOSINOPHIL # BLD: 0.09 K/UL (ref 0–0.4)
EOSINOPHIL NFR BLD: 1.5 % (ref 0–5)
EOSINOPHIL NFR FLD MANUAL: 0 %
ERYTHROCYTE [DISTWIDTH] IN BLOOD BY AUTOMATED COUNT: 21.8 % (ref 11.6–14.5)
HCT VFR BLD AUTO: 29.3 % (ref 35–45)
HGB BLD-MCNC: 9.1 G/DL (ref 12–16)
IMM GRANULOCYTES # BLD AUTO: 0.03 K/UL (ref 0–0.04)
IMM GRANULOCYTES NFR BLD AUTO: 0.5 % (ref 0–0.5)
INR PPP: 1.3 (ref 0.9–1.2)
LYMPHOCYTES # BLD: 0.38 K/UL (ref 0.9–3.3)
LYMPHOCYTES NFR BLD: 6.5 % (ref 21–52)
LYMPHOCYTES NFR FLD: 49 %
MACROPHAGES NFR FLD: 40 %
MCH RBC QN AUTO: 30.3 PG (ref 24–34)
MCHC RBC AUTO-ENTMCNC: 31.1 G/DL (ref 31–37)
MCV RBC AUTO: 97.7 FL (ref 78–100)
MONOCYTES # BLD: 0.42 K/UL (ref 0.05–1.2)
MONOCYTES NFR BLD: 7.2 % (ref 3–10)
MONOCYTES NFR FLD: 0 %
NEUTROPHILS NFR FLD: 11 %
NEUTS BAND # FLD: 0 %
NEUTS SEG # BLD: 4.96 K/UL (ref 1.8–8)
NEUTS SEG NFR BLD: 84 % (ref 40–73)
NRBC # BLD: 0 K/UL (ref 0–0.01)
NRBC BLD-RTO: 0 PER 100 WBC
NUC CELL # FLD: 123 /CU MM
PLATELET # BLD AUTO: 100 K/UL (ref 135–420)
PLATELET COMMENT: ABNORMAL
PMV BLD AUTO: 12.5 FL (ref 9.2–11.8)
PROTHROMBIN TIME: 16.7 SEC (ref 12–15.1)
RBC # BLD AUTO: 3 M/UL (ref 4.2–5.3)
RBC # FLD: <2000 /CU MM
RBC MORPH BLD: ABNORMAL
RBC MORPH BLD: ABNORMAL
SPECIMEN SOURCE FLD: ABNORMAL
WBC # BLD AUTO: 5.9 K/UL (ref 4.6–13.2)

## 2025-08-05 PROCEDURE — 85025 COMPLETE CBC W/AUTO DIFF WBC: CPT

## 2025-08-05 PROCEDURE — 85610 PROTHROMBIN TIME: CPT

## 2025-08-05 PROCEDURE — 89051 BODY FLUID CELL COUNT: CPT

## 2025-08-05 PROCEDURE — 2709999900 US GUIDED PARACENTESIS

## 2025-08-05 PROCEDURE — P9047 ALBUMIN (HUMAN), 25%, 50ML: HCPCS | Performed by: NURSE PRACTITIONER

## 2025-08-05 PROCEDURE — 6360000002 HC RX W HCPCS: Performed by: NURSE PRACTITIONER

## 2025-08-05 PROCEDURE — 6360000002 HC RX W HCPCS: Performed by: INTERNAL MEDICINE

## 2025-08-05 RX ORDER — OMEPRAZOLE 20 MG/1
40 CAPSULE, DELAYED RELEASE ORAL NIGHTLY
COMMUNITY

## 2025-08-05 RX ORDER — ALBUMIN (HUMAN) 12.5 G/50ML
25 SOLUTION INTRAVENOUS ONCE
Status: COMPLETED | OUTPATIENT
Start: 2025-08-05 | End: 2025-08-05

## 2025-08-05 RX ORDER — ALBUMIN (HUMAN) 12.5 G/50ML
25 SOLUTION INTRAVENOUS PRN
Status: DISCONTINUED | OUTPATIENT
Start: 2025-08-05 | End: 2025-08-05 | Stop reason: HOSPADM

## 2025-08-05 RX ORDER — LIDOCAINE HYDROCHLORIDE 10 MG/ML
10 INJECTION, SOLUTION EPIDURAL; INFILTRATION; INTRACAUDAL; PERINEURAL ONCE
Status: COMPLETED | OUTPATIENT
Start: 2025-08-05 | End: 2025-08-05

## 2025-08-05 RX ADMIN — ALBUMIN (HUMAN) 25 G: 0.25 INJECTION, SOLUTION INTRAVENOUS at 11:45

## 2025-08-05 RX ADMIN — LIDOCAINE HYDROCHLORIDE ANHYDROUS 10 ML: 10 INJECTION, SOLUTION INFILTRATION at 12:32

## (undated) DEVICE — LIGATOR ENDOSCP DIA8.6-11.5MM MULT DISP SPDBND LIGATOR SUP

## (undated) DEVICE — ENDO CARRY-ON PROCEDURE KIT INCLUDES ENZYMATIC SPONGE, GAUZE, BIOHAZARD LABEL, TRAY, LUBRICANT, DIRTY SCOPE LABEL, WATER LABEL, TRAY, DRAWSTRING PAD, AND DEFENDO 4-PIECE KIT.: Brand: ENDO CARRY-ON PROCEDURE KIT

## (undated) DEVICE — TUBING SUCT L12FT DIA0.25IN CLR W/ MAXI-GRIP AND M/M CONN

## (undated) DEVICE — LIGATOR ENDOSCP L2.8MM DIA8.6-11.5MM MULT BND SPEEDBAND

## (undated) DEVICE — KENDALL RADIOLUCENT FOAM MONITORING ELECTRODE RECTANGULAR SHAPE: Brand: KENDALL

## (undated) DEVICE — MOUTHPIECE ENDOSCP L CTRL OPN AND SIDE PORTS DISP

## (undated) DEVICE — FORCEPS BX CAP 240CM L RAD JAW 4